# Patient Record
Sex: FEMALE | Race: WHITE | Employment: UNEMPLOYED | URBAN - METROPOLITAN AREA
[De-identification: names, ages, dates, MRNs, and addresses within clinical notes are randomized per-mention and may not be internally consistent; named-entity substitution may affect disease eponyms.]

---

## 2017-01-17 DIAGNOSIS — F43.10 PTSD (POST-TRAUMATIC STRESS DISORDER): ICD-10-CM

## 2017-01-17 NOTE — TELEPHONE ENCOUNTER
Last appt was 8-9-16 with Dr. Emory Boykin  Next appt is None scheduled. Ms. Moore Fitting patient has seen you 2 times for acute visits.

## 2017-02-02 ENCOUNTER — HOSPITAL ENCOUNTER (OUTPATIENT)
Dept: GENERAL RADIOLOGY | Age: 57
Discharge: HOME | End: 2017-02-02
Payer: MEDICARE

## 2017-02-02 ENCOUNTER — OFFICE VISIT (OUTPATIENT)
Dept: CARDIOLOGY CLINIC | Age: 57
End: 2017-02-02

## 2017-02-02 ENCOUNTER — HOSPITAL ENCOUNTER (OUTPATIENT)
Dept: LAB | Age: 57
Discharge: HOME | End: 2017-02-02
Payer: MEDICARE

## 2017-02-02 VITALS
HEIGHT: 72 IN | OXYGEN SATURATION: 97 % | BODY MASS INDEX: 39.68 KG/M2 | WEIGHT: 293 LBS | SYSTOLIC BLOOD PRESSURE: 120 MMHG | HEART RATE: 77 BPM | DIASTOLIC BLOOD PRESSURE: 60 MMHG

## 2017-02-02 DIAGNOSIS — E07.9 THYROID DISEASE: ICD-10-CM

## 2017-02-02 DIAGNOSIS — R05.9 COUGH: ICD-10-CM

## 2017-02-02 DIAGNOSIS — I71.21 ASCENDING AORTIC ANEURYSM: ICD-10-CM

## 2017-02-02 DIAGNOSIS — R01.1 HEART MURMUR: ICD-10-CM

## 2017-02-02 DIAGNOSIS — R00.0 TACHYCARDIA: ICD-10-CM

## 2017-02-02 DIAGNOSIS — E66.3 OVERWEIGHT: ICD-10-CM

## 2017-02-02 DIAGNOSIS — Z01.810 PREOP CARDIOVASCULAR EXAM: Primary | ICD-10-CM

## 2017-02-02 LAB
ANION GAP BLD CALC-SCNC: 10 MMOL/L (ref 3–18)
APTT PPP: 29.5 SEC (ref 23–36.4)
BUN SERPL-MCNC: 16 MG/DL (ref 7–18)
BUN/CREAT SERPL: 17 (ref 12–20)
CALCIUM SERPL-MCNC: 8.7 MG/DL (ref 8.5–10.1)
CHLORIDE SERPL-SCNC: 99 MMOL/L (ref 100–108)
CO2 SERPL-SCNC: 29 MMOL/L (ref 21–32)
CREAT SERPL-MCNC: 0.93 MG/DL (ref 0.6–1.3)
GLUCOSE SERPL-MCNC: 99 MG/DL (ref 74–99)
POTASSIUM SERPL-SCNC: 4.4 MMOL/L (ref 3.5–5.5)
SODIUM SERPL-SCNC: 138 MMOL/L (ref 136–145)

## 2017-02-02 PROCEDURE — 85730 THROMBOPLASTIN TIME PARTIAL: CPT | Performed by: OTOLARYNGOLOGY

## 2017-02-02 PROCEDURE — 80048 BASIC METABOLIC PNL TOTAL CA: CPT | Performed by: OTOLARYNGOLOGY

## 2017-02-02 PROCEDURE — 71020 XR CHEST PA LAT: CPT

## 2017-02-02 PROCEDURE — 36415 COLL VENOUS BLD VENIPUNCTURE: CPT | Performed by: OTOLARYNGOLOGY

## 2017-02-02 RX ORDER — LORAZEPAM 1 MG/1
1 TABLET ORAL
Qty: 30 TAB | Refills: 0 | OUTPATIENT
Start: 2017-02-02
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
## 2017-02-02 NOTE — PROGRESS NOTES
1. Have you been to the ER, urgent care clinic since your last visit? Hospitalized since your last visit? No     2. Have you seen or consulted any other health care providers outside of the [address] [place] [address] since your last visit? Include any pap smears or colon screening.   No

## 2017-02-02 NOTE — LETTER
2/2/2017 8:54 AM 
 
Ms. Ariel Layton [address] [facility] [address] formerly [facility] [phone] Adi Meza was seen in our office on 02/02/2017 for cardiac evaluation. From a cardiac standpoint she is intermediate risk for surgery with Dr. Davian Mccormack. Please feel free to contact our office if you have any questions regarding this patient. Sincerely, Tyra Fay MD

## 2017-02-10 ENCOUNTER — APPOINTMENT (OUTPATIENT)
Dept: GENERAL RADIOLOGY | Age: 57
End: 2017-02-10
Attending: EMERGENCY MEDICINE
Payer: MEDICARE

## 2017-02-10 ENCOUNTER — HOSPITAL ENCOUNTER (EMERGENCY)
Age: 57
Discharge: HOME | End: 2017-02-11
Attending: EMERGENCY MEDICINE
Payer: MEDICARE

## 2017-02-10 VITALS
DIASTOLIC BLOOD PRESSURE: 49 MMHG | OXYGEN SATURATION: 96 % | HEART RATE: 80 BPM | RESPIRATION RATE: 22 BRPM | HEIGHT: 72 IN | SYSTOLIC BLOOD PRESSURE: 140 MMHG | TEMPERATURE: 98.8 F | BODY MASS INDEX: 39.68 KG/M2 | WEIGHT: 293 LBS

## 2017-02-10 DIAGNOSIS — S60.012A CONTUSION OF LEFT THUMB WITHOUT DAMAGE TO NAIL, INITIAL ENCOUNTER: Primary | ICD-10-CM

## 2017-02-10 PROCEDURE — 99283 EMERGENCY DEPT VISIT LOW MDM: CPT

## 2017-02-10 PROCEDURE — 73140 X-RAY EXAM OF FINGER(S): CPT

## 2017-02-10 PROCEDURE — 77030008323 HC SPLNT FNGR GTR DJOR -A

## 2017-02-11 RX ORDER — HYDROCODONE BITARTRATE AND ACETAMINOPHEN 5; 325 MG/1; MG/1
TABLET ORAL
Qty: 12 TAB | Refills: 0 | Status: SHIPPED | OUTPATIENT
Start: 2017-02-11 | End: 2017-02-24

## 2017-02-11 NOTE — ED TRIAGE NOTES
Pt presents to ER c/o left thumb injury ~ 1530 today. Pt states she jammed her thumb and nail pulled back.

## 2017-02-11 NOTE — ED NOTES
Patient c/o left thumb pain after \"her finger nail bent back\". Patient understands plan of care. Will continue to monitor.
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
## 2017-02-13 ENCOUNTER — PATIENT OUTREACH (OUTPATIENT)
Dept: FAMILY MEDICINE CLINIC | Age: 57
End: 2017-02-13

## 2017-02-13 RX ORDER — HYDROCODONE BITARTRATE AND ACETAMINOPHEN 7.5; 325 MG/1; MG/1
1 TABLET ORAL
COMMUNITY
End: 2017-03-19

## 2017-02-13 NOTE — PROGRESS NOTES
Transitional Care: ED Visit        Emergency Department for  [facility] from 2/10/17, for  Thumb Pain. HPI: Reference by Karolee Cranker DO 2/10/17 2352:  Nell Rebollar is a 55 y.o. female with a history of PCOS, folliculitis, hypoglycemia, aortic aneurysm, anemia, HTN, hypercholesterolemia, and breast cancer who presents to the ED c/o left thumb pain, after jamming it into a wall at approximately 3:30 PM today. She states that her nail was removed, but she can still bend her thumb. She denies smoking. There are no other concerns at this time. Called patient on 2/13/2017. Verifying with 2 identifiers. Was at the movies when she hit her hand on the wall, bent her nail, swollen and tender today but improved from the ED. Wearing her splint. Answered her questions about ED note, declined VAL FU at this time since she is improving. Medication reconciliation: New medication(s): Norco    Plan: She will call and schedule her VAL appointment if her symptoms do not improve.

## 2017-02-23 ENCOUNTER — OFFICE VISIT (OUTPATIENT)
Dept: FAMILY MEDICINE CLINIC | Age: 57
End: 2017-02-23

## 2017-02-23 VITALS
OXYGEN SATURATION: 97 % | DIASTOLIC BLOOD PRESSURE: 91 MMHG | TEMPERATURE: 97.8 F | RESPIRATION RATE: 18 BRPM | HEART RATE: 77 BPM | SYSTOLIC BLOOD PRESSURE: 134 MMHG | HEIGHT: 72 IN

## 2017-02-23 DIAGNOSIS — F32.9 REACTIVE DEPRESSION: ICD-10-CM

## 2017-02-23 DIAGNOSIS — F43.10 PTSD (POST-TRAUMATIC STRESS DISORDER): ICD-10-CM

## 2017-02-23 DIAGNOSIS — J40 BRONCHITIS: Primary | ICD-10-CM

## 2017-02-23 DIAGNOSIS — F33.1 MODERATE EPISODE OF RECURRENT MAJOR DEPRESSIVE DISORDER (HCC): ICD-10-CM

## 2017-02-23 DIAGNOSIS — F51.01 PRIMARY INSOMNIA: ICD-10-CM

## 2017-02-23 RX ORDER — LORAZEPAM 1 MG/1
1 TABLET ORAL
Qty: 30 TAB | Refills: 0 | Status: SHIPPED | OUTPATIENT
Start: 2017-02-23

## 2017-02-23 RX ORDER — AZITHROMYCIN 250 MG/1
TABLET, FILM COATED ORAL
Qty: 6 TAB | Refills: 0 | Status: SHIPPED | OUTPATIENT
Start: 2017-02-23 | End: 2017-02-28

## 2017-02-23 RX ORDER — IBUPROFEN 800 MG/1
800 TABLET ORAL
Qty: 30 TAB | Refills: 0 | Status: SHIPPED | OUTPATIENT
Start: 2017-02-23 | End: 2017-03-19

## 2017-02-23 RX ORDER — TRAZODONE HYDROCHLORIDE 150 MG/1
150 TABLET ORAL 3 TIMES DAILY
Qty: 90 TAB | Refills: 0 | Status: SHIPPED | OUTPATIENT
Start: 2017-02-23 | End: 2017-11-22 | Stop reason: SDUPTHER

## 2017-02-23 RX ORDER — CODEINE PHOSPHATE AND GUAIFENESIN 10; 100 MG/5ML; MG/5ML
10 SOLUTION ORAL
Qty: 120 ML | Refills: 0 | Status: SHIPPED | OUTPATIENT
Start: 2017-02-23 | End: 2017-08-30 | Stop reason: ALTCHOICE

## 2017-02-23 NOTE — PATIENT INSTRUCTIONS
Bronchitis: Care Instructions  Your Care Instructions    Bronchitis is inflammation of the bronchial tubes, which carry air to the lungs. The tubes swell and produce mucus, or phlegm. The mucus and inflamed bronchial tubes make you cough. You may have trouble breathing. Most cases of bronchitis are caused by viruses like those that cause colds. Antibiotics usually do not help and they may be harmful. Bronchitis usually develops rapidly and lasts about 2 to 3 weeks in otherwise healthy people. Follow-up care is a key part of your treatment and safety. Be sure to make and go to all appointments, and call your doctor if you are having problems. It's also a good idea to know your test results and keep a list of the medicines you take. How can you care for yourself at home? · Take all medicines exactly as prescribed. Call your doctor if you think you are having a problem with your medicine. · Get some extra rest.  · Take an over-the-counter pain medicine, such as acetaminophen (Tylenol), ibuprofen (Advil, Motrin), or naproxen (Aleve) to reduce fever and relieve body aches. Read and follow all instructions on the label. · Do not take two or more pain medicines at the same time unless the doctor told you to. Many pain medicines have acetaminophen, which is Tylenol. Too much acetaminophen (Tylenol) can be harmful. · Take an over-the-counter cough medicine that contains dextromethorphan to help quiet a dry, hacking cough so that you can sleep. Avoid cough medicines that have more than one active ingredient. Read and follow all instructions on the label. · Breathe moist air from a humidifier, hot shower, or sink filled with hot water. The heat and moisture will thin mucus so you can cough it out. · Do not smoke. Smoking can make bronchitis worse. If you need help quitting, talk to your doctor about stop-smoking programs and medicines. These can increase your chances of quitting for good.   When should you call for help? Call 911 anytime you think you may need emergency care. For example, call if:  · You have severe trouble breathing. Call your doctor now or seek immediate medical care if:  · You have new or worse trouble breathing. · You cough up dark brown or bloody mucus (sputum). · You have a new or higher fever. · You have a new rash. Watch closely for changes in your health, and be sure to contact your doctor if:  · You cough more deeply or more often, especially if you notice more mucus or a change in the color of your mucus. · You are not getting better as expected. Where can you learn more? Go to http://flora-sarkis.info/. Enter H333 in the search box to learn more about \"Bronchitis: Care Instructions. \"  Current as of: May 23, 2016  Content Version: 11.1  © 0162-3644 [employer]. Care instructions adapted under license by [employer] (which disclaims liability or warranty for this information). If you have questions about a medical condition or this instruction, always ask your healthcare professional. [place] [address] any warranty or liability for your use of this information. Insomnia: Care Instructions  Your Care Instructions  Insomnia is the inability to sleep well. It is a common problem for most people at some time. Insomnia may make it hard for you to get to sleep, stay asleep, or sleep as long as you need to. This can make you tired and grouchy during the day. It can also make you forgetful, less effective at work, and unhappy. Insomnia can be caused by conditions such as depression or anxiety. Pain can also affect your ability to sleep. When these problems are solved, the insomnia usually clears up. But sometimes bad sleep habits can cause insomnia. If insomnia is affecting your work or your enjoyment of life, you can take steps to improve your sleep. Follow-up care is a key part of your treatment and safety.  Be sure to make and go to all appointments, and call your doctor if you are having problems. It's also a good idea to know your test results and keep a list of the medicines you take. How can you care for yourself at home? What to avoid  · Do not have drinks with caffeine, such as coffee or black tea, for 8 hours before bed. · Do not smoke or use other types of tobacco near bedtime. Nicotine is a stimulant and can keep you awake. · Avoid drinking alcohol late in the evening, because it can cause you to wake in the middle of the night. · Do not eat a big meal close to bedtime. If you are hungry, eat a light snack. · Do not drink a lot of water close to bedtime, because the need to urinate may wake you up during the night. · Do not read or watch TV in bed. Use the bed only for sleeping and sexual activity. What to try  · Go to bed at the same time every night, and wake up at the same time every morning. Do not take naps during the day. · Keep your bedroom quiet, dark, and cool. · Sleep on a comfortable pillow and mattress. · If watching the clock makes you anxious, turn it facing away from you so you cannot see the time. · If you worry when you lie down, start a worry book. Well before bedtime, write down your worries, and then set the book and your concerns aside. · Try meditation or other relaxation techniques before you go to bed. · If you cannot fall asleep, get up and go to another room until you feel sleepy. Do something relaxing. Repeat your bedtime routine before you go to bed again. · Make your house quiet and calm about an hour before bedtime. Turn down the lights, turn off the TV, log off the computer, and turn down the volume on music. This can help you relax after a busy day. When should you call for help? Watch closely for changes in your health, and be sure to contact your doctor if:  · Your efforts to improve your sleep do not work. · Your insomnia gets worse.   · You have been feeling down, depressed, or hopeless or have lost interest in things that you usually enjoy. Where can you learn more? Go to http://flora-sarkis.info/. Enter P513 in the search box to learn more about \"Insomnia: Care Instructions. \"  Current as of: July 26, 2016  Content Version: 11.1  © 2377-4142 [employer]. Care instructions adapted under license by [employer] (which disclaims liability or warranty for this information). If you have questions about a medical condition or this instruction, always ask your healthcare professional. [address] any warranty or liability for your use of this information.
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
## 2017-02-23 NOTE — PROGRESS NOTES
1. Have you been to the ER, urgent care clinic since your last visit? Hospitalized since your last visit? Yes went to [facility] on 2/10/2017 for left thumb trauma. 2. Have you seen or consulted any other health care providers outside of the [facility] since your last visit? Include any pap smears or colon screening. No.    Patient presents with cough, nasal congestion, headache, wheezing.

## 2017-02-24 ENCOUNTER — TELEPHONE (OUTPATIENT)
Dept: PRIMARY CARE CLINIC | Age: 57
End: 2017-02-24

## 2017-02-24 ENCOUNTER — TELEPHONE (OUTPATIENT)
Dept: FAMILY MEDICINE CLINIC | Age: 57
End: 2017-02-24

## 2017-02-24 RX ORDER — ALBUTEROL SULFATE 90 UG/1
2 AEROSOL, METERED RESPIRATORY (INHALATION)
Qty: 1 INHALER | Refills: 0 | Status: SHIPPED | OUTPATIENT
Start: 2017-02-24 | End: 2017-08-30 | Stop reason: ALTCHOICE

## 2017-02-24 NOTE — TELEPHONE ENCOUNTER
Pt. Wants to know about the inhaler you were suppose to send to [facility] with her other medications. All was there except inhaler. Please call .

## 2017-03-03 ENCOUNTER — HOSPITAL ENCOUNTER (OUTPATIENT)
Dept: PREADMISSION TESTING | Age: 57
Discharge: HOME | End: 2017-03-03
Payer: MEDICARE

## 2017-03-03 ENCOUNTER — ANESTHESIA EVENT (OUTPATIENT)
Dept: SURGERY | Age: 57
End: 2017-03-03
Payer: MEDICARE

## 2017-03-03 LAB
HCT VFR BLD AUTO: 42.4 % (ref 35–45)
HGB BLD-MCNC: 13.8 G/DL (ref 12–16)

## 2017-03-03 PROCEDURE — 85018 HEMOGLOBIN: CPT | Performed by: OTOLARYNGOLOGY

## 2017-03-03 PROCEDURE — 36415 COLL VENOUS BLD VENIPUNCTURE: CPT | Performed by: OTOLARYNGOLOGY

## 2017-03-03 NOTE — PROGRESS NOTES
Patient seen today for airway check. AWC 2 -  right thyroid mass with slight deviation of the trachea. Awake fiberoptic discussed with the patient.

## 2017-03-17 ENCOUNTER — ANESTHESIA (OUTPATIENT)
Dept: SURGERY | Age: 57
End: 2017-03-17
Payer: MEDICARE

## 2017-03-17 ENCOUNTER — HOSPITAL ENCOUNTER (OUTPATIENT)
Age: 57
Setting detail: OBSERVATION
Discharge: HOME | End: 2017-03-19
Attending: OTOLARYNGOLOGY | Admitting: OTOLARYNGOLOGY
Payer: MEDICARE

## 2017-03-17 LAB
CALCIUM SERPL-MCNC: 8.2 MG/DL (ref 8.5–10.1)
GLUCOSE BLD STRIP.AUTO-MCNC: 103 MG/DL (ref 70–110)
GLUCOSE BLD STRIP.AUTO-MCNC: 104 MG/DL (ref 70–110)
HCG SERPL QL: NEGATIVE

## 2017-03-17 PROCEDURE — 74011250636 HC RX REV CODE- 250/636: Performed by: ANESTHESIOLOGY

## 2017-03-17 PROCEDURE — 82310 ASSAY OF CALCIUM: CPT | Performed by: OTOLARYNGOLOGY

## 2017-03-17 PROCEDURE — 77030002986 HC SUT PROL J&J -A: Performed by: OTOLARYNGOLOGY

## 2017-03-17 PROCEDURE — 36415 COLL VENOUS BLD VENIPUNCTURE: CPT | Performed by: OTOLARYNGOLOGY

## 2017-03-17 PROCEDURE — 84703 CHORIONIC GONADOTROPIN ASSAY: CPT | Performed by: OTOLARYNGOLOGY

## 2017-03-17 PROCEDURE — 76060000038 HC ANESTHESIA 3.5 TO 4 HR: Performed by: OTOLARYNGOLOGY

## 2017-03-17 PROCEDURE — 77030008683 HC TU ET CUF COVD -A: Performed by: ANESTHESIOLOGY

## 2017-03-17 PROCEDURE — 74011250636 HC RX REV CODE- 250/636

## 2017-03-17 PROCEDURE — 77030008477 HC STYL SATN SLP COVD -A: Performed by: ANESTHESIOLOGY

## 2017-03-17 PROCEDURE — 74011250637 HC RX REV CODE- 250/637: Performed by: OTOLARYNGOLOGY

## 2017-03-17 PROCEDURE — 77030002916 HC SUT ETHLN J&J -A: Performed by: OTOLARYNGOLOGY

## 2017-03-17 PROCEDURE — 77030010512 HC APPL CLP LIG J&J -C: Performed by: OTOLARYNGOLOGY

## 2017-03-17 PROCEDURE — 74011250636 HC RX REV CODE- 250/636: Performed by: OTOLARYNGOLOGY

## 2017-03-17 PROCEDURE — 74011000250 HC RX REV CODE- 250: Performed by: ANESTHESIOLOGY

## 2017-03-17 PROCEDURE — 88307 TISSUE EXAM BY PATHOLOGIST: CPT | Performed by: OTOLARYNGOLOGY

## 2017-03-17 PROCEDURE — 76210000000 HC OR PH I REC 2 TO 2.5 HR: Performed by: OTOLARYNGOLOGY

## 2017-03-17 PROCEDURE — 77030032490 HC SLV COMPR SCD KNE COVD -B: Performed by: OTOLARYNGOLOGY

## 2017-03-17 PROCEDURE — 77030020782 HC GWN BAIR PAWS FLX 3M -B: Performed by: OTOLARYNGOLOGY

## 2017-03-17 PROCEDURE — 74011258636 HC RX REV CODE- 258/636: Performed by: OTOLARYNGOLOGY

## 2017-03-17 PROCEDURE — 77030011640 HC PAD GRND REM COVD -A: Performed by: OTOLARYNGOLOGY

## 2017-03-17 PROCEDURE — 76010000134 HC OR TIME 3.5 TO 4 HR: Performed by: OTOLARYNGOLOGY

## 2017-03-17 PROCEDURE — 77010033678 HC OXYGEN DAILY

## 2017-03-17 PROCEDURE — 77030002996 HC SUT SLK J&J -A: Performed by: OTOLARYNGOLOGY

## 2017-03-17 PROCEDURE — 77030018836 HC SOL IRR NACL ICUM -A: Performed by: OTOLARYNGOLOGY

## 2017-03-17 PROCEDURE — 77030034115 HC SHR ENDO COAG HARM FOCS J&J -F: Performed by: OTOLARYNGOLOGY

## 2017-03-17 PROCEDURE — 77030031139 HC SUT VCRL2 J&J -A: Performed by: OTOLARYNGOLOGY

## 2017-03-17 PROCEDURE — 77030012407 HC DRN WND BARD -B: Performed by: OTOLARYNGOLOGY

## 2017-03-17 PROCEDURE — 77030008462 HC STPLR SKN PROX J&J -A: Performed by: OTOLARYNGOLOGY

## 2017-03-17 PROCEDURE — 77030006643: Performed by: ANESTHESIOLOGY

## 2017-03-17 PROCEDURE — 74011000250 HC RX REV CODE- 250

## 2017-03-17 PROCEDURE — 77030011267 HC ELECTRD BLD COVD -A: Performed by: OTOLARYNGOLOGY

## 2017-03-17 PROCEDURE — 82962 GLUCOSE BLOOD TEST: CPT

## 2017-03-17 PROCEDURE — 77030013567 HC DRN WND RESERV BARD -A: Performed by: OTOLARYNGOLOGY

## 2017-03-17 PROCEDURE — 99218 HC RM OBSERVATION: CPT

## 2017-03-17 RX ORDER — ONDANSETRON 2 MG/ML
INJECTION INTRAMUSCULAR; INTRAVENOUS AS NEEDED
Status: DISCONTINUED | OUTPATIENT
Start: 2017-03-17 | End: 2017-03-17 | Stop reason: HOSPADM

## 2017-03-17 RX ORDER — DEXAMETHASONE SODIUM PHOSPHATE 4 MG/ML
INJECTION, SOLUTION INTRA-ARTICULAR; INTRALESIONAL; INTRAMUSCULAR; INTRAVENOUS; SOFT TISSUE AS NEEDED
Status: DISCONTINUED | OUTPATIENT
Start: 2017-03-17 | End: 2017-03-17 | Stop reason: HOSPADM

## 2017-03-17 RX ORDER — ALBUTEROL SULFATE 90 UG/1
2 AEROSOL, METERED RESPIRATORY (INHALATION)
Status: DISCONTINUED | OUTPATIENT
Start: 2017-03-17 | End: 2017-03-19 | Stop reason: HOSPADM

## 2017-03-17 RX ORDER — INSULIN LISPRO 100 [IU]/ML
INJECTION, SOLUTION INTRAVENOUS; SUBCUTANEOUS ONCE
Status: DISCONTINUED | OUTPATIENT
Start: 2017-03-17 | End: 2017-03-17 | Stop reason: HOSPADM

## 2017-03-17 RX ORDER — DEXTROSE 50 % IN WATER (D50W) INTRAVENOUS SYRINGE
25-50 AS NEEDED
Status: DISCONTINUED | OUTPATIENT
Start: 2017-03-17 | End: 2017-03-17 | Stop reason: HOSPADM

## 2017-03-17 RX ORDER — EPHEDRINE SULFATE/0.9% NACL/PF 25 MG/5 ML
SYRINGE (ML) INTRAVENOUS AS NEEDED
Status: DISCONTINUED | OUTPATIENT
Start: 2017-03-17 | End: 2017-03-17 | Stop reason: HOSPADM

## 2017-03-17 RX ORDER — MIDAZOLAM HYDROCHLORIDE 1 MG/ML
INJECTION, SOLUTION INTRAMUSCULAR; INTRAVENOUS AS NEEDED
Status: DISCONTINUED | OUTPATIENT
Start: 2017-03-17 | End: 2017-03-17 | Stop reason: HOSPADM

## 2017-03-17 RX ORDER — METOCLOPRAMIDE HYDROCHLORIDE 5 MG/ML
INJECTION INTRAMUSCULAR; INTRAVENOUS AS NEEDED
Status: DISCONTINUED | OUTPATIENT
Start: 2017-03-17 | End: 2017-03-17 | Stop reason: HOSPADM

## 2017-03-17 RX ORDER — LIDOCAINE HYDROCHLORIDE 20 MG/ML
INJECTION, SOLUTION EPIDURAL; INFILTRATION; INTRACAUDAL; PERINEURAL AS NEEDED
Status: DISCONTINUED | OUTPATIENT
Start: 2017-03-17 | End: 2017-03-17 | Stop reason: HOSPADM

## 2017-03-17 RX ORDER — HYDROCODONE BITARTRATE AND ACETAMINOPHEN 7.5; 325 MG/1; MG/1
1 TABLET ORAL
Status: DISCONTINUED | OUTPATIENT
Start: 2017-03-17 | End: 2017-03-19 | Stop reason: HOSPADM

## 2017-03-17 RX ORDER — SODIUM CHLORIDE 0.9 % (FLUSH) 0.9 %
5-10 SYRINGE (ML) INJECTION AS NEEDED
Status: DISCONTINUED | OUTPATIENT
Start: 2017-03-17 | End: 2017-03-17 | Stop reason: HOSPADM

## 2017-03-17 RX ORDER — ROCURONIUM BROMIDE 10 MG/ML
INJECTION, SOLUTION INTRAVENOUS AS NEEDED
Status: DISCONTINUED | OUTPATIENT
Start: 2017-03-17 | End: 2017-03-17 | Stop reason: HOSPADM

## 2017-03-17 RX ORDER — MAGNESIUM SULFATE 100 %
4 CRYSTALS MISCELLANEOUS AS NEEDED
Status: DISCONTINUED | OUTPATIENT
Start: 2017-03-17 | End: 2017-03-17 | Stop reason: HOSPADM

## 2017-03-17 RX ORDER — HYDROMORPHONE HYDROCHLORIDE 2 MG/ML
0.5 INJECTION, SOLUTION INTRAMUSCULAR; INTRAVENOUS; SUBCUTANEOUS
Status: COMPLETED | OUTPATIENT
Start: 2017-03-17 | End: 2017-03-17

## 2017-03-17 RX ORDER — SODIUM CHLORIDE 0.9 % (FLUSH) 0.9 %
5-10 SYRINGE (ML) INJECTION AS NEEDED
Status: DISCONTINUED | OUTPATIENT
Start: 2017-03-17 | End: 2017-03-19

## 2017-03-17 RX ORDER — LIDOCAINE HYDROCHLORIDE 40 MG/ML
SOLUTION TOPICAL AS NEEDED
Status: DISCONTINUED | OUTPATIENT
Start: 2017-03-17 | End: 2017-03-17

## 2017-03-17 RX ORDER — DIPHENHYDRAMINE HYDROCHLORIDE 50 MG/ML
12.5 INJECTION, SOLUTION INTRAMUSCULAR; INTRAVENOUS
Status: DISCONTINUED | OUTPATIENT
Start: 2017-03-17 | End: 2017-03-19 | Stop reason: HOSPADM

## 2017-03-17 RX ORDER — GLYCOPYRROLATE 0.2 MG/ML
INJECTION INTRAMUSCULAR; INTRAVENOUS AS NEEDED
Status: DISCONTINUED | OUTPATIENT
Start: 2017-03-17 | End: 2017-03-17 | Stop reason: HOSPADM

## 2017-03-17 RX ORDER — ACETAMINOPHEN 325 MG/1
650 TABLET ORAL
Status: DISCONTINUED | OUTPATIENT
Start: 2017-03-17 | End: 2017-03-19 | Stop reason: HOSPADM

## 2017-03-17 RX ORDER — SODIUM CHLORIDE 0.9 % (FLUSH) 0.9 %
5-10 SYRINGE (ML) INJECTION EVERY 8 HOURS
Status: DISCONTINUED | OUTPATIENT
Start: 2017-03-17 | End: 2017-03-19

## 2017-03-17 RX ORDER — SODIUM CHLORIDE, SODIUM LACTATE, POTASSIUM CHLORIDE, CALCIUM CHLORIDE 600; 310; 30; 20 MG/100ML; MG/100ML; MG/100ML; MG/100ML
125 INJECTION, SOLUTION INTRAVENOUS CONTINUOUS
Status: DISCONTINUED | OUTPATIENT
Start: 2017-03-17 | End: 2017-03-17 | Stop reason: CLARIF

## 2017-03-17 RX ORDER — KETAMINE HYDROCHLORIDE 10 MG/ML
INJECTION, SOLUTION INTRAMUSCULAR; INTRAVENOUS AS NEEDED
Status: DISCONTINUED | OUTPATIENT
Start: 2017-03-17 | End: 2017-03-17 | Stop reason: HOSPADM

## 2017-03-17 RX ORDER — ONDANSETRON 2 MG/ML
4 INJECTION INTRAMUSCULAR; INTRAVENOUS
Status: DISCONTINUED | OUTPATIENT
Start: 2017-03-17 | End: 2017-03-19

## 2017-03-17 RX ORDER — DEXTROSE, SODIUM CHLORIDE, SODIUM LACTATE, POTASSIUM CHLORIDE, AND CALCIUM CHLORIDE 5; .6; .31; .03; .02 G/100ML; G/100ML; G/100ML; G/100ML; G/100ML
125 INJECTION, SOLUTION INTRAVENOUS CONTINUOUS
Status: DISCONTINUED | OUTPATIENT
Start: 2017-03-17 | End: 2017-03-19

## 2017-03-17 RX ORDER — FENTANYL CITRATE 50 UG/ML
50 INJECTION, SOLUTION INTRAMUSCULAR; INTRAVENOUS
Status: DISCONTINUED | OUTPATIENT
Start: 2017-03-17 | End: 2017-03-17 | Stop reason: HOSPADM

## 2017-03-17 RX ORDER — HYDROMORPHONE HYDROCHLORIDE 1 MG/ML
1 INJECTION, SOLUTION INTRAMUSCULAR; INTRAVENOUS; SUBCUTANEOUS
Status: DISCONTINUED | OUTPATIENT
Start: 2017-03-17 | End: 2017-03-19

## 2017-03-17 RX ORDER — NALOXONE HYDROCHLORIDE 0.4 MG/ML
0.4 INJECTION, SOLUTION INTRAMUSCULAR; INTRAVENOUS; SUBCUTANEOUS AS NEEDED
Status: DISCONTINUED | OUTPATIENT
Start: 2017-03-17 | End: 2017-03-19

## 2017-03-17 RX ORDER — MORPHINE SULFATE 10 MG/ML
INJECTION, SOLUTION INTRAMUSCULAR; INTRAVENOUS AS NEEDED
Status: DISCONTINUED | OUTPATIENT
Start: 2017-03-17 | End: 2017-03-17 | Stop reason: HOSPADM

## 2017-03-17 RX ORDER — SODIUM CHLORIDE, SODIUM LACTATE, POTASSIUM CHLORIDE, CALCIUM CHLORIDE 600; 310; 30; 20 MG/100ML; MG/100ML; MG/100ML; MG/100ML
125 INJECTION, SOLUTION INTRAVENOUS CONTINUOUS
Status: DISCONTINUED | OUTPATIENT
Start: 2017-03-17 | End: 2017-03-17 | Stop reason: HOSPADM

## 2017-03-17 RX ORDER — LORAZEPAM 1 MG/1
1 TABLET ORAL
Status: DISCONTINUED | OUTPATIENT
Start: 2017-03-17 | End: 2017-03-19 | Stop reason: HOSPADM

## 2017-03-17 RX ORDER — PROPOFOL 10 MG/ML
INJECTION, EMULSION INTRAVENOUS AS NEEDED
Status: DISCONTINUED | OUTPATIENT
Start: 2017-03-17 | End: 2017-03-17 | Stop reason: HOSPADM

## 2017-03-17 RX ADMIN — LIDOCAINE HYDROCHLORIDE 5 ML: 40 SOLUTION TOPICAL at 11:45

## 2017-03-17 RX ADMIN — KETAMINE HYDROCHLORIDE 20 MG: 10 INJECTION, SOLUTION INTRAMUSCULAR; INTRAVENOUS at 12:02

## 2017-03-17 RX ADMIN — DEXAMETHASONE SODIUM PHOSPHATE 4 MG: 4 INJECTION, SOLUTION INTRA-ARTICULAR; INTRALESIONAL; INTRAMUSCULAR; INTRAVENOUS; SOFT TISSUE at 15:21

## 2017-03-17 RX ADMIN — MORPHINE SULFATE 1 MG: 10 INJECTION, SOLUTION INTRAMUSCULAR; INTRAVENOUS at 15:13

## 2017-03-17 RX ADMIN — MIDAZOLAM HYDROCHLORIDE 1 MG: 1 INJECTION, SOLUTION INTRAMUSCULAR; INTRAVENOUS at 11:57

## 2017-03-17 RX ADMIN — PROPOFOL 200 MG: 10 INJECTION, EMULSION INTRAVENOUS at 12:05

## 2017-03-17 RX ADMIN — DIPHENHYDRAMINE HYDROCHLORIDE 12.5 MG: 50 INJECTION, SOLUTION INTRAMUSCULAR; INTRAVENOUS at 17:05

## 2017-03-17 RX ADMIN — MORPHINE SULFATE 1 MG: 10 INJECTION, SOLUTION INTRAMUSCULAR; INTRAVENOUS at 13:26

## 2017-03-17 RX ADMIN — MORPHINE SULFATE 5 MG: 10 INJECTION, SOLUTION INTRAMUSCULAR; INTRAVENOUS at 11:54

## 2017-03-17 RX ADMIN — SODIUM CHLORIDE, SODIUM LACTATE, POTASSIUM CHLORIDE, AND CALCIUM CHLORIDE 125 ML/HR: 600; 310; 30; 20 INJECTION, SOLUTION INTRAVENOUS at 11:00

## 2017-03-17 RX ADMIN — METOCLOPRAMIDE HYDROCHLORIDE 10 MG: 5 INJECTION INTRAMUSCULAR; INTRAVENOUS at 11:54

## 2017-03-17 RX ADMIN — HYDROMORPHONE HYDROCHLORIDE 1 MG: 1 INJECTION, SOLUTION INTRAMUSCULAR; INTRAVENOUS; SUBCUTANEOUS at 21:29

## 2017-03-17 RX ADMIN — SODIUM CHLORIDE, SODIUM LACTATE, POTASSIUM CHLORIDE, CALCIUM CHLORIDE, AND DEXTROSE MONOHYDRATE 125 ML/HR: 600; 310; 30; 20; 5 INJECTION, SOLUTION INTRAVENOUS at 21:28

## 2017-03-17 RX ADMIN — SODIUM CHLORIDE, SODIUM LACTATE, POTASSIUM CHLORIDE, AND CALCIUM CHLORIDE: 600; 310; 30; 20 INJECTION, SOLUTION INTRAVENOUS at 14:26

## 2017-03-17 RX ADMIN — MORPHINE SULFATE 3 MG: 10 INJECTION, SOLUTION INTRAMUSCULAR; INTRAVENOUS at 12:55

## 2017-03-17 RX ADMIN — MIDAZOLAM HYDROCHLORIDE 2 MG: 1 INJECTION, SOLUTION INTRAMUSCULAR; INTRAVENOUS at 11:54

## 2017-03-17 RX ADMIN — HYDROMORPHONE HYDROCHLORIDE 0.5 MG: 2 INJECTION INTRAMUSCULAR; INTRAVENOUS; SUBCUTANEOUS at 16:37

## 2017-03-17 RX ADMIN — ONDANSETRON 4 MG: 2 INJECTION INTRAMUSCULAR; INTRAVENOUS at 11:54

## 2017-03-17 RX ADMIN — KETAMINE HYDROCHLORIDE 30 MG: 10 INJECTION, SOLUTION INTRAMUSCULAR; INTRAVENOUS at 11:59

## 2017-03-17 RX ADMIN — GLYCOPYRROLATE 0.2 MG: 0.2 INJECTION INTRAMUSCULAR; INTRAVENOUS at 11:45

## 2017-03-17 RX ADMIN — ONDANSETRON 4 MG: 2 INJECTION INTRAMUSCULAR; INTRAVENOUS at 15:21

## 2017-03-17 RX ADMIN — Medication 10 MG: at 12:27

## 2017-03-17 RX ADMIN — Medication 10 MG: at 12:20

## 2017-03-17 RX ADMIN — FENTANYL CITRATE 50 MCG: 50 INJECTION, SOLUTION INTRAMUSCULAR; INTRAVENOUS at 16:58

## 2017-03-17 RX ADMIN — FENTANYL CITRATE 50 MCG: 50 INJECTION, SOLUTION INTRAMUSCULAR; INTRAVENOUS at 15:56

## 2017-03-17 RX ADMIN — FENTANYL CITRATE 50 MCG: 50 INJECTION, SOLUTION INTRAMUSCULAR; INTRAVENOUS at 17:06

## 2017-03-17 RX ADMIN — FENTANYL CITRATE 50 MCG: 50 INJECTION, SOLUTION INTRAMUSCULAR; INTRAVENOUS at 16:06

## 2017-03-17 RX ADMIN — HYDROMORPHONE HYDROCHLORIDE 0.5 MG: 2 INJECTION INTRAMUSCULAR; INTRAVENOUS; SUBCUTANEOUS at 16:24

## 2017-03-17 RX ADMIN — ACETAMINOPHEN 650 MG: 325 TABLET ORAL at 21:34

## 2017-03-17 RX ADMIN — MIDAZOLAM HYDROCHLORIDE 2 MG: 1 INJECTION, SOLUTION INTRAMUSCULAR; INTRAVENOUS at 11:59

## 2017-03-17 RX ADMIN — ROCURONIUM BROMIDE 50 MG: 10 INJECTION, SOLUTION INTRAVENOUS at 12:06

## 2017-03-17 RX ADMIN — HYDROMORPHONE HYDROCHLORIDE 0.5 MG: 2 INJECTION INTRAMUSCULAR; INTRAVENOUS; SUBCUTANEOUS at 16:30

## 2017-03-17 RX ADMIN — LIDOCAINE HYDROCHLORIDE 60 MG: 20 INJECTION, SOLUTION EPIDURAL; INFILTRATION; INTRACAUDAL; PERINEURAL at 12:05

## 2017-03-17 RX ADMIN — SODIUM CHLORIDE, SODIUM LACTATE, POTASSIUM CHLORIDE, AND CALCIUM CHLORIDE: 600; 310; 30; 20 INJECTION, SOLUTION INTRAVENOUS at 12:25

## 2017-03-17 RX ADMIN — HYDROMORPHONE HYDROCHLORIDE 0.5 MG: 2 INJECTION INTRAMUSCULAR; INTRAVENOUS; SUBCUTANEOUS at 16:18

## 2017-03-17 NOTE — PERIOP NOTES
TRANSFER - OUT REPORT:    Verbal report given to ALEJANDRA Gardner RN(name) on [employer]  being transferred to medical room 322(unit) for routine progression of care       Report consisted of patients Situation, Background, Assessment and   Recommendations(SBAR). Information from the following report(s) SBAR, OR Summary, Procedure Summary, Intake/Output and MAR was reviewed with the receiving nurse. Lines:   Peripheral IV 03/17/17 Right; Inner Forearm (Active)   Site Assessment Clean, dry, & intact 3/17/2017  5:00 PM   Phlebitis Assessment 0 3/17/2017  5:00 PM   Infiltration Assessment 0 3/17/2017  5:00 PM   Dressing Status Clean, dry, & intact 3/17/2017  5:00 PM   Dressing Type Transparent;Tape 3/17/2017  5:00 PM   Hub Color/Line Status Green; Infusing;Patent 3/17/2017  5:00 PM        Opportunity for questions and clarification was provided.       Patient transported with:   O2 @ 2 liters  Registered Nurse  [facility]

## 2017-03-17 NOTE — PERIOP NOTES
TRANSFER - IN REPORT:    Verbal report received from CRNA & ORN on [facility]  being received from OR (unit) for routine post - op      Report consisted of patients Situation, Background, Assessment and   Recommendations(SBAR). Information from the following report(s) SBAR, Intake/Output and MAR was reviewed with the receiving nurse. Opportunity for questions and clarification was provided. Assessment completed upon patients arrival to unit and care assumed.    FEDERICO drain connected to low wall suction per MD request.

## 2017-03-17 NOTE — ANESTHESIA POSTPROCEDURE EVALUATION
Post-Anesthesia Evaluation and Assessment    Cardiovascular Function/Vital Signs  Visit Vitals    /68    Pulse 75    Temp 36.4 °C (97.6 °F)    Resp 14    Ht 6' 1\" (1.854 m)    Wt (!) 193.2 kg (426 lb)    SpO2 96%    BMI 56.2 kg/m2       Patient is status post Procedure(s):  RIGHT THYROID LOBECTOMY. Nausea/Vomiting: Controlled. Postoperative hydration reviewed and adequate. Pain:  Pain Scale 1: FLACC (03/17/17 1730)  Pain Intensity 1: 0 (03/17/17 1730)   Managed. Neurological Status:   Neuro (WDL): Within Defined Limits (03/17/17 1055)   At baseline. Mental Status and Level of Consciousness: Baseline and stable. Pulmonary Status:   O2 Device: Nasal cannula (03/17/17 1640)   Adequate oxygenation and airway patent. Complications related to anesthesia: None    Post-anesthesia assessment completed. No concerns. Patient has met all discharge requirements.     Signed By: Lisa Hussein MD

## 2017-03-17 NOTE — ANESTHESIA PREPROCEDURE EVALUATION
Anesthetic History   No history of anesthetic complications            Review of Systems / Medical History  Patient summary reviewed, nursing notes reviewed and pertinent labs reviewed    Pulmonary  Within defined limits                 Neuro/Psych   Within defined limits           Cardiovascular    Hypertension        Dysrhythmias            GI/Hepatic/Renal           Liver disease     Endo/Other      Hypothyroidism  Morbid obesity and arthritis     Other Findings            Physical Exam    Airway  Mallampati: III  TM Distance: 4 - 6 cm  Neck ROM: normal range of motion, short neck   Mouth opening: Normal     Cardiovascular  Regular rate and rhythm,  S1 and S2 normal,  no murmur, click, rub, or gallop             Dental  No notable dental hx       Pulmonary  Breath sounds clear to auscultation               Abdominal  GI exam deferred       Other Findings            Anesthetic Plan    ASA: 3  Anesthesia type: general          Induction: Intravenous  Anesthetic plan and risks discussed with: Patient

## 2017-03-17 NOTE — PERIOP NOTES
Family has been updated and given inpatient room #322. Receiving unit notified that SBAR is available for review.

## 2017-03-17 NOTE — PERIOP NOTES
Patient transferred to room 322,medical floor via bed. NC at 2LPM. PIV with IVF infusing per orders. Blood pressure 138/57, pulse 78, temperature 97.8 °F (36.6 °C), resp. rate 16, height 6' 1\" (1.854 m), weight (!) 193.2 kg (426 lb), last menstrual period 03/15/2017, SpO2 98 %. Patient awake, drifts off to sleep. Isabella farooq at bedside. Blaise RN at bedside to accept patient.

## 2017-03-17 NOTE — IP AVS SNAPSHOT
Christophe Clonts 
 
 
 [address] [facility] [address] 
[phone] Patient: Alexa Gilmore MRN: [id number] :1960 You are allergic to the following Allergen Reactions Oxycodone Anaphylaxis Hives Adhesive Tape-Silicones Rash Paper tape is okay to use. Celebrex (Celecoxib) Hives Contrast Dye (Iodine) Nausea Only Abdominal pain, dizziness Cortisone Nausea and Vomiting Flexeril (Cyclobenzaprine) Nausea and Vomiting Pt states also causes confusion Keflex (Cephalexin) Diarrhea Nausea Only Joint pain Recent Documentation Height Weight BMI OB Status Smoking Status 1.854 m (!) 193.2 kg 56.2 kg/m2 Having regular periods Never Smoker Emergency Contacts Name Discharge Info Relation Home Work Mobile [address] CAREGIVER [3] Friend [5] [phone] [facility] HOSPITAL DISCHARGE CAREGIVER [3] Friend [5] [phone] [address] CAREGIVER [3] Brother [24] [phone] About your hospitalization You were admitted on:  2017 You last received care in the:  [address] [place] [address] You were discharged on:  2017 Unit phone number:  [phone] Why you were hospitalized Your primary diagnosis was:  Not on File Providers Seen During Your Hospitalizations Provider Role Specialty Primary office phone Herson Pedroza MD Attending Provider Otolaryngology [phone] Your Primary Care Physician (PCP) Primary Care Physician Office Phone Office Fax Richy Chambers [phone] Follow-up Information Follow up With Details Comments Contact Info Jerone Homans, NP   [address] [place] [address] 
[phone]  Herson Pedroza MD  Patient to make appointment Monday for follow up on Friday [address] [facility] 230 [address] [facility] 
[phone] Your Appointments Tuesday March 28, 2017  1:00 PM EDT Any with Lalita Ge MD  
CARDIOVASCULAR AND THORACIC SPEC (ALBA SCHEDULING) [facility] [place] [address] [facility] [address]  
[phone] Current Discharge Medication List  
  
START taking these medications Dose & Instructions Dispensing Information Comments Morning Noon Evening Bedtime  
 ciprofloxacin HCl 500 mg tablet Commonly known as:  CIPRO Your last dose was: Your next dose is:    
   
   
 Dose:  500 mg Take 1 Tab by mouth every twelve (12) hours. Quantity:  14 Tab Refills:  0 CONTINUE these medications which have CHANGED Dose & Instructions Dispensing Information Comments Morning Noon Evening Bedtime  
 clotrimazole-betamethasone topical cream  
Commonly known as:  Letyldpaoc Dudley What changed:   
- when to take this 
- reasons to take this Your last dose was: Your next dose is:    
   
   
 Apply  to affected area two (2) times a day. Quantity:  45 g Refills:  2 Please dispense 2 tubes at a time with 2 refills HYDROcodone-acetaminophen 7.5-325 mg per tablet Commonly known as:  Rolinda Doles What changed:  reasons to take this Your last dose was: Your next dose is:    
   
   
 Dose:  1 Tab Take 1 Tab by mouth every six (6) hours as needed. Max Daily Amount: 4 Tabs. Quantity:  30 Tab Refills:  0 LORazepam 1 mg tablet Commonly known as:  ATIVAN What changed:  when to take this Your last dose was: Your next dose is:    
   
   
 Dose:  1 mg Take 1 Tab by mouth daily as needed for Anxiety (Severe). Quantity:  30 Tab Refills:  0  
     
   
   
   
  
 tretinoin 0.05 % topical cream  
Commonly known as:  RETIN-A What changed: - when to take this 
- additional instructions Your last dose was: Your next dose is:    
   
   
 Apply to affected area at night time. Quantity:  45 g Refills:  0 CONTINUE these medications which have NOT CHANGED Dose & Instructions Dispensing Information Comments Morning Noon Evening Bedtime  
 albuterol 90 mcg/actuation inhaler Commonly known as:  VENTOLIN HFA Your last dose was: Your next dose is:    
   
   
 Dose:  2 Puff Take 2 Puffs by inhalation every four (4) hours as needed for Wheezing. Quantity:  1 Inhaler Refills:  0 Cholecalciferol (Vitamin D3) 50,000 unit Cap Your last dose was: Your next dose is: Take  by mouth Every Mon, Wed & Sun.  
 Refills:  0  
     
   
   
   
  
 cinnamon bark (bulk) Powd Your last dose was: Your next dose is:    
   
   
 Dose:  40 mg Take 40 mg by mouth daily. Refills:  0  
     
   
   
   
  
 COQ10  PO Your last dose was: Your next dose is:    
   
   
 Dose:  1 Tab Take 1 Tab by mouth daily. Refills:  0 EPINEPHrine 0.3 mg/0.3 mL injection Commonly known as:  Armani Katherine Your last dose was: Your next dose is:    
   
   
 Dose:  0.3 mg  
0.3 mL by IntraMUSCular route once as needed for Anaphylaxis for up to 1 dose. Ok to dispense #2 if box contains 2 pens. Quantity:  1 mL Refills:  0  
     
   
   
   
  
 FISH OIL 1,000 mg Cap Generic drug:  omega-3 fatty acids-vitamin e Your last dose was: Your next dose is:    
   
   
 Dose:  1 Cap Take 1 Cap by mouth daily. Refills:  0  
     
   
   
   
  
 flaxseed oil 1,000 mg Cap Your last dose was: Your next dose is:    
   
   
 Dose:  1000 mg Take 1,000 mg by mouth daily. Refills:  0  
     
   
   
   
  
 guaiFENesin-codeine 100-10 mg/5 mL solution Commonly known as:  ROBITUSSIN AC Your last dose was: Your next dose is:    
   
   
 Dose:  10 mL Take 10 mL by mouth four (4) times daily as needed for Cough. Max Daily Amount: 40 mL. Quantity:  120 mL Refills:  0 MAGNESIUM PO Your last dose was: Your next dose is:    
   
   
 Dose:  400 mg Take 400 mg by mouth daily. Refills:  0  
     
   
   
   
  
 OTHER Your last dose was: Your next dose is:    
   
   
 Ground seaweed - 1 tsp. Daily. Refills:  0  
     
   
   
   
  
 traZODone 150 mg tablet Commonly known as:  Jaz Neri Your last dose was: Your next dose is:    
   
   
 Dose:  150 mg Take 1 Tab by mouth three (3) times daily. Quantity:  90 Tab Refills:  0  
     
   
   
   
  
 TURMERIC (BULK) Your last dose was: Your next dose is:    
   
   
 Dose:  40 mg Take 40 mg by mouth daily. Refills:  0  
     
   
   
   
  
 VITAMIN B COMPLEX PO Your last dose was: Your next dose is:    
   
   
 Dose:  1 Cap Take 1 Cap by mouth daily. Refills:  0  
     
   
   
   
  
 VITAMIN C 250 mg tablet Generic drug:  ascorbic acid (vitamin C) Your last dose was: Your next dose is:    
   
   
 Dose:  250 mg Take 250 mg by mouth daily. Refills:  0  
     
   
   
   
  
 vitamin e 200 unit capsule Commonly known as:  [address] Your last dose was: Your next dose is:    
   
   
 Dose:  200 Units Take 200 Units by mouth daily. Refills:  0 STOP taking these medications   
 ibuprofen 800 mg tablet Commonly known as:  MOTRIN Where to Get Your Medications Information on where to get these meds will be given to you by the nurse or doctor. ! Ask your nurse or doctor about these medications  
  ciprofloxacin HCl 500 mg tablet HYDROcodone-acetaminophen 7.5-325 mg per tablet Discharge Instructions Thyroid Surgery: What to Expect at [facility] Your Recovery Your doctor made a cut (incision) in your neck and removed part of your thyroid gland to find what is causing a lump or to remove a growth in the gland. The piece removed may have been large or small. Your doctor may have removed all of your thyroid if there was cancer or another problem. He or she did a test on a small sample of the tissue from your thyroid and closed the incision in your neck with stitches. Keep the incision covered with the bandage and dry for 48 hours. A small amount of bleeding can be expected. Ask your doctor how much drainage to expect. You may go home on the same day or stay one or more nights in the hospital after surgery. You may be able to return to work or your normal routine in 1 to 2 weeks. This depends on whether you need more treatment, how you feel, and the kind of work you do. Your doctor will check your incision about a week after surgery. You may need to take thyroid medicine. If you have thyroid cancer, you may need to have radioactive iodine therapy. Your doctor will talk to you about what happens next. You will feel some pain for several days. You may have some nausea and general muscle aches and may feel tired for 1 to 2 days. You also may have a sore throat and sound hoarse. This care sheet gives you a general idea about how long it will take for you to recover. But each person recovers at a different pace. Follow the steps below to feel better as quickly as possible. How can you care for yourself at home? Activity · Rest when you feel tired. Getting enough sleep will help you recover. · Most people are able to return to work a few days after surgery, but this can depend on what type of work you do. Diet · You can eat your normal diet. If your stomach is upset, try bland, low-fat foods like plain rice, broiled chicken, toast, and yogurt. Medicines · Your doctor will tell you if and when you can restart your medicines. He or she will also give you instructions about taking any new medicines. · If you take blood thinners, such as warfarin (Coumadin), clopidogrel (Plavix), or aspirin, be sure to talk to your doctor. He or she will tell you if and when to start taking those medicines again. Make sure that you understand exactly what your doctor wants you to do. · Suck on throat lozenges or gargle with warm salt water to help your sore throat. · Be safe with medicines. Take pain medicines exactly as directed. ¨ If the doctor gave you a prescription medicine for pain, take it as prescribed. ¨ If you are not taking a prescription pain medicine, ask your doctor if you can take an over-the-counter medicine. · If you think your pain medicine is making you sick to your stomach: 
¨ Take your medicine after meals (unless your doctor has told you not to). ¨ Ask your doctor for a different pain medicine. Incision care · If you have strips of tape on the cut (incision) the doctor made, leave the tape on for a week or until it falls off. Or follow your doctor's instructions for removing the tape. · Keep the area clean and dry. · You will have a dressing over the cut (incision). A dressing helps the incision heal and protects it. Your doctor will tell you how to take care of this. Exercise · Avoid strenuous activities, such as bicycle riding, jogging, weight lifting, or aerobic exercise, until your doctor says it is okay. Elevation · You may be more comfortable if you keep your head up on a pillow when you lie down. Support the back of your head and neck with both hands when you sit up to prevent discomfort. Follow-up care is a key part of your treatment and safety. Be sure to make and go to all appointments, and call your doctor if you are having problems. It's also a good idea to know your test results and keep a list of the medicines you take. When should you call for help? Call 911 anytime you think you may need emergency care. For example, call if: 
· You have severe trouble breathing. Call your doctor now or seek immediate medical care if: 
· You have a lot of bleeding through the bandage. · You have a hard time swallowing. · You have trouble breathing. · You have new or worsening pain. · You have symptoms of infection, such as: 
¨ Increased pain, swelling, warmth, or redness. ¨ Red streaks leading from the incision. ¨ Pus draining from the incision. ¨ A fever. Watch closely for any changes in your health, and be sure to contact your doctor if: 
· You're not getting better as expected. · You notice a change in your voice. Where can you learn more? Go to http://flora-sarkis.info/. Enter V203 in the search box to learn more about \"Thyroid Surgery: What to Expect at Home. \" Current as of: July 28, 2016 Content Version: 11.1 © 8658-6999 Startupeando. Care instructions adapted under license by [employer] (which disclaims liability or warranty for this information). If you have questions about a medical condition or this instruction, always ask your healthcare professional. [place] [address] any warranty or liability for your use of this information. Patient armband removed and shredded DISCHARGE SUMMARY from Nurse The following personal items are in your possession at time of discharge: 
 
Dental Appliances: None Visual Aid: Glasses Home Medications: Sent home (Given to BODØ ) Jewelry: None Clothing: Pants, Footwear, Shirt, Sent home, Socks (Given to BODØ ) Other Valuables: Cell Phone, Ana Laura Comfort, Walker (given to BODØ ) PATIENT INSTRUCTIONS: 
 
 
F-face looks uneven A-arms unable to move or move unevenly S-speech slurred or non-existent T-time-call 911 as soon as signs and symptoms begin-DO NOT go Back to bed or wait to see if you get better-TIME IS BRAIN. Warning Signs of HEART ATTACK Call 911 if you have these symptoms: 
? Chest discomfort. Most heart attacks involve discomfort in the center of the chest that lasts more than a few minutes, or that goes away and comes back. It can feel like uncomfortable pressure, squeezing, fullness, or pain. ? Discomfort in other areas of the upper body. Symptoms can include pain or discomfort in one or both arms, the back, neck, jaw, or stomach. ? Shortness of breath with or without chest discomfort. ? Other signs may include breaking out in a cold sweat, nausea, or lightheadedness. Don't wait more than five minutes to call [address]! Fast action can save your life. Calling 911 is almost always the fastest way to get lifesaving treatment. Emergency Medical Services staff can begin treatment when they arrive  up to an hour sooner than if someone gets to the hospital by car. The discharge information has been reviewed with the patient. The patient verbalized understanding. Discharge medications reviewed with the patient and appropriate educational materials and side effects teaching were provided. [employer] Activation Thank you for requesting access to [employer]. Please follow the instructions below to securely access and download your online medical record. [employer] allows you to send messages to your doctor, view your test results, renew your prescriptions, schedule appointments, and more. How Do I Sign Up? 1. In your internet browser, go to [email or web address] 
2. Click on the First Time User? Click Here link in the Sign In box. You will be redirect to the New Member Sign Up page. 3. Enter your Spring[employer] Access Code exactly as it appears below. You will not need to use this code after youve completed the sign-up process. If you do not sign up before the expiration date, you must request a new code. Amel[employer]t Access Code: Activation code not generated Current [employer] Status: Active (This is the date your [employer] access code will ) 4. Enter the last four digits of your Social Security Number (xxxx) and Date of Birth (mm/dd/yyyy) as indicated and click Submit. You will be taken to the next sign-up page. 5. Create a [employer]t ID. This will be your [employer] login ID and cannot be changed, so think of one that is secure and easy to remember. 6. Create a [employer] password. You can change your password at any time. 7. Enter your Password Reset Question and Answer. This can be used at a later time if you forget your password. 8. Enter your e-mail address. You will receive e-mail notification when new information is available in [address] 9. Click Sign Up. You can now view and download portions of your medical record. 10. Click the Download Summary menu link to download a portable copy of your medical information. Additional Information If you have questions, please visit the Frequently Asked Questions section of the [employer] website at [email or web address] Remember, [employer] is NOT to be used for urgent needs. For medical emergencies, dial 911. Discharge Orders None ACO Transitions of Care Introducing [employer] [address] offers a voluntary care coordination program to provide high quality service and care to [facility] fee-for-service beneficiaries. Carlos Aj was designed to help you enhance your health and well-being through the following services: ? Transitions of Care  support for individuals who are transitioning from one care setting to another (example: Hospital to home). ? Chronic and Complex Care Coordination  support for individuals and caregivers of those with serious or chronic illnesses or with more than one chronic (ongoing) condition and those who take a number of different medications. If you meet specific medical criteria, a [address] [place] [address] Rd may call you directly to coordinate your care with your primary care physician and your other care providers. For questions about the [facility] programs, please, contact your physicians office. For general questions or additional information about Accountable Care Organizations: 
Please visit www.medicare.gov/acos. html or call 1-800-MEDICARE (5-313.479.7816) TTY users should call 1-320.785.3526. [employer] Announcement We are excited to announce that we are making your provider's discharge notes available to you in [employer]. You will see these notes when they are completed and signed by the physician that discharged you from your recent hospital stay. If you have any questions or concerns about any information you see in [employer], please call the Health Information Department where you were seen or reach out to your Primary Care Provider for more information about your plan of care. Introducing [facility]! Dear Angelita Scott: Thank you for requesting a [employer] account. Our records indicate that you already have an active [employer] account. You can access your account anytime at [email or web address] Did you know that you can access your hospital and ER discharge instructions at any time in [employer]? You can also review all of your test results from your hospital stay or ER visit. Additional Information If you have questions, please visit the Frequently Asked Questions section of the [employer] website at [email or web address] Remember, MyChart is NOT to be used for urgent needs. For medical emergencies, dial 911. Now available from your iPhone and Android! General Information Please provide this summary of care documentation to your next provider. Patient Signature:  ____________________________________________________________ Date:  ____________________________________________________________  
  
Gomez Mais Provider Signature:  ____________________________________________________________ Date:  ____________________________________________________________

## 2017-03-17 NOTE — PERIOP NOTES
Notified Sharonda Sin BP's have been closely correlating to preop BP of 135/74, instead of induction BP of 104/65. Patient is stable otherwise. He was ok with that.

## 2017-03-17 NOTE — H&P
[address] [place] [address]  PRE-OP HISTORY AND PHYSICAL    Name:  Wesley Batista  MR#:  [id number]  :  1960  Account #:  [de-identified]  Date of Adm:  2017      PREOPERATIVE DIAGNOSIS: Thyromegaly. HISTORY OF PRESENT ILLNESS: The patient is a 71-year-old  female who has had significant thyromegaly. The patient stated it has  been ongoing for a lengthy period of time. She has been followed by  Dr. Sissy Zaragoza in [place]. A fine-needle aspiration was performed on  several occasions which revealed no evidence of any obvious  carcinoma. The patient does have some significant compressive type  symptomatology. She is having intermittent choking as well as some  significant pressure, especially noted when lying down. The patient has  had serial ultrasounds by Dr. Sissy Zaragoza for this. There has been some  enlargement. She denies any significant weight loss, mild dysphagia is  seen. The patient had been seen by Dr. Judson Valentine in the past, who  recommended a thyroidectomy. The patient, however, elected to hold  off with this. The patient is noted to have some significant thyromegaly. The right  lobe measures 6.5 cm in greatest size, the left measuring 5.7 on  ultrasound. CT scan, however, revealed the right lobe to be 8.4 cm in  size, extending to the sternal notch without any substernal extension. Trachea was deviated to the left side. No obvious abnormality was  seen. The patient has also had a history of aortic aneurysm in the ascending  aorta. She does have a past medical history significant for morbid  obesity. The patient is disabled secondary to PTSD. The patient now to  undergo right-sided thyroid lobectomy with frozen section, possible  total thyroidectomy if carcinoma is seen. We had discussed the  situation with the patient regarding a total thyroidectomy. She would  like to hold off with this unless carcinoma noted. ALLERGIES  1. OXYCODONE. 2. TAPE. MEDICATIONS  1. Ergocalciferol. 2. Ibuprofen. 3. Lorazepam.  4. Trazodone. SOCIAL HISTORY: The patient admits to 1 glass of wine per month. She denies any tobacco or alcohol use. PAST MEDICAL HISTORY: Significant for morbid obesity with a  disability secondary to PTSD. The patient has also had osteoarthritis,  thoracic aneurysm. PHYSICAL EXAMINATION  GENERAL: Reveals a well-developed, well-nourished female with a  height of 6 feet 1 inch, weight of 420, BMI of 55. HEENT: Ear exam reveals normal-appearing tympanic membranes. The oral cavity and oropharynx are within normal limits. The intranasal  exam reveals no evidence of any mucopurulent discharge. NECK: Reveals some significant thyromegaly seen. The patient's  trachea is deviated to the left side. Flexible fiberoptic nasal  laryngoscopy was performed. The patient has no evidence of any  abnormality. There is some deviation of the larynx to the left side  without any other significant findings. CHEST: Bilaterally clear. HEART: S1, S2. No murmur audible. EXTREMITIES: Within normal limits. No other significant findings are  seen. IMPRESSION: Significant thyromegaly. PLAN: The patient with a significant left-sided thyroid gland to be over  8 cm in size. The patient with some compressive symptomatology. The  patient to undergo right thyroid lobectomy. Consideration was given for  a total thyroidectomy; however, the patient adamantly refuses unless a  carcinoma is noted. The patient wishing to avoid the use of thyroid  hormone in the future. Risks, benefits, complications were discussed with the patient. We will  be proceeding with surgery on 03/17/2017. The patient will contact me  if any difficulties arise.         Liv Mccloud MD    PM / [facility]  D:  03/16/2017   19:09  T:  03/16/2017   19:39  Job #:  [id number]

## 2017-03-17 NOTE — PERIOP NOTES
Patient still having pain 8/10 fentanyl not helping spoke to Dr. Keshav Escobar who is the covering anesthesiologist and he said we can use dilaudid the patient said she had it before and it helped her.

## 2017-03-17 NOTE — BRIEF OP NOTE
BRIEF OPERATIVE NOTE    Date of Procedure: 3/17/2017   Preoperative Diagnosis: THYROID NODULE  Postoperative Diagnosis: THYROID NODULE    Procedure(s):  RIGHT THYROID LOBECTOMY  Surgeon(s) and Role:     * González Babb MD - Primary            Surgical Staff:  Circ-1: Eli James RN  Circ-Relief: Jonatan Piper RN  Scrub Tech-1: Landon Grimm  Scrub Tech-2: Paulina Zimmerman  Scrub Tech-Relief: Kiran Valderramaub RN-1: Oswaldo Gaffney RN  Surg Asst-1: Eber Randle  Event Time In   Incision Start 1233   Incision Close 1522     Anesthesia: General   Estimated Blood Loss: 50ml  Specimens:   ID Type Source Tests Collected by Time Destination   1 : RIGHT THYROID LOBE Preservative Thyroid  González Babb MD 3/17/2017 1449 Pathology      Findings: massive rt thyroid lobe   Complications: none  Implants: * No implants in log *

## 2017-03-18 PROCEDURE — 74011250637 HC RX REV CODE- 250/637: Performed by: OTOLARYNGOLOGY

## 2017-03-18 PROCEDURE — 74011258636 HC RX REV CODE- 258/636: Performed by: OTOLARYNGOLOGY

## 2017-03-18 PROCEDURE — 74011250636 HC RX REV CODE- 250/636: Performed by: OTOLARYNGOLOGY

## 2017-03-18 PROCEDURE — 77010033678 HC OXYGEN DAILY

## 2017-03-18 PROCEDURE — 99218 HC RM OBSERVATION: CPT

## 2017-03-18 RX ORDER — BACITRACIN 500 [USP'U]/G
500 OINTMENT TOPICAL 2 TIMES DAILY
Status: DISCONTINUED | OUTPATIENT
Start: 2017-03-19 | End: 2017-03-19 | Stop reason: HOSPADM

## 2017-03-18 RX ADMIN — HYDROMORPHONE HYDROCHLORIDE 1 MG: 1 INJECTION, SOLUTION INTRAMUSCULAR; INTRAVENOUS; SUBCUTANEOUS at 08:33

## 2017-03-18 RX ADMIN — ACETAMINOPHEN 650 MG: 325 TABLET ORAL at 15:16

## 2017-03-18 RX ADMIN — TRAZODONE HYDROCHLORIDE 150 MG: 100 TABLET ORAL at 08:33

## 2017-03-18 RX ADMIN — HYDROMORPHONE HYDROCHLORIDE 1 MG: 1 INJECTION, SOLUTION INTRAMUSCULAR; INTRAVENOUS; SUBCUTANEOUS at 04:03

## 2017-03-18 RX ADMIN — HYDROMORPHONE HYDROCHLORIDE 1 MG: 1 INJECTION, SOLUTION INTRAMUSCULAR; INTRAVENOUS; SUBCUTANEOUS at 15:16

## 2017-03-18 RX ADMIN — TRAZODONE HYDROCHLORIDE 150 MG: 100 TABLET ORAL at 18:50

## 2017-03-18 RX ADMIN — ACETAMINOPHEN 650 MG: 325 TABLET ORAL at 11:20

## 2017-03-18 RX ADMIN — SODIUM CHLORIDE, SODIUM LACTATE, POTASSIUM CHLORIDE, CALCIUM CHLORIDE, AND DEXTROSE MONOHYDRATE 125 ML/HR: 600; 310; 30; 20; 5 INJECTION, SOLUTION INTRAVENOUS at 04:03

## 2017-03-18 RX ADMIN — SODIUM CHLORIDE, SODIUM LACTATE, POTASSIUM CHLORIDE, CALCIUM CHLORIDE, AND DEXTROSE MONOHYDRATE 125 ML/HR: 600; 310; 30; 20; 5 INJECTION, SOLUTION INTRAVENOUS at 15:04

## 2017-03-18 RX ADMIN — HYDROMORPHONE HYDROCHLORIDE 1 MG: 1 INJECTION, SOLUTION INTRAMUSCULAR; INTRAVENOUS; SUBCUTANEOUS at 19:00

## 2017-03-18 RX ADMIN — TRAZODONE HYDROCHLORIDE 150 MG: 100 TABLET ORAL at 00:44

## 2017-03-18 RX ADMIN — ACETAMINOPHEN 650 MG: 325 TABLET ORAL at 19:00

## 2017-03-18 RX ADMIN — DIPHENHYDRAMINE HYDROCHLORIDE 12.5 MG: 50 INJECTION, SOLUTION INTRAMUSCULAR; INTRAVENOUS at 11:20

## 2017-03-18 RX ADMIN — Medication 10 ML: at 19:00

## 2017-03-18 RX ADMIN — ACETAMINOPHEN 650 MG: 325 TABLET ORAL at 01:43

## 2017-03-18 RX ADMIN — LORAZEPAM 1 MG: 1 TABLET ORAL at 01:37

## 2017-03-18 NOTE — PROGRESS NOTES
met with patient, completed the initial Spiritual Assessment of the patient, and offered Pastoral Care, see flow sheets for interventions. Patient said she had surgery and is doing all right. She is a member of the [employer]. She asked for prayer. Pastoral support provided. Patient does not have any Anglican/cultural needs that will affect patients preferences in health care. Chart reviewed. Chaplains will continue to follow and will provide pastoral care on an as needed/as requested basis. William Saunders MDiv.   Board Certified [employer] [phone]

## 2017-03-18 NOTE — PROGRESS NOTES
2110 - Physician in to round on patient. FEDERICO suction to wall reviewed by physician. Shift summary: Shift uneventful, patient did not rest much due to throat discomfort. FEDERICO output around 50 ml for shift. Dilaudid 1 mg given per patient request for pain.

## 2017-03-18 NOTE — ROUTINE PROCESS
Bedside and Verbal shift change report given to NIMA Handy RN (oncoming nurse) by ALEJANDRA Mckeon RN (offgoing nurse). Report included the following information SBAR, Kardex, OR Summary, Procedure Summary, Intake/Output, MAR, Recent Results and Med Rec Status.

## 2017-03-18 NOTE — PROGRESS NOTES
POD#1  vss afeb   c/o nausea--not able to yoselin po   having significant headaches   FEDERICO inadvertently broken at bulb site--replaced without difficultly  Pt stable but will hold discharge til am to allow for increased po and nausea to resolve   pt agreeable  Wilberto Santiago

## 2017-03-18 NOTE — ROUTINE PROCESS
Bedside and Verbal shift change report given to Jessica Bergeron RN (oncoming nurse) by Donovan Justice RN (offgoing nurse). Report included the following information SBAR, Kardex, ED Summary, Procedure Summary, Intake/Output, MAR, Recent Results and Med Rec Status.

## 2017-03-18 NOTE — PROGRESS NOTES
Chart Reviewed. Noted patient admitted to the hospital for further medical management. Care Management to follow up with patient and/or family for transition of care needs prn.  notified regarding observation.

## 2017-03-18 NOTE — PROGRESS NOTES
Oral and Written notification given to patient and/or caregiver informing them that they are currently an Outpatient receiving care in our facility. Outpatient services include Observation Services under [place] and [place] requirements.  notified

## 2017-03-18 NOTE — PROGRESS NOTES
TRANSFER - IN REPORT:    Verbal report received from Jenny Rivera RN(name) on [facility]  being received from PACU(unit) for routine post - op      Report consisted of patients Situation, Background, Assessment and   Recommendations(SBAR). Information from the following report(s) SBAR, Kardex, OR Summary, Procedure Summary, Intake/Output, MAR, Accordion, Recent Results and Med Rec Status was reviewed with the receiving nurse. Opportunity for questions and clarification was provided. Assessment completed upon patients arrival to unit and care assumed.

## 2017-03-18 NOTE — PROGRESS NOTES
Pt Federico Woody, 56 yo female. POD 1   Pt in bed during bedside report. Pt reported pain over night, controlled with medication. Pt ambulated to bathroom with 1 person assist using walker. Pt FEDERICO drain connected to walk suction. While attempting to disconnect pt from wall suction the FEDERICO drain broke on pt connection side. Dr. Rachel Reyes notified, he stated he will be in to assess pt within an hour. Dr. Rachel Reyes replaced FEDERICO drain bulb. Orders to maintain wall suction until 1900 today, then maintain bulb suction. 1630 Pt ambulate to bathroom with assistance and use of walker. Pt ambulate in [place] with assistance and walker. Pt returned to room. PT offered SPA bath. Pt states she would like a SPA bath. Clarissa Edward CNA notified, she will give pt a SPA bath today. Pt removed from wall suction, maintaining bulb suction. PT in bed during bedside report.

## 2017-03-19 VITALS
BODY MASS INDEX: 39.68 KG/M2 | WEIGHT: 293 LBS | DIASTOLIC BLOOD PRESSURE: 69 MMHG | HEART RATE: 64 BPM | SYSTOLIC BLOOD PRESSURE: 126 MMHG | RESPIRATION RATE: 16 BRPM | HEIGHT: 72 IN | OXYGEN SATURATION: 97 % | TEMPERATURE: 97.6 F

## 2017-03-19 PROCEDURE — 74011258636 HC RX REV CODE- 258/636: Performed by: OTOLARYNGOLOGY

## 2017-03-19 PROCEDURE — 74011250637 HC RX REV CODE- 250/637: Performed by: OTOLARYNGOLOGY

## 2017-03-19 PROCEDURE — 74011250636 HC RX REV CODE- 250/636: Performed by: OTOLARYNGOLOGY

## 2017-03-19 PROCEDURE — 99218 HC RM OBSERVATION: CPT

## 2017-03-19 RX ORDER — CIPROFLOXACIN 500 MG/1
500 TABLET ORAL EVERY 12 HOURS
Qty: 14 TAB | Refills: 0 | Status: SHIPPED | OUTPATIENT
Start: 2017-03-19 | End: 2017-08-30 | Stop reason: ALTCHOICE

## 2017-03-19 RX ORDER — FACIAL-BODY WIPES
10 EACH TOPICAL DAILY PRN
Status: DISCONTINUED | OUTPATIENT
Start: 2017-03-19 | End: 2017-03-19 | Stop reason: HOSPADM

## 2017-03-19 RX ORDER — HYDROCODONE BITARTRATE AND ACETAMINOPHEN 7.5; 325 MG/1; MG/1
1 TABLET ORAL
Qty: 30 TAB | Refills: 0 | Status: SHIPPED | OUTPATIENT
Start: 2017-03-19 | End: 2018-08-29

## 2017-03-19 RX ADMIN — SODIUM CHLORIDE, SODIUM LACTATE, POTASSIUM CHLORIDE, CALCIUM CHLORIDE, AND DEXTROSE MONOHYDRATE 125 ML/HR: 600; 310; 30; 20; 5 INJECTION, SOLUTION INTRAVENOUS at 00:24

## 2017-03-19 RX ADMIN — Medication 10 ML: at 00:25

## 2017-03-19 RX ADMIN — HYDROCODONE BITARTRATE AND ACETAMINOPHEN 1 TABLET: 7.5; 325 TABLET ORAL at 11:22

## 2017-03-19 RX ADMIN — TRAZODONE HYDROCHLORIDE 150 MG: 100 TABLET ORAL at 09:58

## 2017-03-19 RX ADMIN — BISACODYL 10 MG: 10 SUPPOSITORY RECTAL at 09:58

## 2017-03-19 RX ADMIN — BACITRACIN 500 G: 500 OINTMENT TOPICAL at 09:58

## 2017-03-19 RX ADMIN — HYDROCODONE BITARTRATE AND ACETAMINOPHEN 1 TABLET: 7.5; 325 TABLET ORAL at 05:38

## 2017-03-19 RX ADMIN — HYDROMORPHONE HYDROCHLORIDE 1 MG: 1 INJECTION, SOLUTION INTRAMUSCULAR; INTRAVENOUS; SUBCUTANEOUS at 00:12

## 2017-03-19 RX ADMIN — ACETAMINOPHEN 650 MG: 325 TABLET ORAL at 00:10

## 2017-03-19 RX ADMIN — TRAZODONE HYDROCHLORIDE 150 MG: 100 TABLET ORAL at 01:47

## 2017-03-19 NOTE — ROUTINE PROCESS
Bedside and Verbal shift change report given to NIMA Lawton RN (oncoming nurse) by Lara Jean Baptiste RN (offgoing nurse). Report included the following information SBAR, Kardex, Procedure Summary, Intake/Output, MAR, Recent Results and Med Rec Status.

## 2017-03-19 NOTE — ROUTINE PROCESS
Dual AVS reviewed with Jose Dumont RN. All medications reviewed individually with patient. Opportunities for questions and concerns provided. Patient discharged via (mode of transport ie. Car, ambulance or air transport) wheelchair to front entrance with RN. Patient's arm band appropriately discarded.         Pt had uneventful shift and tolerated medications well  Patient Vitals for the past 8 hrs:   Temp Pulse Resp BP SpO2   03/19/17 1120 97.6 °F (36.4 °C) 64 16 126/69 97 %

## 2017-03-19 NOTE — DISCHARGE INSTRUCTIONS
Thyroid Surgery: What to Expect at Home  Your Recovery    Your doctor made a cut (incision) in your neck and removed part of your thyroid gland to find what is causing a lump or to remove a growth in the gland. The piece removed may have been large or small. Your doctor may have removed all of your thyroid if there was cancer or another problem. He or she did a test on a small sample of the tissue from your thyroid and closed the incision in your neck with stitches. Keep the incision covered with the bandage and dry for 48 hours. A small amount of bleeding can be expected. Ask your doctor how much drainage to expect. You may go home on the same day or stay one or more nights in the hospital after surgery. You may be able to return to work or your normal routine in 1 to 2 weeks. This depends on whether you need more treatment, how you feel, and the kind of work you do. Your doctor will check your incision about a week after surgery. You may need to take thyroid medicine. If you have thyroid cancer, you may need to have radioactive iodine therapy. Your doctor will talk to you about what happens next. You will feel some pain for several days. You may have some nausea and general muscle aches and may feel tired for 1 to 2 days. You also may have a sore throat and sound hoarse. This care sheet gives you a general idea about how long it will take for you to recover. But each person recovers at a different pace. Follow the steps below to feel better as quickly as possible. How can you care for yourself at home? Activity  · Rest when you feel tired. Getting enough sleep will help you recover. · Most people are able to return to work a few days after surgery, but this can depend on what type of work you do. Diet  · You can eat your normal diet. If your stomach is upset, try bland, low-fat foods like plain rice, broiled chicken, toast, and yogurt.   Medicines  · Your doctor will tell you if and when you can restart your medicines. He or she will also give you instructions about taking any new medicines. · If you take blood thinners, such as warfarin (Coumadin), clopidogrel (Plavix), or aspirin, be sure to talk to your doctor. He or she will tell you if and when to start taking those medicines again. Make sure that you understand exactly what your doctor wants you to do. · Suck on throat lozenges or gargle with warm salt water to help your sore throat. · Be safe with medicines. Take pain medicines exactly as directed. ¨ If the doctor gave you a prescription medicine for pain, take it as prescribed. ¨ If you are not taking a prescription pain medicine, ask your doctor if you can take an over-the-counter medicine. · If you think your pain medicine is making you sick to your stomach:  ¨ Take your medicine after meals (unless your doctor has told you not to). ¨ Ask your doctor for a different pain medicine. Incision care  · If you have strips of tape on the cut (incision) the doctor made, leave the tape on for a week or until it falls off. Or follow your doctor's instructions for removing the tape. · Keep the area clean and dry. · You will have a dressing over the cut (incision). A dressing helps the incision heal and protects it. Your doctor will tell you how to take care of this. Exercise  · Avoid strenuous activities, such as bicycle riding, jogging, weight lifting, or aerobic exercise, until your doctor says it is okay. Elevation  · You may be more comfortable if you keep your head up on a pillow when you lie down. Support the back of your head and neck with both hands when you sit up to prevent discomfort. Follow-up care is a key part of your treatment and safety. Be sure to make and go to all appointments, and call your doctor if you are having problems. It's also a good idea to know your test results and keep a list of the medicines you take. When should you call for help?   Call 911 anytime you think you may need emergency care. For example, call if:  · You have severe trouble breathing. Call your doctor now or seek immediate medical care if:  · You have a lot of bleeding through the bandage. · You have a hard time swallowing. · You have trouble breathing. · You have new or worsening pain. · You have symptoms of infection, such as:  ¨ Increased pain, swelling, warmth, or redness. ¨ Red streaks leading from the incision. ¨ Pus draining from the incision. ¨ A fever. Watch closely for any changes in your health, and be sure to contact your doctor if:  · You're not getting better as expected. · You notice a change in your voice. Where can you learn more? Go to http://flora-sarkis.info/. Enter V203 in the search box to learn more about \"Thyroid Surgery: What to Expect at Home. \"  Current as of: July 28, 2016  Content Version: 11.1  © 2971-2838 [employer]. Care instructions adapted under license by [employer] (which disclaims liability or warranty for this information). If you have questions about a medical condition or this instruction, always ask your healthcare professional. [place] [address] any warranty or liability for your use of this information.   Patient armband removed and shredded    DISCHARGE SUMMARY from Nurse    The following personal items are in your possession at time of discharge:    Dental Appliances: None  Visual Aid: Glasses     Home Medications: Sent home (Given to Jan Joaquin )  Jewelry: None  Clothing: Pants, Footwear, [address], Sent home, Socks (Given to Jan Joaquin )  Other Valuables: [place], Rissa Rasher, Walker (given to Jan Joaquin )             PATIENT INSTRUCTIONS:    After general anesthesia or intravenous sedation, for 24 hours or while taking prescription Narcotics:  · Limit your activities  · Do not drive and operate hazardous machinery  · Do not make important personal or business decisions  · Do  not drink alcoholic beverages  · If you have not urinated within 8 hours after discharge, please contact your surgeon on call. Report the following to your surgeon:  · Excessive pain, swelling, redness or odor of or around the surgical area  · Temperature over 100.5  · Nausea and vomiting lasting longer than 4 hours or if unable to take medications  · Any signs of decreased circulation or nerve impairment to extremity: change in color, persistent  numbness, tingling, coldness or increase pain  · Any questions        What to do at Home:  Recommended activity: Activity as tolerated, No heavy lifting, pushing, pulling and No driving while on analgesics    If you experience any of the following symptoms fever greater than 100.4, increased redness and swelling with fowl/discolored drainage, separation of incision, please follow up with ED or doctor. *  Please give a list of your current medications to your Primary Care Provider. *  Please update this list whenever your medications are discontinued, doses are      changed, or new medications (including over-the-counter products) are added. *  Please carry medication information at all times in case of emergency situations. These are general instructions for a healthy lifestyle:    No smoking/ No tobacco products/ Avoid exposure to second hand smoke    Surgeon General's Warning:  Quitting smoking now greatly reduces serious risk to your health. Obesity, smoking, and sedentary lifestyle greatly increases your risk for illness    A healthy diet, regular physical exercise & weight monitoring are important for maintaining a healthy lifestyle    You may be retaining fluid if you have a history of heart failure or if you experience any of the following symptoms:  Weight gain of 3 pounds or more overnight or 5 pounds in a week, increased swelling in our hands or feet or shortness of breath while lying flat in bed.   Please call your doctor as soon as you notice any of these symptoms; do not wait until your next office visit. Recognize signs and symptoms of STROKE:    F-face looks uneven    A-arms unable to move or move unevenly    S-speech slurred or non-existent    T-time-call 911 as soon as signs and symptoms begin-DO NOT go       Back to bed or wait to see if you get better-TIME IS BRAIN. Warning Signs of HEART ATTACK     Call 911 if you have these symptoms:   Chest discomfort. Most heart attacks involve discomfort in the center of the chest that lasts more than a few minutes, or that goes away and comes back. It can feel like uncomfortable pressure, squeezing, fullness, or pain.  Discomfort in other areas of the upper body. Symptoms can include pain or discomfort in one or both arms, the back, neck, jaw, or stomach.  Shortness of breath with or without chest discomfort.  Other signs may include breaking out in a cold sweat, nausea, or lightheadedness. Don't wait more than five minutes to call 911 - MINUTES MATTER! Fast action can save your life. Calling 911 is almost always the fastest way to get lifesaving treatment. Emergency Medical Services staff can begin treatment when they arrive -- up to an hour sooner than if someone gets to the hospital by car. The discharge information has been reviewed with the patient. The patient verbalized understanding. Discharge medications reviewed with the patient and appropriate educational materials and side effects teaching were provided. [employer] Activation    Thank you for requesting access to [employer]. Please follow the instructions below to securely access and download your online medical record. [employer] allows you to send messages to your doctor, view your test results, renew your prescriptions, schedule appointments, and more. How Do I Sign Up? 1. In your internet browser, go to [email or web address]  2. Click on the First Time User? Click Here link in the Sign In box.  You will be redirect to the New Member Sign Up page. 3. Enter your [employer] Access Code exactly as it appears below. You will not need to use this code after youve completed the sign-up process. If you do not sign up before the expiration date, you must request a new code. MyChart Access Code: Activation code not generated  Current [employer] Status: Active (This is the date your [employer] access code will )    4. Enter the last four digits of your Social Security Number (xxxx) and Date of Birth (mm/dd/yyyy) as indicated and click Submit. You will be taken to the next sign-up page. 5. Create a [employer] ID. This will be your [employer] login ID and cannot be changed, so think of one that is secure and easy to remember. 6. Create a [employer] password. You can change your password at any time. 7. Enter your Password Reset Question and Answer. This can be used at a later time if you forget your password. 8. Enter your e-mail address. You will receive e-mail notification when new information is available in [address]. 9. Click Sign Up. You can now view and download portions of your medical record. 10. Click the Download Summary menu link to download a portable copy of your medical information. Additional Information    If you have questions, please visit the Frequently Asked Questions section of the [employer] website at [email or web address] Remember, [employer] is NOT to be used for urgent needs. For medical emergencies, dial 911.

## 2017-03-19 NOTE — ROUTINE PROCESS
Bedside and Verbal shift change report given to [facility], RN (oncoming nurse) by Ember Washington RN (offgoing nurse). Report included the following information SBAR, Kardex, OR Summary, Procedure Summary, Intake/Output, MAR, Recent Results and Med Rec Status.

## 2017-03-19 NOTE — DISCHARGE SUMMARY
Dion Randall 58 SUMMARY    Name:  Arya Jo  MR#:  [id number]  :  1960  Account #:  [de-identified]  Date of Adm:  2017  Date of Discharge:      ADMISSION DIAGNOSIS: Thyromegaly. DISCHARGE DIAGNOSIS: Thyromegaly. DISCHARGE DIET: Regular diet. DISCHARGE ACTIVITY: Light activity. DISCHARGE MEDICATIONS: The patient will resume all preoperative  medications. The patient also given a prescription for Cipro 500 p.o.  b.i.d. x7 days, as well as Norco 7.5/325 one tab every 6 hours p.r.n. for  pain. SPECIAL INSTRUCTIONS: The patient to follow up with me in 1  week's time for suture removal.    HOSPITAL COURSE: The patient was admitted, underwent a right  thyroid lobectomy. The patient was noted to have a massive  thyromegaly, which could be somewhat difficult to remove due to the  patient's morbid obesity, as well as the partial substernal nature of the  thyroid gland itself. The patient was noted to have the inferior most  portion of the thyroid gland into the substernal area retroclavicular. This, however, was removed via the cervical approach without having  to split the chest.    Postoperatively, the patient did well. She was placed on the floor after  successful intubation was noted intraoperatively. The patient did have  some headaches, as well as some nausea on postoperative day 1. Hence, the patient was not discharged on postoperative day 1 as  planned. The patient was subsequently discharged on postoperative  day 2, she did have some constipation; however, was treated with  Dulcolax prior to her discharge. The patient will followup with me in 1  week's time for suture removal.    Please note a frozen section was not obtained due to the patient's  adamant instructions to me preoperatively that she only wanted a right  thyroid lobectomy irregardless whether a cancer was found or not.     I advised her that this is not the traditional nor the accepted standard of  practice; however, the patient was insistent that she only requested a  right thyroid lobectomy irregardless of any frozen section  determination. A frozen section was not obtained therefore, because of  this reasoning. We will review the patient's final pathology once she has followup in  office. We will send a note to Dr. Tony Trejo and Dr. Vimal Acevedo, Endocrinology.         Michelle Pat MD    PM / PAUL  D:  03/19/2017   09:12  T:  03/19/2017   10:40  Job #:  [id number]

## 2017-03-19 NOTE — PROGRESS NOTES
Shift summary: Patient rested during shift, significantly more than previous night. No complaints of nausea entire shift. FEDERICO drain output at 17 ml for shift. Patient remains with complaints of pain, patients IV site compromised. Patient not wishing to obtain new IV access at this time, patient alternatively took Norco 7.5-325mg for pain management.

## 2017-03-20 ENCOUNTER — TELEPHONE (OUTPATIENT)
Dept: CARDIOTHORACIC SURGERY | Age: 57
End: 2017-03-20

## 2017-03-20 DIAGNOSIS — I71.21 ASCENDING AORTIC ANEURYSM: Primary | ICD-10-CM

## 2017-03-20 NOTE — PROGRESS NOTES
Speech Therapy Screening:  Services are indicated and therapy order is requested. An InBasket screening referral was triggered for speec therapy based on results obtained during the nursing admission assessment. The patients chart was reviewed and the patient is appropriate for a skilled therapy evaluation. Please order a consult for speech therapy if you are in agreement and would like an evaluation to be completed. Thank you.     Leigh Lugo, SLP

## 2017-03-20 NOTE — PROGRESS NOTES
PT    Physical Therapy Screening:  Services are not indicated at this time. An InBasket screening referral was triggered for physical therapy based on results obtained during the nursing admission assessment. The patients chart was reviewed and the patient is not appropriate for a skilled therapy evaluation at this time. Please consult physical therapy if any therapy needs arise. Thank you.     Troy Schmitt PT, DPT

## 2017-03-20 NOTE — TELEPHONE ENCOUNTER
Patient called to see Dr. Saranya Fair. She just had surgery for her thyroid. I advised that I would review with  to see if she even needed to see us. She already saw Dr. Chidi Mercer. Thought she needed to see Dr. Saranya Fair too after her surgery. I will find out from Dr. Saranya Fair and give her a call back tomorrow. She said that would be fine.

## 2017-03-21 ENCOUNTER — PATIENT OUTREACH (OUTPATIENT)
Dept: FAMILY MEDICINE CLINIC | Age: 57
End: 2017-03-21

## 2017-03-21 ENCOUNTER — TELEPHONE (OUTPATIENT)
Dept: CARDIOTHORACIC SURGERY | Age: 57
End: 2017-03-21

## 2017-03-21 NOTE — TELEPHONE ENCOUNTER
I  left a VM to give patient an update on  Her visit with Dr. Lamin Brito coming up. Per dr. Lamin Brito she is to continue seeing Dr. Yara Alston for any Cardiac issue and we weill see her in Aug for her ascending aortic aneurysm surveillance. \"She is to follow up with Dr Yara Alston for cardiac issues, and will have an MRA in August of this year that I will follow-up on. \" -MD MIGNON,KEMAR

## 2017-03-21 NOTE — PROGRESS NOTES
Transition of [facility] Follow Up for [facility] Admission from 3/17/17 - 3/19/17 for plan surgery: Right Thyroidectomy. Called patient 3/21/2017. Verified two identifyers. She is sore at surgery site, weak, taking short walks. Staying with a friend until tomorrow. Constipation, taking Colace per Surgeon's recommendation, had small BP yesterday, drinking water, ate 1/2 bananas and no vegetables since discharge. Encouraged her to increase fiber in her diet by eaitng fruit and vegetables. Coughing clear phlegm since am.Offered sooner PCP appointment, patient declines, plans to discuss cough with Surgeon this Friday. Encouraged her to call if she changes her mind concerning sooner PCP appointment. Medication Reconciliation completed: yes New medications at discharge include Norco and Cipro. Medications discontinued include Motrin, many of her vitamins and supplement are on hold until seen by her Surgeon this Friday with plans to resume them after this appointment. .    Plan: Patient will keep her [facility] appointment scheduled with her Surgeon for 3/24/17, PCP 3/29/17 and Endocrinologist first              week of April. Encouraged patient to contact me if she has questions or concerns. Will plan to call her Monday.

## 2017-03-27 ENCOUNTER — PATIENT OUTREACH (OUTPATIENT)
Dept: FAMILY MEDICINE CLINIC | Age: 57
End: 2017-03-27

## 2017-03-27 NOTE — PROGRESS NOTES
Transitional Care Follow up         Called patient on 3/27/2017, left voice mail for patient to return my call.

## 2017-03-29 ENCOUNTER — OFFICE VISIT (OUTPATIENT)
Dept: FAMILY MEDICINE CLINIC | Age: 57
End: 2017-03-29

## 2017-03-29 VITALS
SYSTOLIC BLOOD PRESSURE: 146 MMHG | HEART RATE: 70 BPM | HEIGHT: 72 IN | BODY MASS INDEX: 39.68 KG/M2 | TEMPERATURE: 97.9 F | WEIGHT: 293 LBS | DIASTOLIC BLOOD PRESSURE: 88 MMHG | RESPIRATION RATE: 17 BRPM

## 2017-03-29 DIAGNOSIS — E66.01 MORBID OBESITY DUE TO EXCESS CALORIES (HCC): ICD-10-CM

## 2017-03-29 DIAGNOSIS — M54.2 NECK PAIN: Primary | ICD-10-CM

## 2017-03-29 DIAGNOSIS — E89.0 S/P PARTIAL THYROIDECTOMY: ICD-10-CM

## 2017-03-29 NOTE — MR AVS SNAPSHOT
Visit Information Date & Time Provider Department Dept. Phone Encounter #  
 3/29/2017  1:15 PM Panda Moreno [id number] Follow-up Instructions Return if symptoms worsen or fail to improve. Your Appointments 8/17/2017  2:00 PM  
Any with Florence Matos MD  
CARDIOVASCULAR AND THORACIC SPEC (ALBA SCHEDULING) Appt Note: followup from a MRI; dr. Raj Boothe is referring her to Dr. Lezama Daily; 1 year f/U ascending aortic aneurysm surveillance [facility] [address] [address]  
[phone] [facility] [address] [address] Upcoming Health Maintenance Date Due COLONOSCOPY 1/1/2015 MEDICARE YEARLY EXAM 8/10/2017 BREAST CANCER SCRN MAMMOGRAM 4/15/2018 PAP AKA CERVICAL CYTOLOGY 8/9/2019 DTaP/Tdap/Td series (2 - Td) 10/29/2020 Allergies as of 3/29/2017  Review Complete On: 3/29/2017 By: Nohemy Aranda LPN Severity Noted Reaction Type Reactions Oxycodone High 10/09/2013    Anaphylaxis, Hives Adhesive Tape-silicones  43/32/7926    Rash Paper tape is okay to use. Celebrex [Celecoxib]  10/09/2013    Hives Contrast Dye [Iodine]  03/21/2016    Nausea Only Abdominal pain, dizziness Cortisone  08/15/2013    Nausea and Vomiting Flexeril [Cyclobenzaprine]  08/04/2013    Nausea and Vomiting Pt states also causes confusion Keflex [Cephalexin]  11/30/2016    Diarrhea, Nausea Only Joint pain Current Immunizations  Reviewed on 3/29/2017 Name Date Influenza Vaccine Split 10/24/2012, 10/17/2011, 10/29/2010 TDAP Vaccine 10/29/2010 Reviewed by Nohemy Aranda LPN on 9/47/1529 at  1:29 PM  
You Were Diagnosed With   
  
 Codes Comments S/P partial thyroidectomy    -  Primary ICD-10-CM: E89.0 ICD-9-CM: V45.89 Vitals BP Pulse Temp Resp Height(growth percentile) Weight(growth percentile) 146/88 70 97.9 °F (36.6 °C) (Oral) 17 6' 1\" (1.854 m) (!) 410 lb (186 kg) LMP BMI OB Status Smoking Status 03/15/2017 54.09 kg/m2 Having regular periods Never Smoker Vitals History BMI and BSA Data Body Mass Index Body Surface Area 54.09 kg/m 2 3.1 m 2 Preferred Pharmacy Pharmacy Name Phone [facility], [address] [place] [address] [phone] Your Updated Medication List  
  
   
This list is accurate as of: 3/29/17  2:00 PM.  Always use your most recent med list.  
  
  
  
  
 albuterol 90 mcg/actuation inhaler Commonly known as:  VENTOLIN HFA Take 2 Puffs by inhalation every four (4) hours as needed for Wheezing. Cholecalciferol (Vitamin D3) 50,000 unit Cap Take  by mouth Every Mon, Wed & Sun.  
  
 cinnamon bark (bulk) Powd Take 40 mg by mouth daily. ciprofloxacin HCl 500 mg tablet Commonly known as:  CIPRO Take 1 Tab by mouth every twelve (12) hours. clotrimazole-betamethasone topical cream  
Commonly known as:  Molina Michael Apply  to affected area two (2) times a day. COQ10  PO Take 1 Tab by mouth daily. EPINEPHrine 0.3 mg/0.3 mL injection Commonly known as:  EPIPEN  
0.3 mL by IntraMUSCular route once as needed for Anaphylaxis for up to 1 dose. Ok to dispense #2 if box contains 2 pens. FISH OIL 1,000 mg Cap Generic drug:  omega-3 fatty acids-vitamin e Take 1 Cap by mouth daily. flaxseed oil 1,000 mg Cap Take 1,000 mg by mouth daily. guaiFENesin-codeine 100-10 mg/5 mL solution Commonly known as:  ROBITUSSIN AC Take 10 mL by mouth four (4) times daily as needed for Cough. Max Daily Amount: 40 mL. HYDROcodone-acetaminophen 7.5-325 mg per tablet Commonly known as:  Soledad President Take 1 Tab by mouth every six (6) hours as needed. Max Daily Amount: 4 Tabs. LORazepam 1 mg tablet Commonly known as:  ATIVAN Take 1 Tab by mouth daily as needed for Anxiety (Severe). MAGNESIUM PO Take 400 mg by mouth daily. OTHER Ground seaweed - 1 tsp. Daily. traZODone 150 mg tablet Commonly known as:  Freitas Roni Take 1 Tab by mouth three (3) times daily. tretinoin 0.05 % topical cream  
Commonly known as:  RETIN-A Apply to affected area at night time. TURMERIC (BULK) Take 40 mg by mouth daily. VITAMIN B COMPLEX PO Take 1 Cap by mouth daily. VITAMIN C 250 mg tablet Generic drug:  ascorbic acid (vitamin C) Take 250 mg by mouth daily. vitamin e 200 unit capsule Commonly known as:  [address] Take 200 Units by mouth daily. Follow-up Instructions Return if symptoms worsen or fail to improve. To-Do List   
 08/17/2017 10:00 AM  
(Arrive by 9:30 AM) Appointment with [facility] MRI RM 1 at [facility]  [place] ([phone]) GENERAL INSTRUCTIONS  Bring information (ID card) if you have any medically implanted devices. You will be required to lie still while the procedure is being performed. Remove any jewelry (including body piercing, hairpins) prior to MRI. If you have had a creatinine level drawn within the past 30 days, please bring most recent results to your appt. Bring any films, CD's, and reports related to your study with you on the day of your exam.  This only includes studies done outside of [address] [place] [address], \[facility]\"", [place], and [place]. Bring a complete list of all medications you are currently taking to include prescriptions, over-the-counter meds, herbals, vitamins & any dietary supplements. If you were given medications for claustrophobia or anxiety, please arrange to have someone drive you to your appointment. QUESTIONS  Notify the MRI Department if you have any questions concerning your study. Krist[place] - [phone] [address] [phone] Patient Instructions Thyroidectomy: What to Expect at [facility] Your Recovery Thyroidectomy is the removal of the thyroid gland, which is shaped like a butterfly and lies across the windpipe (trachea). The gland makes hormones that control how your body makes and uses energy (metabolism). A doctor removes the gland when a tumor is present. The doctor may also remove the gland if you have an enlarged thyroid that is causing symptoms that bother you. Most tumors that grow in the thyroid gland are benign, meaning they are not cancer. You may leave the hospital with stitches in the cut (incision) the doctor made. Your doctor will tell you if you need to come back to have these removed. You may still have a tube called a drain in your neck. Your doctor will take this out a few days after your surgery. You may have some trouble chewing and swallowing after you go home. Your voice probably will be hoarse, and you may have trouble talking. For most people, these problems get better within 3 to 4 months, but it can take as long as a year. In some cases, this surgery causes permanent problems with chewing, speaking, or swallowing. This care sheet gives you a general idea about how long it will take for you to recover. But each person recovers at a different pace. Follow the steps below to get better as quickly as possible. How can you care for yourself at home? Activity · Rest when you feel tired. Getting enough sleep will help you recover. When you lie down, put two or three pillows under your head to keep it raised. · Try to walk each day. Start by walking a little more than you did the day before. Bit by bit, increase the amount you walk. Walking boosts blood flow and helps prevent pneumonia and constipation. · Avoid strenuous physical activity and lifting heavy objects for 3 weeks after surgery or until your doctor says it is okay. · Do not over-extend your neck backwards for 2 weeks after surgery. · Ask your doctor when you can drive again. · You may take a shower, unless you still have a drain near your incision. Pat the incision dry. If you have a drain, follow your doctor's instructions to care for it. Diet · If it is painful to swallow, start out with cold drinks, flavored ice pops, and ice cream. Next, try soft foods like pudding, yogurt, canned or cooked fruit, scrambled eggs, and mashed potatoes. Avoid eating hard or scratchy foods like chips or raw vegetables. Avoid orange or tomato juice and other acidic foods that can sting the throat. · If you cough right after drinking, try drinking thicker liquids, such as a smoothie. · You may notice that your bowel movements are not regular right after your surgery. This is common. Try to avoid constipation and straining with bowel movements. You may want to take a fiber supplement every day. If you have not had a bowel movement after a couple of days, ask your doctor about taking a mild laxative. Medicines · Your doctor will tell you if and when you can restart your medicines. He or she will also give you instructions about taking any new medicines. · If you take blood thinners, such as warfarin (Coumadin), clopidogrel (Plavix), or aspirin, be sure to talk to your doctor. He or she will tell you if and when to start taking those medicines again. Make sure that you understand exactly what your doctor wants you to do. · Be safe with medicines. Take pain medicines exactly as directed. ¨ If the doctor gave you a prescription medicine for pain, take it as prescribed. ¨ If you are not taking a prescription pain medicine, ask your doctor if you can take an over-the-counter medicine. · If you think your pain medicine is making you sick to your stomach: 
¨ Take your medicine after meals (unless your doctor has told you not to). ¨ Ask your doctor for a different pain medicine.  
· Your doctor may prescribe calcium to prevent problems after surgery from low calcium. Not having enough calcium can cause symptoms such as tingling around your mouth or in your hands and feet. · Your doctor may have prescribed antibiotics. Take them as directed. Do not stop taking them just because you feel better. You need to take the full course of antibiotics. Incision care · If your doctor told you how to care for your incision, follow your doctor's instructions. If you did not get instructions, follow this general advice: ¨ After the first 24 to 48 hours, wash around the wound with clean water 2 times a day. Don't use hydrogen peroxide or alcohol, which can slow healing. · You may have a drain near your incision. Your doctor will tell you how to take care of it. Follow-up care is a key part of your treatment and safety. Be sure to make and go to all appointments, and call your doctor if you are having problems. It's also a good idea to know your test results and keep a list of the medicines you take. When should you call for help? Call 911 anytime you think you may need emergency care. For example, call if: 
· You passed out (lost consciousness). · You have sudden chest pain and shortness of breath, or you cough up blood. · You have severe trouble breathing. Call your doctor now or seek immediate medical care if: 
· You have loose stitches, or your incision comes open. · Bleeding from your incision soaks through your bandages. · You have signs of infection, such as: 
¨ Increased pain, swelling, warmth, or redness. ¨ Red streaks leading from the incision. ¨ Pus draining from the incision. ¨ A fever. · You have a tingling feeling around your mouth. · You have cramping or tingling in your hands and feet. Watch closely for changes in your health, and be sure to contact your doctor if: 
· You have trouble talking. · You are sick to your stomach or cannot keep fluids down. · You do not have a bowel movement after taking a laxative. Where can you learn more? Go to http://flora-sarkis.info/. Enter [id number] in the search box to learn more about \"Thyroidectomy: What to Expect at Home. \" Current as of: July 28, 2016 Content Version: 11.2 © 1475-1187 [employer]. Care instructions adapted under license by [employer] (which disclaims liability or warranty for this information). If you have questions about a medical condition or this instruction, always ask your healthcare professional. [address] any warranty or liability for your use of this information. Introducing [facility]! Dear Stella Garcia: Thank you for requesting a [employer] account. Our records indicate that you already have an active [employer] account. You can access your account anytime at [email or web address] Did you know that you can access your hospital and ER discharge instructions at any time in [employer]? You can also review all of your test results from your hospital stay or ER visit. Additional Information If you have questions, please visit the Frequently Asked Questions section of the [employer] website at [email or web address] Remember, [employer] is NOT to be used for urgent needs. For medical emergencies, dial 911. Now available from your iPhone and Android! Please provide this summary of care documentation to your next provider. Your primary care clinician is listed as Moo Renteria. If you have any questions after today's visit, please call [phone].

## 2017-03-29 NOTE — PROGRESS NOTES
1. Have you been to the ER, urgent care clinic since your last visit? Hospitalized since your last visit? Yes When: Friday March 17, 2017 Where: [facility] Reason for visit: Thyroid tumor removal    2. Have you seen or consulted any other health care providers outside of the [address] [place] [address] since your last visit? Include any pap smears or colon screening.  No

## 2017-03-29 NOTE — OP NOTES
[address] [place] [address]  OPERATIVE REPORT    Name:  Mitra Joyce  MR#:  [id number]  :  1960  Account #:  [de-identified]  Date of Adm:  2017  Date of Surgery:  2017      PREOPERATIVE DIAGNOSIS: Massive thyromegaly. POSTOPERATIVE DIAGNOSIS: Massive thyromegaly. PROCEDURES PERFORMED: Removal of massive goiter with  substernal extension, right side. SURGEON: Dixie Faria MD    ASSISTANT: None. SPECIMENS REMOVED: to path    ANESTHESIA: General endotracheal anesthesia. OPERATIVE INDICATIONS: The patient is a morbidly obese 48-year-  old female who has had a marked difficulty with thyromegaly, right  side. The patient is noted to have some dysphagia as well as a  pressure sensation in throat. The patient to undergo procedure. Please note, the patient is morbidly obese at approximately 450  pounds. The patient also noted to have a massive thyroid goiter with  some substernal extension. This was noted to be below the level of the  clavicles and into the chest area. Positioning of the patient very difficult  as well as the massive thyroid tissue and the morbid obesity, all make  this for a very technically difficult surgery. ESTIMATED BLOOD LOSS: 75 mL. COMPLICATIONS: None. DESCRIPTION OF FINDINGS  1. Markedly enlarged thyroid goiter, right side. 2. Substernal and subclavicular extension of the thyroid goiter noted. 3. Recurrent laryngeal nerve found in the usual position. 4. Parathyroid glands identified and kept intact. 5. Frozen section not performed. The patient preoperatively had  adamantly inform me that she did not want a total thyroidectomy  regardless of the findings of frozen section, hence frozen section not  performed at this setting. PROCEDURE: The patient was brought to the operating room, placed  supine on the operating room table. General endotracheal anesthesia  was given per Anesthesiology department.  Once the patient was  sufficiently anesthetized, the patient's neck was prepped and draped. It  was very difficult to extend the patient's head due to the morbid  obesity, taping of the patient's chest was performed to allow for easier  access to the neck; however, this still proved to be fairly difficult at this  point. Once the patient was prepped and draped, a skin incision was made  approximately 2.5 fingerbreadths above the sternal notch from the  anterior border of the sternocleidomastoid on the right to the left side. Once this was created, the incision was carried down through the skin,  subcutaneous tissue and platysma muscle. Once the platysmal muscle  incised, the skin muscle flaps were then created in the subplatysmal  plane superiorly to the thyroid cartilage, inferior to the sternal notch. The skin muscle flaps were sutured to the drapes. The strap muscles  were opened in the midline. It was very difficult to find the midline due  to significant deviation of the patient's trachea and strap musculature  itself. Once the midline was found to the left of midline, this was  opened up down to the level of the trachea and thyroid isthmus. Again,  was significant deviation of the trachea to the left side, some significant  thyroid tissue was noted pushing the isthmus over as well. The right lobe of the thyroid gland was then attempted to be mobilized. The strap muscles were elevated off the right thyroid lobe itself in the  plane between the thyroid gland and the strap muscles. This was  opened as wide as possible; however, the lateral extent of the right  lobe of the thyroid gland could not be identified. At this point, it was  decided that the only mechanisms to safely remove the thyroid gland  would be to incise the strap muscles. The strap muscles were then  incised with the use of Harmonic scalpel.  This was done medial to  lateral to allow for easier access of the lateral aspect of the thyroid  gland in the superior pole. Once the strap muscles were isolated and  ligated, bleeding was controlled. This gained easier access to the  lateral edge of the thyroid gland. The lateral edge of the thyroid gland  was isolated and the middle thyroid vein was visualized. This was  completely freed up. There were a number of branches of the middle  thyroid vein, which were each individually ligated. It should be noted  that at this point, hemostasis was accomplished with use of small  Ligaclips. During the course of the procedure, hemostasis was  accomplished with the use of small and medium Ligaclips, Harmonic  scalpel, bipolar cautery, 2-0 and 3-0 silk sutures. At this point, the lateral edge of the thyroid gland was  from  the carotid sheath from a inferior to superior direction. Once this was  accomplished, the cricothyroid muscle was attempted to be visualized. This was completely occluded with significant thyromegaly. The thyroid  gland was displaced inferiorly. The cricothyroid muscle was identified  and the cricothyroid  from the superior pole of the thyroid  gland. At this point, the superior pole of the thyroid gland was  visualized and the vascular pedicle was identified. The entire  procedure proved to be very difficult due to the massive thyromegaly  noted, as well as the difficulty with retraction. The superior pedicle was  triply ligated with the use of 2-0 silk suture and Ligaclips. Once the  vascular pedicle was controlled, the superior pole was attempted to be  mobilized. This proved as will to be fairly difficult; however, with  retraction and dissection, the superior pole was then delivered from the  depth of the wound itself. Attention now directed inferiorly. The patient's inferior pole was not visualized. The inferior pole was  noted to be substernum and subclavicular in nature.  Due to the  difficulty with extension as well as the massive amount of thyroid tissue  and the obesity, this proved to be very difficult. With blunt and sharp  dissection, the inferior pole was delivered from the infraclavicular area. Great care was maintained to ensure that no vessels were injured  during the course of the procedure. After this was complete, the inferior  pole and pedicle were ligated again with triple ligation. This dissection  was carried forth to the thyroid isthmus. The thyroid isthmus was noted  to be markedly enlarged and generous in size. After the superior pole was freed up from the infraclavicular and  substernal area, this was then attempted to be mobilized. This was  noted to be markedly difficult. There were still significant attachments  noted in the midportion of the thyroid goiter. With both blunt and sharp  dissection, the tissue was dissected away from the thyroid gland with  retraction of the thyroid gland medially. The thyroid gland was finally  able to be rotated out of the wound itself. This made dissection much  easier at this point. Search was made for identification of the recurrent  laryngeal nerve. The recurrent laryngeal nerve was found deep to the  thyroid gland. The superior parathyroid gland was identified close to  the insertion into the cricothyroid membrane. This was dissected free  laterally. The inferior parathyroid gland was also identified during the  course of this dissection. This was kept intact as well. There was a significant amount of vasculature noted during the course  of this dissection, which was handled with the above aforementioned  procedures. After this was accomplished, the fascial attachments between the  trachea and the thyroid gland were lysed. Berry ligament was incised  as well with great care to maintain the integrity of the recurrent  laryngeal nerve. Once this was complete, the thyroid isthmus was  ligated with the use of Harmonic scalpel, a 3-0 silk suture was then  used to ligate the thyroid isthmus.  No difficulties were encountered. The entire specimen was sent to Pathology. Specimen was fairly  massive in size, probably 5-6 times the size of a normal thyroid gland,  perhaps even larger. No difficulties were encountered at this point. The  size of the thyroid gland measured almost 12 cm in greatest size. The  weight was at least 150 grams for the lobe alone. A normal thyroid lobe  would weigh approximately 10 grams; hence, this was almost 15 times  greater than what was to be expected. Once the specimen was sent to Pathology, Valsalva maneuver was  performed. No bleeding was seen. A 10-Japanese fluted drain was then  placed via separate stab wound into the thyroid bed. The strap  muscles were then closed. The cut edges of the strap musculature  were first closed with 3-0 Vicryl, from thence the midline strap muscles  were then closed as well with a running 3-0 suture. The platysma was  closed with 3-0 suture as well and the skin closed with 5-0 nylon in a  running fashion. The drain was sutured in place. The patient tolerated  this well, was subsequently taken from the operating room to the  recovery room in stable condition after correct needle and sponge  count were obtained.         Michelle Pat MD    PM / RDYAZAN  D:  03/29/2017   06:33  T:  03/29/2017   09:53  Job #:  [id number]
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
## 2017-03-29 NOTE — PATIENT INSTRUCTIONS
Thyroidectomy: What to Expect at Home  Your Recovery    Thyroidectomy is the removal of the thyroid gland, which is shaped like a butterfly and lies across the windpipe (trachea). The gland makes hormones that control how your body makes and uses energy (metabolism). A doctor removes the gland when a tumor is present. The doctor may also remove the gland if you have an enlarged thyroid that is causing symptoms that bother you. Most tumors that grow in the thyroid gland are benign, meaning they are not cancer. You may leave the hospital with stitches in the cut (incision) the doctor made. Your doctor will tell you if you need to come back to have these removed. You may still have a tube called a drain in your neck. Your doctor will take this out a few days after your surgery. You may have some trouble chewing and swallowing after you go home. Your voice probably will be hoarse, and you may have trouble talking. For most people, these problems get better within 3 to 4 months, but it can take as long as a year. In some cases, this surgery causes permanent problems with chewing, speaking, or swallowing. This care sheet gives you a general idea about how long it will take for you to recover. But each person recovers at a different pace. Follow the steps below to get better as quickly as possible. How can you care for yourself at home? Activity  · Rest when you feel tired. Getting enough sleep will help you recover. When you lie down, put two or three pillows under your head to keep it raised. · Try to walk each day. Start by walking a little more than you did the day before. Bit by bit, increase the amount you walk. Walking boosts blood flow and helps prevent pneumonia and constipation. · Avoid strenuous physical activity and lifting heavy objects for 3 weeks after surgery or until your doctor says it is okay. · Do not over-extend your neck backwards for 2 weeks after surgery.   · Ask your doctor when you can drive again. · You may take a shower, unless you still have a drain near your incision. Pat the incision dry. If you have a drain, follow your doctor's instructions to care for it. Diet  · If it is painful to swallow, start out with cold drinks, flavored ice pops, and ice cream. Next, try soft foods like pudding, yogurt, canned or cooked fruit, scrambled eggs, and mashed potatoes. Avoid eating hard or scratchy foods like chips or raw vegetables. Avoid orange or tomato juice and other acidic foods that can sting the throat. · If you cough right after drinking, try drinking thicker liquids, such as a smoothie. · You may notice that your bowel movements are not regular right after your surgery. This is common. Try to avoid constipation and straining with bowel movements. You may want to take a fiber supplement every day. If you have not had a bowel movement after a couple of days, ask your doctor about taking a mild laxative. Medicines  · Your doctor will tell you if and when you can restart your medicines. He or she will also give you instructions about taking any new medicines. · If you take blood thinners, such as warfarin (Coumadin), clopidogrel (Plavix), or aspirin, be sure to talk to your doctor. He or she will tell you if and when to start taking those medicines again. Make sure that you understand exactly what your doctor wants you to do. · Be safe with medicines. Take pain medicines exactly as directed. ¨ If the doctor gave you a prescription medicine for pain, take it as prescribed. ¨ If you are not taking a prescription pain medicine, ask your doctor if you can take an over-the-counter medicine. · If you think your pain medicine is making you sick to your stomach:  ¨ Take your medicine after meals (unless your doctor has told you not to). ¨ Ask your doctor for a different pain medicine. · Your doctor may prescribe calcium to prevent problems after surgery from low calcium.  Not having enough calcium can cause symptoms such as tingling around your mouth or in your hands and feet. · Your doctor may have prescribed antibiotics. Take them as directed. Do not stop taking them just because you feel better. You need to take the full course of antibiotics. Incision care  · If your doctor told you how to care for your incision, follow your doctor's instructions. If you did not get instructions, follow this general advice:  ¨ After the first 24 to 48 hours, wash around the wound with clean water 2 times a day. Don't use hydrogen peroxide or alcohol, which can slow healing. · You may have a drain near your incision. Your doctor will tell you how to take care of it. Follow-up care is a key part of your treatment and safety. Be sure to make and go to all appointments, and call your doctor if you are having problems. It's also a good idea to know your test results and keep a list of the medicines you take. When should you call for help? Call 911 anytime you think you may need emergency care. For example, call if:  · You passed out (lost consciousness). · You have sudden chest pain and shortness of breath, or you cough up blood. · You have severe trouble breathing. Call your doctor now or seek immediate medical care if:  · You have loose stitches, or your incision comes open. · Bleeding from your incision soaks through your bandages. · You have signs of infection, such as:  ¨ Increased pain, swelling, warmth, or redness. ¨ Red streaks leading from the incision. ¨ Pus draining from the incision. ¨ A fever. · You have a tingling feeling around your mouth. · You have cramping or tingling in your hands and feet. Watch closely for changes in your health, and be sure to contact your doctor if:  · You have trouble talking. · You are sick to your stomach or cannot keep fluids down. · You do not have a bowel movement after taking a laxative. Where can you learn more?   Go to http://audra.info/. Enter [id number] in the search box to learn more about \"Thyroidectomy: What to Expect at Home. \"  Current as of: July 28, 2016  Content Version: 11.2  © 6010-8833 [employer]. Care instructions adapted under license by [employer] (which disclaims liability or warranty for this information). If you have questions about a medical condition or this instruction, always ask your healthcare professional. [place] [address] any warranty or liability for your use of this information.

## 2017-03-30 ENCOUNTER — TELEPHONE (OUTPATIENT)
Dept: FAMILY MEDICINE CLINIC | Age: 57
End: 2017-03-30

## 2017-03-30 NOTE — TELEPHONE ENCOUNTER
Calling to let you know that the surgeon gave her bacitracin, but just a small amount. Wanting to know if you could giver her a large tube.

## 2017-03-31 ENCOUNTER — TELEPHONE (OUTPATIENT)
Dept: FAMILY MEDICINE CLINIC | Age: 57
End: 2017-03-31

## 2017-03-31 NOTE — TELEPHONE ENCOUNTER
Pt. Waiting for phone call for bacitracin from the last phone call to have filled. Please check last phone call message from Kaushik Miller to Chad Krueger. Pt. Wants to know when it can be done. Please call.

## 2017-04-01 DIAGNOSIS — E89.0 S/P THYROIDECTOMY: Primary | ICD-10-CM

## 2017-04-01 RX ORDER — BACITRACIN ZINC 500 UNIT/G
OINTMENT (GRAM) TOPICAL 2 TIMES DAILY
Qty: 30 G | Refills: 0 | Status: SHIPPED | OUTPATIENT
Start: 2017-04-01 | End: 2017-08-30 | Stop reason: ALTCHOICE

## 2017-04-03 NOTE — TELEPHONE ENCOUNTER
Spoke to patient and let her know that provider sent in bacitracin to [address]. Patient was advised to call office with any problems. Patient verbalized understanding of instructions.

## 2017-04-19 NOTE — PROGRESS NOTES
4/19/2017     Patient did not return my last call. Seen by PCP 3/29/17. Transitional Care Episode resolved, met goals, episode closed.

## 2017-05-09 ENCOUNTER — HOSPITAL ENCOUNTER (OUTPATIENT)
Dept: LAB | Age: 57
Discharge: HOME | End: 2017-05-09
Payer: MEDICARE

## 2017-05-09 DIAGNOSIS — E04.2 NONTOXIC MULTINODULAR GOITER: ICD-10-CM

## 2017-05-09 LAB
T4 FREE SERPL-MCNC: 0.8 NG/DL (ref 0.7–1.5)
TSH SERPL DL<=0.05 MIU/L-ACNC: 3.57 UIU/ML (ref 0.36–3.74)

## 2017-05-09 PROCEDURE — 36415 COLL VENOUS BLD VENIPUNCTURE: CPT | Performed by: INTERNAL MEDICINE

## 2017-05-09 PROCEDURE — 84439 ASSAY OF FREE THYROXINE: CPT | Performed by: INTERNAL MEDICINE

## 2017-05-09 PROCEDURE — 84443 ASSAY THYROID STIM HORMONE: CPT | Performed by: INTERNAL MEDICINE

## 2017-05-24 ENCOUNTER — HOSPITAL ENCOUNTER (OUTPATIENT)
Dept: LAB | Age: 57
Discharge: HOME | End: 2017-05-24
Payer: MEDICARE

## 2017-05-24 DIAGNOSIS — E04.2 NONTOXIC MULTINODULAR GOITER: ICD-10-CM

## 2017-05-24 DIAGNOSIS — E55.9 UNSPECIFIED VITAMIN D DEFICIENCY: ICD-10-CM

## 2017-05-24 LAB
ALBUMIN SERPL BCP-MCNC: 3.3 G/DL (ref 3.4–5)
ALBUMIN/GLOB SERPL: 1.1 {RATIO} (ref 0.8–1.7)
ALP SERPL-CCNC: 69 U/L (ref 45–117)
ALT SERPL-CCNC: 20 U/L (ref 13–56)
ANION GAP BLD CALC-SCNC: 6 MMOL/L (ref 3–18)
AST SERPL W P-5'-P-CCNC: 11 U/L (ref 15–37)
BILIRUB SERPL-MCNC: 0.3 MG/DL (ref 0.2–1)
BUN SERPL-MCNC: 22 MG/DL (ref 7–18)
BUN/CREAT SERPL: 24 (ref 12–20)
CALCIUM SERPL-MCNC: 8.5 MG/DL (ref 8.5–10.1)
CALCIUM SERPL-MCNC: 8.9 MG/DL (ref 8.5–10.1)
CHLORIDE SERPL-SCNC: 101 MMOL/L (ref 100–108)
CO2 SERPL-SCNC: 29 MMOL/L (ref 21–32)
CREAT SERPL-MCNC: 0.9 MG/DL (ref 0.6–1.3)
GLOBULIN SER CALC-MCNC: 3.1 G/DL (ref 2–4)
GLUCOSE SERPL-MCNC: 114 MG/DL (ref 74–99)
MAGNESIUM SERPL-MCNC: 2.2 MG/DL (ref 1.6–2.6)
PHOSPHATE SERPL-MCNC: 3.8 MG/DL (ref 2.5–4.9)
POTASSIUM SERPL-SCNC: 4.6 MMOL/L (ref 3.5–5.5)
PROT SERPL-MCNC: 6.4 G/DL (ref 6.4–8.2)
PTH-INTACT SERPL-MCNC: 20.4 PG/ML (ref 14–72)
SODIUM SERPL-SCNC: 136 MMOL/L (ref 136–145)
T3FREE SERPL-MCNC: 2.2 PG/ML (ref 2.3–4.2)
T4 FREE SERPL-MCNC: 0.9 NG/DL (ref 0.7–1.5)
TSH SERPL DL<=0.05 MIU/L-ACNC: 3.55 UIU/ML (ref 0.36–3.74)

## 2017-05-24 PROCEDURE — 84100 ASSAY OF PHOSPHORUS: CPT | Performed by: INTERNAL MEDICINE

## 2017-05-24 PROCEDURE — 36415 COLL VENOUS BLD VENIPUNCTURE: CPT | Performed by: INTERNAL MEDICINE

## 2017-05-24 PROCEDURE — 84481 FREE ASSAY (FT-3): CPT | Performed by: INTERNAL MEDICINE

## 2017-05-24 PROCEDURE — 84443 ASSAY THYROID STIM HORMONE: CPT | Performed by: INTERNAL MEDICINE

## 2017-05-24 PROCEDURE — 83970 ASSAY OF PARATHORMONE: CPT | Performed by: INTERNAL MEDICINE

## 2017-05-24 PROCEDURE — 83735 ASSAY OF MAGNESIUM: CPT | Performed by: INTERNAL MEDICINE

## 2017-05-24 PROCEDURE — 80053 COMPREHEN METABOLIC PANEL: CPT | Performed by: INTERNAL MEDICINE

## 2017-05-24 PROCEDURE — 84439 ASSAY OF FREE THYROXINE: CPT | Performed by: INTERNAL MEDICINE

## 2017-07-20 ENCOUNTER — DOCUMENTATION ONLY (OUTPATIENT)
Dept: FAMILY MEDICINE CLINIC | Age: 57
End: 2017-07-20

## 2017-07-21 ENCOUNTER — TELEPHONE (OUTPATIENT)
Dept: CARDIOTHORACIC SURGERY | Age: 57
End: 2017-07-21

## 2017-07-26 ENCOUNTER — TELEPHONE (OUTPATIENT)
Dept: CARDIOTHORACIC SURGERY | Age: 57
End: 2017-07-26

## 2017-07-26 NOTE — TELEPHONE ENCOUNTER
Pt prev called our office asking if Dr. Angie Huizar would be able to prescribe two pain pills for her for her upcoming MRA scan. Pts states she would like to take on pill right before the scan and the other later that day. Pt states she experiences a lot of back and knee pain and it is uncomfortable for her to lay flat for that long. Relayed will ask  about her request and call her back. S/w Dr. Angie Huizar this yesterday re- above stated information. Dr. Angie Huizar informs he will not be able to prescribe pain medication for pt to use during or after ordered MRA scan. Called pt back to relay information stated above from Dr. Angie Huizar. LVM for pt to call our office.

## 2017-08-01 ENCOUNTER — HOSPITAL ENCOUNTER (OUTPATIENT)
Dept: MAMMOGRAPHY | Age: 57
Discharge: HOME | End: 2017-08-01
Attending: NURSE PRACTITIONER
Payer: MEDICARE

## 2017-08-01 ENCOUNTER — HOSPITAL ENCOUNTER (OUTPATIENT)
Dept: ULTRASOUND IMAGING | Age: 57
Discharge: HOME | End: 2017-08-01
Attending: NURSE PRACTITIONER
Payer: MEDICARE

## 2017-08-01 DIAGNOSIS — N63.0 BREAST LUMP OR MASS: ICD-10-CM

## 2017-08-01 DIAGNOSIS — Z09 FOLLOW-UP EXAM: ICD-10-CM

## 2017-08-01 PROCEDURE — 76642 ULTRASOUND BREAST LIMITED: CPT

## 2017-08-01 PROCEDURE — 77062 BREAST TOMOSYNTHESIS BI: CPT

## 2017-08-14 ENCOUNTER — TELEPHONE (OUTPATIENT)
Dept: CARDIOTHORACIC SURGERY | Age: 57
End: 2017-08-14

## 2017-08-17 ENCOUNTER — HOSPITAL ENCOUNTER (OUTPATIENT)
Dept: MRI IMAGING | Age: 57
Discharge: HOME | End: 2017-08-17
Attending: THORACIC SURGERY (CARDIOTHORACIC VASCULAR SURGERY)
Payer: MEDICARE

## 2017-08-17 DIAGNOSIS — I71.21 ASCENDING AORTIC ANEURYSM: ICD-10-CM

## 2017-08-17 PROCEDURE — 71555 MRI ANGIO CHEST W OR W/O DYE: CPT

## 2017-08-21 ENCOUNTER — TELEPHONE (OUTPATIENT)
Dept: CARDIOTHORACIC SURGERY | Age: 57
End: 2017-08-21

## 2017-08-21 NOTE — TELEPHONE ENCOUNTER
S/w regarding pt's scheduled appt for tomorrow: 08/21/17 @ 2:30. Pt is aware of date/time/location of appt.

## 2017-08-22 ENCOUNTER — OFFICE VISIT (OUTPATIENT)
Dept: CARDIOTHORACIC SURGERY | Age: 57
End: 2017-08-22

## 2017-08-22 VITALS
SYSTOLIC BLOOD PRESSURE: 133 MMHG | BODY MASS INDEX: 39.68 KG/M2 | HEIGHT: 72 IN | HEART RATE: 64 BPM | OXYGEN SATURATION: 96 % | WEIGHT: 293 LBS | DIASTOLIC BLOOD PRESSURE: 89 MMHG

## 2017-08-22 DIAGNOSIS — I71.21 ASCENDING AORTIC ANEURYSM: Primary | ICD-10-CM

## 2017-08-22 NOTE — PROGRESS NOTES
CARDIAC SURGERY FOLLOW-UP NOTE    8/22/2017  4:23 PM    Subjective:   Kerri Carranza returns for a follow-up visit  for ascending aortic aneurysm surveillance. She feels well. Chest pain is not present. She had a goiter removed several months ago and is recovering well from that though with some persistent swelling and hoarseness, both of which are mild. She is seeing her PCP and there are no significant changes in her medical history or health since our last visit other than as noted above. She has been keeping her blood pressure under good control. She has been compliant with taking her medications. Current medications include:  Current Outpatient Prescriptions   Medication Sig Dispense Refill    TURMERIC, BULK, Take 40 mg by mouth daily.  cinnamon bark, bulk, powd Take 40 mg by mouth daily.  traZODone (DESYREL) 150 mg tablet Take 1 Tab by mouth three (3) times daily. 90 Tab 0    LORazepam (ATIVAN) 1 mg tablet Take 1 Tab by mouth daily as needed for Anxiety (Severe). (Patient taking differently: Take 1 mg by mouth nightly.) 30 Tab 0    Cholecalciferol, Vitamin D3, 50,000 unit cap Take  by mouth Every Mon, Wed & Sun.      VITAMIN B COMPLEX PO Take 1 Cap by mouth daily.  OTHER Ground seaweed - 1 tsp. Daily.  MAGNESIUM PO Take 400 mg by mouth daily.  omega-3 fatty acids-vitamin e (FISH OIL) 1,000 mg Cap Take 1 Cap by mouth daily.  flaxseed oil 1,000 mg Cap Take 1,000 mg by mouth daily.  UBIDECARENONE/VITAMIN E MIXED (COQ10  PO) Take 1 Tab by mouth daily.  vitamin e (AQUA GEMS) 200 unit capsule Take 200 Units by mouth daily.  ascorbic acid (VITAMIN C) 250 mg tablet Take 250 mg by mouth daily.  bacitracin (BACITRACIN) ointment Apply  to affected area two (2) times a day.  (Patient taking differently: Apply  to affected area two (2) times daily as needed.) 30 g 0    ciprofloxacin HCl (CIPRO) 500 mg tablet Take 1 Tab by mouth every twelve (12) hours. (Patient taking differently: Take 500 mg by mouth every twelve (12) hours. Indications: takes if has bronchitis) 14 Tab 0    HYDROcodone-acetaminophen (NORCO) 7.5-325 mg per tablet Take 1 Tab by mouth every six (6) hours as needed. Max Daily Amount: 4 Tabs. 30 Tab 0    albuterol (VENTOLIN HFA) 90 mcg/actuation inhaler Take 2 Puffs by inhalation every four (4) hours as needed for Wheezing. 1 Inhaler 0    guaiFENesin-codeine (ROBITUSSIN AC) 100-10 mg/5 mL solution Take 10 mL by mouth four (4) times daily as needed for Cough. Max Daily Amount: 40 mL. 120 mL 0    EPINEPHrine (EPIPEN) 0.3 mg/0.3 mL (1:1,000) injection 0.3 mL by IntraMUSCular route once as needed for Anaphylaxis for up to 1 dose. Ok to dispense #2 if box contains 2 pens. 1 mL 0    tretinoin (RETIN-A) 0.05 % topical cream Apply to affected area at night time. (Patient taking differently: nightly as needed. Apply to affected area at night time.) 45 g 0    clotrimazole-betamethasone (LOTRISONE) topical cream Apply  to affected area two (2) times a day. (Patient taking differently: Apply  to affected area two (2) times daily as needed.) 45 g 2       Objective:   Vital signs:    BP Readings from Last 3 Encounters:   08/22/17 133/89   03/29/17 146/88   03/19/17 126/69     Wt Readings from Last 3 Encounters:   08/22/17 (!) 204.1 kg (450 lb)   03/29/17 (!) 186 kg (410 lb)   03/17/17 (!) 193.2 kg (426 lb)     @TMAX(24)@  Wt Readings from Last 3 Encounters:   08/22/17 (!) 204.1 kg (450 lb)   03/29/17 (!) 186 kg (410 lb)   03/17/17 (!) 193.2 kg (426 lb)     Ht Readings from Last 3 Encounters:   08/22/17 6' 1\" (1.854 m)   03/29/17 6' 1\" (1.854 m)   03/17/17 6' 1\" (1.854 m)     Respiratory exam: clear to auscultation bilaterally. Cardiac exam: regular rate and rhythm. SOft 2/6 systolic murmur over upper precordium. No carotid bruits. .    Chest MRI  measurements of the thoracic aorta done earlier this month demonstrate no appreciable change in size of the aneurysm. Assessment:  The size of her ascending aortic aneurysm does not mandate replacement at this time. The usual threshold for replacement is 5.5 cm, and indexed toher body surface area, there is an approximate annual risk of rupture of less than 4%. Her blood pressure is under good control, and we discussed the need to maintain it strictly. Recommendations:    1. Chest MRI in 24 months to check for growth. 2. Strict blood pressure control with goal of less than 130/80. 3. If experience severe sharp or tearing chest or back pain, be seen in an Emergency Room. 4. Do not push, lift or pull anything of extreme weight which causes bearing down or grunting. No power weight lifting. 5. Follow low salt, low fat diet. 6. Moderate physical activity is OK. 7. All first degree relatives should be screened with a chest CT scan.

## 2017-08-22 NOTE — PATIENT INSTRUCTIONS
Recommendations:    1. Chest MRI in 24 months to check for growth. If stable, yearly thereafter. 2. Strict blood pressure control with goal of less than 130/80. 3. If experience severe sharp or tearing chest or back pain, be seen in an Emergency Room. 4. Do not push, lift or pull anything of extreme weight which causes bearing down or grunting. No power weight lifting. 5. Follow low salt, low fat diet. 6. Moderate physical activity is OK. 7. All first degree relatives should be screened with a chest CT scan.

## 2017-08-22 NOTE — MR AVS SNAPSHOT
Visit Information Date & Time Provider Department Dept. Phone Encounter #  
 8/22/2017  2:30 PM Bethanie Szymanski MD [facility]ASC[facility]AC[facility] [place] [phone] [id number] Your Appointments 8/30/2017 11:00 AM  
Office Visit with FARIDA Mackey ([facility]) Appt Note: PAP SMEAR; mwv  
 [address]  
  
   
 [address]  
  
    
 9/27/2017 10:45 AM  
PHYSICAL with [facility] ([facility]) Appt Note: PAP AND PHYSICAL  
 [address]  
  
   
 [address] Upcoming Health Maintenance Date Due COLONOSCOPY 1/1/2015 MEDICARE YEARLY EXAM 8/10/2017 BREAST CANCER SCRN MAMMOGRAM 8/1/2019 PAP AKA CERVICAL CYTOLOGY 8/9/2019 DTaP/Tdap/Td series (2 - Td) 10/29/2020 Allergies as of 8/22/2017  Review Complete On: 8/22/2017 By: Bethanie Szymanski MD  
  
 Severity Noted Reaction Type Reactions Oxycodone High 10/09/2013    Anaphylaxis, Hives Adhesive Tape-silicones  64/45/3458    Rash Paper tape is okay to use. Celebrex [Celecoxib]  10/09/2013    Hives Contrast Dye [Iodine]  03/21/2016    Nausea Only Abdominal pain, dizziness Cortisone  08/15/2013    Nausea and Vomiting Flexeril [Cyclobenzaprine]  08/04/2013    Nausea and Vomiting Pt states also causes confusion Keflex [Cephalexin]  11/30/2016    Diarrhea, Nausea Only Joint pain Current Immunizations  Reviewed on 3/29/2017 Name Date Influenza Vaccine Split 10/24/2012, 10/17/2011, 10/29/2010 TDAP Vaccine 10/29/2010 Not reviewed this visit You Were Diagnosed With   
  
 Codes Comments Ascending aortic aneurysm (HCC)    -  Primary ICD-10-CM: I71.2 ICD-9-CM: 980. 2 Vitals BP Pulse Height(growth percentile) Weight(growth percentile) LMP SpO2  
 133/89 (BP 1 Location: Other(comment), BP Patient Position: Sitting) 64 6' 1\" (1.854 m) (!) 450 lb (204.1 kg) 07/16/2017 96% BMI OB Status Smoking Status 59.37 kg/m2 Having regular periods Never Smoker Vitals History BMI and BSA Data Body Mass Index Body Surface Area  
 59.37 kg/m 2 3.24 m 2 Preferred Pharmacy Pharmacy Name Phone [id number], [address] [place], [place] [phone] Your Updated Medication List  
  
   
This list is accurate as of: 8/22/17 11:59 PM.  Always use your most recent med list.  
  
  
  
  
 albuterol 90 mcg/actuation inhaler Commonly known as:  VENTOLIN HFA Take 2 Puffs by inhalation every four (4) hours as needed for Wheezing. bacitracin zinc ointment Commonly known as:  BACITRACIN Apply  to affected area two (2) times a day. Cholecalciferol (Vitamin D3) 50,000 unit Cap Take  by mouth Every Mon, Wed & Sun.  
  
 cinnamon bark (bulk) Powd Take 40 mg by mouth daily. ciprofloxacin HCl 500 mg tablet Commonly known as:  CIPRO Take 1 Tab by mouth every twelve (12) hours. clotrimazole-betamethasone topical cream  
Commonly known as:  Karel Hein Apply  to affected area two (2) times a day. COQ10  PO Take 1 Tab by mouth daily. EPINEPHrine 0.3 mg/0.3 mL injection Commonly known as:  EPIPEN  
0.3 mL by IntraMUSCular route once as needed for Anaphylaxis for up to 1 dose. Ok to dispense #2 if box contains 2 pens. FISH OIL 1,000 mg Cap Generic drug:  omega-3 fatty acids-vitamin e Take 1 Cap by mouth daily. flaxseed oil 1,000 mg Cap Take 1,000 mg by mouth daily. guaiFENesin-codeine 100-10 mg/5 mL solution Commonly known as:  ROBITUSSIN AC Take 10 mL by mouth four (4) times daily as needed for Cough. Max Daily Amount: 40 mL. HYDROcodone-acetaminophen 7.5-325 mg per tablet Commonly known as:  Marylu Plum Take 1 Tab by mouth every six (6) hours as needed. Max Daily Amount: 4 Tabs. LORazepam 1 mg tablet Commonly known as:  ATIVAN Take 1 Tab by mouth daily as needed for Anxiety (Severe). MAGNESIUM PO Take 400 mg by mouth daily. OTHER Ground seaweed - 1 tsp. Daily. traZODone 150 mg tablet Commonly known as:  Noreene Annabel Take 1 Tab by mouth three (3) times daily. tretinoin 0.05 % topical cream  
Commonly known as:  RETIN-A Apply to affected area at night time. TURMERIC (BULK) Take 40 mg by mouth daily. VITAMIN B COMPLEX PO Take 1 Cap by mouth daily. VITAMIN C 250 mg tablet Generic drug:  ascorbic acid (vitamin C) Take 250 mg by mouth daily. vitamin e 200 unit capsule Commonly known as:  [address] Take 200 Units by mouth daily. To-Do List   
 08/22/2019 Imaging:  MRI CHEST WO CONT Patient Instructions Recommendations: 1. Chest MRI in 24 months to check for growth. If stable, yearly thereafter. 2. Strict blood pressure control with goal of less than 130/80. 3. If experience severe sharp or tearing chest or back pain, be seen in an Emergency Room. 4. Do not push, lift or pull anything of extreme weight which causes bearing down or grunting. No power weight lifting. 5. Follow low salt, low fat diet. 6. Moderate physical activity is OK. 7. All first degree relatives should be screened with a chest CT scan. Introducing [facility] & HEALTH SERVICES! Dear Nova No: Thank you for requesting a Zift[employer] account. Our records indicate that you already have an active Ziftit account. You can access your account anytime at [email or web address] [facility]/MediSens Did you know that you can access your hospital and ER discharge instructions at any time in Ziftit?   You can also review all of your test results from your hospital stay or ER visit. Additional Information If you have questions, please visit the Frequently Asked Questions section of the [email or web address] website at [email or web address] Remember, [email or web address] is NOT to be used for urgent needs. For medical emergencies, dial 911. Now available from your iPhone and Android! Please provide this summary of care documentation to your next provider. Your primary care clinician is listed as Marleen Dubin. If you have any questions after today's visit, please call [phone].

## 2017-08-22 NOTE — LETTER
2017 Patient:  Adam Rodriguez MRN:     [id number] :     1960 Dear Dr. León Joseph: 
 
 I had the pleasure of seeing Adam Rodriguez in clinic today at [facility] in follow-up for her ascending aortic aneurysm. Her most recent MRI demonstrated no change in the size of his ascending aortic aneurysm. We will repeat the study in 2 years. She should continue to maintain strict blood pressure control. Thanks very much for involving me in his care, and if you have any questions about his case, please feel free to contact me. Best regards, Kvng Ying MD 
Medical Director, Cardiovascular and Thoracic Services [facility] [place] [facility]UN[facility]

## 2017-08-29 ENCOUNTER — TELEPHONE (OUTPATIENT)
Dept: CARDIOTHORACIC SURGERY | Age: 57
End: 2017-08-29

## 2017-08-29 NOTE — TELEPHONE ENCOUNTER
Patient actually called in on : Monday 08/28/17 @ 1:28 PM to request that we mail her out the AVS-visit summary from her last visit. She was frustrated that she could not find it on her MyChart. I also let her know at that time that per Dr. Angie Huizar stated that it was not necessary for the patient to come back in for her two year follow up from her MRA. He could call her. I let her know I had cancelled the future appointment that I had set up with her. She aware of this and that I  Would be contacting her in the future to help get her MRA without contrast set up.

## 2017-08-30 ENCOUNTER — OFFICE VISIT (OUTPATIENT)
Dept: FAMILY MEDICINE CLINIC | Age: 57
End: 2017-08-30

## 2017-08-30 VITALS
OXYGEN SATURATION: 93 % | WEIGHT: 293 LBS | SYSTOLIC BLOOD PRESSURE: 144 MMHG | HEART RATE: 66 BPM | RESPIRATION RATE: 16 BRPM | HEIGHT: 72 IN | DIASTOLIC BLOOD PRESSURE: 90 MMHG | BODY MASS INDEX: 39.68 KG/M2 | TEMPERATURE: 97.3 F

## 2017-08-30 DIAGNOSIS — Z78.0 POST-MENOPAUSAL: ICD-10-CM

## 2017-08-30 DIAGNOSIS — Z00.00 MEDICARE ANNUAL WELLNESS VISIT, SUBSEQUENT: Primary | ICD-10-CM

## 2017-08-30 NOTE — PATIENT INSTRUCTIONS
Medicare Wellness Visit, Female    The best way to live healthy is to have a healthy lifestyle by eating a well-balanced diet, exercising regularly, limiting alcohol and stopping smoking. Regular physical exams and screening tests are another way to keep healthy. Preventive exams provided by your health care provider can find health problems before they become diseases or illnesses. Preventive services including immunizations, screening tests, monitoring and exams can help you take care of your own health. All people over age 72 should have a pneumovax  and and a prevnar shot to prevent pneumonia. These are once in a lifetime unless you and your provider decide differently. All people over 65 should have a yearly flu shot and a tetanus vaccine every 10 years. A bone mass density to screen for osteoporosis or thinning of the bones should be done every 2 years after 65. Screening for diabetes mellitus with a blood sugar test should be done every year. Glaucoma is a disease of the eye due to increased ocular pressure that can lead to blindness and it should be done every year by an eye professional.    Cardiovascular screening tests that check for elevated lipids (fatty part of blood) which can lead to heart disease and strokes should be done every 5 years. Colorectal screening that evaluates for blood or polyps in your colon should be done yearly as a stool test or every five years as a flexible sigmoidoscope or every 10 years as a colonoscopy up to age 76. Breast cancer screening with a mammogram is recommended biennially  for women age 54-69. Screening for cervical cancer with a pap smear and pelvic exam is recommended for women after age 72 years every 2 years up to age 79 or when the provider and patient decide to stop. If there is a history of cervical abnormalities or other increased risk for cancer then the test is recommended yearly.     Hepatitis C screening is also recommended for anyone born between [birth year] through [address]. A shingles vaccine is also recommended once in a lifetime after age 61. Your Medicare Wellness Exam is recommended annually. Here is a list of your current Health Maintenance items with a due date:  Health Maintenance Due   Topic Date Due    Colonoscopy  01/01/2015    Annual Well Visit  08/10/2017            Osteoporosis: Care Instructions  Your Care Instructions    Osteoporosis causes bones to become thin and weak. It is much more common in women than in men. Osteoporosis may be very advanced before you know you have it. Sometimes the first sign is a broken bone in the hip, spine, or wrist or sudden pain in your middle or lower back. Follow-up care is a key part of your treatment and safety. Be sure to make and go to all appointments, and call your doctor if you are having problems. It's also a good idea to know your test results and keep a list of the medicines you take. How can you care for yourself at home? · Your doctor may prescribe a bisphosphonate, such as risedronate (Actonel) or alendronate (Fosamax), for osteoporosis. If you are taking one of these medicines by mouth:  ¨ Take your medicine with a full glass of water when you first get up in the morning. ¨ Do not lie down, eat, drink a beverage, or take any other medicine for at least 30 minutes after taking the drug. This helps prevent stomach problems. ¨ Do not take your medicine late in the day if you forgot to take it in the morning. Skip it, and take the usual dose the next morning. ¨ If you have side effects, tell your doctor. He or she may prescribe another medicine. · Get enough calcium and vitamin D. The [employer] [employer] recommends adults younger than age 46 need 1,000 mg of calcium and 600 IU of vitamin D each day. Women ages 46 to 79 need 1,200 mg of calcium and 600 IU of vitamin D each day. Men ages 46 to 79 need 1,000 mg of calcium and 600 IU of vitamin D each day.  Adults 71 and older need 1,200 mg of calcium and 800 IU of vitamin D each day. ¨ Eat foods rich in calcium, like yogurt, cheese, milk, and dark green vegetables. This is a good way to get the calcium you need. You can get vitamin D from eggs, fatty fish, cereal, and milk. ¨ Talk to your doctor about taking a calcium plus vitamin D supplement. Be careful, though. Adults ages 23 to 48 should not get more than 2,500 mg of calcium and 4,000 IU of vitamin D each day, whether it is from supplements and/or food. Adults ages 46 and older should not get more than 2,000 mg of calcium and 4,000 IU of vitamin D each day from supplements and/or food. · Limit alcohol to 2 drinks a day for men and 1 drink a day for women. Too much alcohol can cause health problems. · Do not smoke. Smoking puts you at a much higher risk for osteoporosis. If you need help quitting, talk to your doctor about stop-smoking programs and medicines. These can increase your chances of quitting for good. · Get regular bone-building exercise. Weight-bearing and resistance exercises keep bones healthy by working the muscles and bones against gravity. Start out at an exercise level that feels right for you. Add a little at a time until you can do the following:  ¨ Do 30 minutes of weight-bearing exercise on most days of the week. Walking, jogging, stair climbing, and dancing are good choices. ¨ Do resistance exercises with weights or elastic bands 2 to 3 days a week. · Reduce your risk of falls:  ¨ Wear supportive shoes with low heels and nonslip soles. ¨ Use a cane or walker, if you need it. Use shower chairs and bath benches. Put in handrails on stairways, around your shower or tub area, and near the toilet. ¨ Keep stairs, porches, and walkways well lit. Use night-lights. ¨ Remove throw rugs and other objects that are in the way. ¨ Avoid icy, wet, or slippery surfaces. ¨ Keep a cordless phone and a flashlight with new batteries by your bed.   When should you call for help? Watch closely for changes in your health, and be sure to contact your doctor if you have any problems. Where can you learn more? Go to http://flora-sarkis.info/. Enter K100 in the search box to learn more about \"Osteoporosis: Care Instructions. \"  Current as of: August 9, 2016  Content Version: 11.3  © 0379-9956 [employer]. Care instructions adapted under license by [employer] (which disclaims liability or warranty for this information). If you have questions about a medical condition or this instruction, always ask your healthcare professional. [place] [address] any warranty or liability for your use of this information. Dual-Energy X-Ray Absorptiometry (DXA) Test: About This Test  What is it? Dual-energy X-ray absorptiometry (DXA) is a test that uses two different X-ray beams to check bone thickness (density) in your spine and hip. This information is used to estimate the strength of your bones. Why is this test done? A DXA test is done to check for bone thinning and weakness, which can make it easier for you to break a bone. It is often done for:  · People who are at risk for osteoporosis, including:  ¨ Women who are age 72 and older. ¨ Older men. ¨ People who take some medications, such as corticosteroids. ¨ People who have certain medical conditions, such as hyperparathyroidism. · People who have osteoporosis, to see how well treatment is working. What happens before the test?  · Women who are pregnant should not have this test. Let your doctor know if you are or might be pregnant. · You may be able to leave your clothes on for the test, but you will remove any metal buttons or stanislav for the test.  What happens during the test?  · You will lie down on your back on a padded table. · You may need to lie with your legs straight or with your lower legs resting on a platform built into the table.   · The machine will scan your bones and measure the amount of radiation they absorb. During this test you are exposed to a very low dose of radiation. How long does the test take? · The test will take about 20 minutes. What happens after the test?  · You will probably be able to go home right away. · You can go back to your usual activities right away. Follow-up care is a key part of your treatment and safety. Be sure to make and go to all appointments, and call your doctor if you are having problems. It's also a good idea to keep a list of the medicines you take. Ask your doctor when you can expect to have your test results. Where can you learn more? Go to http://flora-sarkis.info/. Enter D607 in the search box to learn more about \"Dual-Energy X-Ray Absorptiometry (DXA) Test: About This Test.\"  Current as of: August 4, 2016  Content Version: 11.3  © 3054-7346 [employer]. Care instructions adapted under license by [employer] (which disclaims liability or warranty for this information). If you have questions about a medical condition or this instruction, always ask your healthcare professional. [place] [address] any warranty or liability for your use of this information. Dual-Energy X-Ray Absorptiometry (DXA) Test: About This Test  What is it? Dual-energy X-ray absorptiometry (DXA) is a test that uses two different X-ray beams to check bone thickness (density) in your spine and hip. This information is used to estimate the strength of your bones. Why is this test done? A DXA test is done to check for bone thinning and weakness, which can make it easier for you to break a bone. It is often done for:  · People who are at risk for osteoporosis, including:  ¨ Women who are age 72 and older. ¨ Older men. ¨ People who take some medications, such as corticosteroids. ¨ People who have certain medical conditions, such as hyperparathyroidism.   · People who have osteoporosis, to see how well treatment is working. What happens before the test?  · Women who are pregnant should not have this test. Let your doctor know if you are or might be pregnant. · You may be able to leave your clothes on for the test, but you will remove any metal buttons or stanislav for the test.  What happens during the test?  · You will lie down on your back on a padded table. · You may need to lie with your legs straight or with your lower legs resting on a platform built into the table. · The machine will scan your bones and measure the amount of radiation they absorb. During this test you are exposed to a very low dose of radiation. How long does the test take? · The test will take about 20 minutes. What happens after the test?  · You will probably be able to go home right away. · You can go back to your usual activities right away. Follow-up care is a key part of your treatment and safety. Be sure to make and go to all appointments, and call your doctor if you are having problems. It's also a good idea to keep a list of the medicines you take. Ask your doctor when you can expect to have your test results. Where can you learn more? Go to http://flora-sarkis.info/. Enter N372 in the search box to learn more about \"Dual-Energy X-Ray Absorptiometry (DXA) Test: About This Test.\"  Current as of: August 4, 2016  Content Version: 11.3  © 9846-7254 [employer]. Care instructions adapted under license by [employer] (which disclaims liability or warranty for this information). If you have questions about a medical condition or this instruction, always ask your healthcare professional. [address] any warranty or liability for your use of this information. Learning About Colonoscopy  What is a colonoscopy? A colonoscopy is a test (also called a procedure) that lets a doctor look inside your large intestine.  The doctor uses a thin, lighted tube called a colonoscope. The doctor uses it to look for small growths called polyps, colon or rectal cancer (colorectal cancer), or other problems like bleeding. During the procedure, the doctor can take samples of tissue. The samples can then be checked for cancer or other conditions. The doctor can also take out polyps. How is colonoscopy done? This procedure is done in a doctor's office or a clinic or hospital. You will get medicine to help you relax and not feel pain. Some people find that they do not remember having the test because of the medicine. The doctor gently moves the colonoscope, or scope, through the colon. The scope is also a small video camera. It lets the doctor see the colon and take pictures. A colonoscopy usually takes 30 to 45 minutes. It may take longer if the doctor has to remove polyps. How do you prepare for the procedure? You need to clean out your colon before the procedure so the doctor can see all of your colon. You may start the cleaning process a day or two before the test. This depends on which \"colon prep\" your doctor recommends. To clean your colon, you stop eating solid foods and drink only clear liquids. You can have water, tea, coffee, clear juices, clear broths, flavored ice pops, and gelatin (such as Jell-O). Do not drink anything red or purple, such as grape juice or fruit punch. And do not eat red or purple foods, such as grape ice pops or cherry gelatin. The day or night before the procedure, you drink a large amount of a special liquid. This causes loose, frequent stools. You will go to the bathroom a lot. It is very important to drink all of the colon prep liquid. If you have problems drinking the liquid, call your doctor. For many people, the prep is worse than the test. It may be uncomfortable, and you may feel hungry on the clear liquid diet. Some people do not go to work or do their usual activities on the day of the prep.   Arrange to have someone take you home after the test.  What can you expect after a colonoscopy? The nurses will watch you for 1 to 2 hours until the medicines wear off. Then you can go home. You will need a ride. Your doctor will tell you when you can eat and do your usual activities. Your doctor will talk to you about when you will need your next colonoscopy. The results of your test and your risk for colorectal cancer will help your doctor decide how often you need to be checked. Follow-up care is a key part of your treatment and safety. Be sure to make and go to all appointments, and call your doctor if you are having problems. It's also a good idea to know your test results and keep a list of the medicines you take. Where can you learn more? Go to http://flora-sarkis.info/. Enter L700 in the search box to learn more about \"Learning About Colonoscopy. \"  Current as of: July 26, 2016  Content Version: 11.3  © 9964-2589 [employer]. Care instructions adapted under license by [employer] (which disclaims liability or warranty for this information). If you have questions about a medical condition or this instruction, always ask your healthcare professional. [address] any warranty or liability for your use of this information. Learning About Colonoscopy  What is a colonoscopy? A colonoscopy is a test (also called a procedure) that lets a doctor look inside your large intestine. The doctor uses a thin, lighted tube called a colonoscope. The doctor uses it to look for small growths called polyps, colon or rectal cancer (colorectal cancer), or other problems like bleeding. During the procedure, the doctor can take samples of tissue. The samples can then be checked for cancer or other conditions. The doctor can also take out polyps. How is colonoscopy done?   This procedure is done in a doctor's office or a clinic or hospital. You will get medicine to help you relax and not feel pain. Some people find that they do not remember having the test because of the medicine. The doctor gently moves the colonoscope, or scope, through the colon. The scope is also a small video camera. It lets the doctor see the colon and take pictures. A colonoscopy usually takes 30 to 45 minutes. It may take longer if the doctor has to remove polyps. How do you prepare for the procedure? You need to clean out your colon before the procedure so the doctor can see all of your colon. You may start the cleaning process a day or two before the test. This depends on which \"colon prep\" your doctor recommends. To clean your colon, you stop eating solid foods and drink only clear liquids. You can have water, tea, coffee, clear juices, clear broths, flavored ice pops, and gelatin (such as Jell-O). Do not drink anything red or purple, such as grape juice or fruit punch. And do not eat red or purple foods, such as grape ice pops or cherry gelatin. The day or night before the procedure, you drink a large amount of a special liquid. This causes loose, frequent stools. You will go to the bathroom a lot. It is very important to drink all of the colon prep liquid. If you have problems drinking the liquid, call your doctor. For many people, the prep is worse than the test. It may be uncomfortable, and you may feel hungry on the clear liquid diet. Some people do not go to work or do their usual activities on the day of the prep. Arrange to have someone take you home after the test.  What can you expect after a colonoscopy? The nurses will watch you for 1 to 2 hours until the medicines wear off. Then you can go home. You will need a ride. Your doctor will tell you when you can eat and do your usual activities. Your doctor will talk to you about when you will need your next colonoscopy.  The results of your test and your risk for colorectal cancer will help your doctor decide how often you need to be checked. Follow-up care is a key part of your treatment and safety. Be sure to make and go to all appointments, and call your doctor if you are having problems. It's also a good idea to know your test results and keep a list of the medicines you take. Where can you learn more? Go to http://flora-sarkis.info/. Enter Z654 in the search box to learn more about \"Learning About Colonoscopy. \"  Current as of: July 26, 2016  Content Version: 11.3  © 5340-8437 [employer]. Care instructions adapted under license by [employer] (which disclaims liability or warranty for this information). If you have questions about a medical condition or this instruction, always ask your healthcare professional. [address] any warranty or liability for your use of this information.

## 2017-08-30 NOTE — MR AVS SNAPSHOT
Visit Information Date & Time Provider Department Dept. Phone Encounter #  
 8/30/2017 11:00 AM Galileo EliasPanda [id number] Follow-up Instructions Return in about 1 year (around 8/30/2018), or if symptoms worsen or fail to improve. Your Appointments 9/27/2017 10:45 AM  
PHYSICAL with Galileo Elias NP  
Sonia Domingo ([address]) Appt Note: PAP AND PHYSICAL  
 [address]  
  
   
 [address] Upcoming Health Maintenance Date Due COLONOSCOPY 1/1/2015 MEDICARE YEARLY EXAM 8/10/2017 BREAST CANCER SCRN MAMMOGRAM 8/1/2019 PAP AKA CERVICAL CYTOLOGY 8/9/2019 DTaP/Tdap/Td series (2 - Td) 10/29/2020 Allergies as of 8/30/2017  Review Complete On: 8/30/2017 By: Harish Garcia LPN Severity Noted Reaction Type Reactions Oxycodone High 10/09/2013    Anaphylaxis, Hives Adhesive Tape-silicones  13/95/7847    Rash Paper tape is okay to use. Celebrex [Celecoxib]  10/09/2013    Hives Contrast Dye [Iodine]  03/21/2016    Nausea Only Abdominal pain, dizziness Cortisone  08/15/2013    Nausea and Vomiting Flexeril [Cyclobenzaprine]  08/04/2013    Nausea and Vomiting Pt states also causes confusion Keflex [Cephalexin]  11/30/2016    Diarrhea, Nausea Only Joint pain Current Immunizations  Reviewed on 3/29/2017 Name Date Influenza Vaccine Split 10/24/2012, 10/17/2011, 10/29/2010 TDAP Vaccine 10/29/2010 Not reviewed this visit You Were Diagnosed With   
  
 Codes Comments Medicare annual wellness visit, subsequent    -  Primary ICD-10-CM: Z00.00 ICD-9-CM: V70.0 Post-menopausal     ICD-10-CM: Z78.0 ICD-9-CM: V49.81 Vitals BP Pulse Temp Resp Height(growth percentile) Weight(growth percentile) 144/90 66 97.3 °F (36.3 °C) (Oral) 16 6' 1\" (1.854 m) (!) 419 lb (190.1 kg) LMP SpO2 BMI OB Status Smoking Status 07/16/2017 93% 55.28 kg/m2 Having regular periods Never Smoker BMI and BSA Data Body Mass Index Body Surface Area  
 55.28 kg/m 2 3.13 m 2 Preferred Pharmacy Pharmacy Name Phone [facility], [address] [place] [address] [phone] Your Updated Medication List  
  
   
This list is accurate as of: 8/30/17 12:06 PM.  Always use your most recent med list.  
  
  
  
  
 Cholecalciferol (Vitamin D3) 50,000 unit Cap Take  by mouth Every Mon, Wed & Sun.  
  
 cinnamon bark (bulk) Powd Take 40 mg by mouth daily. clotrimazole-betamethasone topical cream  
Commonly known as:  Radha Juana Apply  to affected area two (2) times a day. COQ10  PO Take 1 Tab by mouth daily. EPINEPHrine 0.3 mg/0.3 mL injection Commonly known as:  EPIPEN  
0.3 mL by IntraMUSCular route once as needed for Anaphylaxis for up to 1 dose. Ok to dispense #2 if box contains 2 pens. FISH OIL 1,000 mg Cap Generic drug:  omega-3 fatty acids-vitamin e Take 1 Cap by mouth daily. flaxseed oil 1,000 mg Cap Take 1,000 mg by mouth daily. HYDROcodone-acetaminophen 7.5-325 mg per tablet Commonly known as:  Venecia Dimes Take 1 Tab by mouth every six (6) hours as needed. Max Daily Amount: 4 Tabs. LORazepam 1 mg tablet Commonly known as:  ATIVAN Take 1 Tab by mouth daily as needed for Anxiety (Severe). MAGNESIUM PO Take 400 mg by mouth daily. OTHER Ground seaweed - 1 tsp. Daily. traZODone 150 mg tablet Commonly known as:  Aliya Runner Take 1 Tab by mouth three (3) times daily. tretinoin 0.05 % topical cream  
Commonly known as:  RETIN-A Apply to affected area at night time. TURMERIC (BULK) Take 40 mg by mouth daily. VITAMIN B COMPLEX PO Take 1 Cap by mouth daily. VITAMIN C 250 mg tablet Generic drug:  ascorbic acid (vitamin C) Take 250 mg by mouth daily. vitamin e 200 unit capsule Commonly known as:  [address] Take 200 Units by mouth daily. We Performed the Following REFERRAL FOR COLONOSCOPY [JVR852 Custom] Follow-up Instructions Return in about 1 year (around 8/30/2018), or if symptoms worsen or fail to improve. To-Do List   
 09/02/2017 Imaging:  DEXA BONE DENSITY STUDY AXIAL Referral Information Referral ID Referred By Referred To  
  
 [id number] Maryam Calvin Not Available Visits Status Start Date End Date 1 New Request 8/30/17 8/30/18 If your referral has a status of pending review or denied, additional information will be sent to support the outcome of this decision. Patient Instructions Medicare Wellness Visit, Female The best way to live healthy is to have a healthy lifestyle by eating a well-balanced diet, exercising regularly, limiting alcohol and stopping smoking. Regular physical exams and screening tests are another way to keep healthy. Preventive exams provided by your health care provider can find health problems before they become diseases or illnesses. Preventive services including immunizations, screening tests, monitoring and exams can help you take care of your own health. All people over age 72 should have a pneumovax  and and a prevnar shot to prevent pneumonia. These are once in a lifetime unless you and your provider decide differently. All people over 65 should have a yearly flu shot and a tetanus vaccine every 10 years. A bone mass density to screen for osteoporosis or thinning of the bones should be done every 2 years after 65. Screening for diabetes mellitus with a blood sugar test should be done every year.  
 
Glaucoma is a disease of the eye due to increased ocular pressure that can lead to blindness and it should be done every year by an eye professional. 
 
 Cardiovascular screening tests that check for elevated lipids (fatty part of blood) which can lead to heart disease and strokes should be done every 5 years. Colorectal screening that evaluates for blood or polyps in your colon should be done yearly as a stool test or every five years as a flexible sigmoidoscope or every 10 years as a colonoscopy up to age 76. Breast cancer screening with a mammogram is recommended biennially  for women age 54-69. Screening for cervical cancer with a pap smear and pelvic exam is recommended for women after age 72 years every 2 years up to age 79 or when the provider and patient decide to stop. If there is a history of cervical abnormalities or other increased risk for cancer then the test is recommended yearly. Hepatitis C screening is also recommended for anyone born between [birth year] through [address]. A shingles vaccine is also recommended once in a lifetime after age 61. Your Medicare Wellness Exam is recommended annually. Here is a list of your current Health Maintenance items with a due date: 
Health Maintenance Due Topic Date Due  
 Colonoscopy  01/01/2015 [facility] Annual Well Visit  08/10/2017 Osteoporosis: Care Instructions Your Care Instructions Osteoporosis causes bones to become thin and weak. It is much more common in women than in men. Osteoporosis may be very advanced before you know you have it. Sometimes the first sign is a broken bone in the hip, spine, or wrist or sudden pain in your middle or lower back. Follow-up care is a key part of your treatment and safety. Be sure to make and go to all appointments, and call your doctor if you are having problems. It's also a good idea to know your test results and keep a list of the medicines you take. How can you care for yourself at home? · Your doctor may prescribe a bisphosphonate, such as risedronate (Actonel) or alendronate (Fosamax), for osteoporosis.  If you are taking one of these medicines by mouth: 
¨ Take your medicine with a full glass of water when you first get up in the morning. ¨ Do not lie down, eat, drink a beverage, or take any other medicine for at least 30 minutes after taking the drug. This helps prevent stomach problems. ¨ Do not take your medicine late in the day if you forgot to take it in the morning. Skip it, and take the usual dose the next morning. ¨ If you have side effects, tell your doctor. He or she may prescribe another medicine. · Get enough calcium and vitamin D. The [place] of Medicine recommends adults younger than age 46 need 1,000 mg of calcium and 600 IU of vitamin D each day. Women ages 46 to 79 need 1,200 mg of calcium and 600 IU of vitamin D each day. Men ages 46 to 79 need 1,000 mg of calcium and 600 IU of vitamin D each day. Adults 71 and older need 1,200 mg of calcium and 800 IU of vitamin D each day. ¨ Eat foods rich in calcium, like yogurt, cheese, milk, and dark green vegetables. This is a good way to get the calcium you need. You can get vitamin D from eggs, fatty fish, cereal, and milk. ¨ Talk to your doctor about taking a calcium plus vitamin D supplement. Be careful, though. Adults ages 23 to 48 should not get more than 2,500 mg of calcium and 4,000 IU of vitamin D each day, whether it is from supplements and/or food. Adults ages 46 and older should not get more than 2,000 mg of calcium and 4,000 IU of vitamin D each day from supplements and/or food. · Limit alcohol to 2 drinks a day for men and 1 drink a day for women. Too much alcohol can cause health problems. · Do not smoke. Smoking puts you at a much higher risk for osteoporosis. If you need help quitting, talk to your doctor about stop-smoking programs and medicines. These can increase your chances of quitting for good. · Get regular bone-building exercise.  Weight-bearing and resistance exercises keep bones healthy by working the muscles and bones against gravity. Start out at an exercise level that feels right for you. Add a little at a time until you can do the following: ¨ Do 30 minutes of weight-bearing exercise on most days of the week. Walking, jogging, stair climbing, and dancing are good choices. ¨ Do resistance exercises with weights or elastic bands 2 to 3 days a week. · Reduce your risk of falls: 
¨ Wear supportive shoes with low heels and nonslip soles. ¨ Use a cane or walker, if you need it. Use shower chairs and bath benches. Put in handrails on stairways, around your shower or tub area, and near the toilet. ¨ Keep stairs, porches, and walkways well lit. Use night-lights. ¨ Remove throw rugs and other objects that are in the way. ¨ Avoid icy, wet, or slippery surfaces. ¨ Keep a cordless phone and a flashlight with new batteries by your bed. When should you call for help? Watch closely for changes in your health, and be sure to contact your doctor if you have any problems. Where can you learn more? Go to http://floraMineloader Software Co. Ltdsarkis.info/. Enter K100 in the search box to learn more about \"Osteoporosis: Care Instructions. \" Current as of: August 9, 2016 Content Version: 11.3 © 0481-3286 Infectious. Care instructions adapted under license by [employer] (which disclaims liability or warranty for this information). If you have questions about a medical condition or this instruction, always ask your healthcare professional. [place] [address] any warranty or liability for your use of this information. Dual-Energy X-Ray Absorptiometry (DXA) Test: About This Test 
What is it? Dual-energy X-ray absorptiometry (DXA) is a test that uses two different X-ray beams to check bone thickness (density) in your spine and hip. This information is used to estimate the strength of your bones. Why is this test done?  
A DXA test is done to check for bone thinning and weakness, which can make it easier for you to break a bone. It is often done for: 
· People who are at risk for osteoporosis, including: ¨ Women who are age 72 and older. ¨ Older men. ¨ People who take some medications, such as corticosteroids. ¨ People who have certain medical conditions, such as hyperparathyroidism. · People who have osteoporosis, to see how well treatment is working. What happens before the test? 
· Women who are pregnant should not have this test. Let your doctor know if you are or might be pregnant. · You may be able to leave your clothes on for the test, but you will remove any metal buttons or stanislav for the test. 
What happens during the test? 
· You will lie down on your back on a padded table. · You may need to lie with your legs straight or with your lower legs resting on a platform built into the table. · The machine will scan your bones and measure the amount of radiation they absorb. During this test you are exposed to a very low dose of radiation. How long does the test take? · The test will take about 20 minutes. What happens after the test? 
· You will probably be able to go home right away. · You can go back to your usual activities right away. Follow-up care is a key part of your treatment and safety. Be sure to make and go to all appointments, and call your doctor if you are having problems. It's also a good idea to keep a list of the medicines you take. Ask your doctor when you can expect to have your test results. Where can you learn more? Go to http://flora-sarkis.info/. Enter N049 in the search box to learn more about \"Dual-Energy X-Ray Absorptiometry (DXA) Test: About This Test.\" Current as of: August 4, 2016 Content Version: 11.3 © 8083-1606 [employer]. Care instructions adapted under license by [employer] (which disclaims liability or warranty for this information).  If you have questions about a medical condition or this instruction, always ask your healthcare professional. [place] [address] any warranty or liability for your use of this information. Dual-Energy X-Ray Absorptiometry (DXA) Test: About This Test 
What is it? Dual-energy X-ray absorptiometry (DXA) is a test that uses two different X-ray beams to check bone thickness (density) in your spine and hip. This information is used to estimate the strength of your bones. Why is this test done? A DXA test is done to check for bone thinning and weakness, which can make it easier for you to break a bone. It is often done for: 
· People who are at risk for osteoporosis, including: ¨ Women who are age 72 and older. ¨ Older men. ¨ People who take some medications, such as corticosteroids. ¨ People who have certain medical conditions, such as hyperparathyroidism. · People who have osteoporosis, to see how well treatment is working. What happens before the test? 
· Women who are pregnant should not have this test. Let your doctor know if you are or might be pregnant. · You may be able to leave your clothes on for the test, but you will remove any metal buttons or stanislav for the test. 
What happens during the test? 
· You will lie down on your back on a padded table. · You may need to lie with your legs straight or with your lower legs resting on a platform built into the table. · The machine will scan your bones and measure the amount of radiation they absorb. During this test you are exposed to a very low dose of radiation. How long does the test take? · The test will take about 20 minutes. What happens after the test? 
· You will probably be able to go home right away. · You can go back to your usual activities right away. Follow-up care is a key part of your treatment and safety. Be sure to make and go to all appointments, and call your doctor if you are having problems. It's also a good idea to keep a list of the medicines you take. Ask your doctor when you can expect to have your test results. Where can you learn more? Go to http://flora-sarkis.info/. Enter S751 in the search box to learn more about \"Dual-Energy X-Ray Absorptiometry (DXA) Test: About This Test.\" Current as of: August 4, 2016 Content Version: 11.3 © 6170-4325 [employer]. Care instructions adapted under license by [employer] (which disclaims liability or warranty for this information). If you have questions about a medical condition or this instruction, always ask your healthcare professional. [place] [address] any warranty or liability for your use of this information. Learning About Colonoscopy What is a colonoscopy? A colonoscopy is a test (also called a procedure) that lets a doctor look inside your large intestine. The doctor uses a thin, lighted tube called a colonoscope. The doctor uses it to look for small growths called polyps, colon or rectal cancer (colorectal cancer), or other problems like bleeding. During the procedure, the doctor can take samples of tissue. The samples can then be checked for cancer or other conditions. The doctor can also take out polyps. How is colonoscopy done? This procedure is done in a doctor's office or a clinic or hospital. You will get medicine to help you relax and not feel pain. Some people find that they do not remember having the test because of the medicine. The doctor gently moves the colonoscope, or scope, through the colon. The scope is also a small video camera. It lets the doctor see the colon and take pictures. A colonoscopy usually takes 30 to 45 minutes. It may take longer if the doctor has to remove polyps. How do you prepare for the procedure? You need to clean out your colon before the procedure so the doctor can see all of your colon.  You may start the cleaning process a day or two before the test. This depends on which \"colon prep\" your doctor recommends. To clean your colon, you stop eating solid foods and drink only clear liquids. You can have water, tea, coffee, clear juices, clear broths, flavored ice pops, and gelatin (such as Jell-O). Do not drink anything red or purple, such as grape juice or fruit punch. And do not eat red or purple foods, such as grape ice pops or cherry gelatin. The day or night before the procedure, you drink a large amount of a special liquid. This causes loose, frequent stools. You will go to the bathroom a lot. It is very important to drink all of the colon prep liquid. If you have problems drinking the liquid, call your doctor. For many people, the prep is worse than the test. It may be uncomfortable, and you may feel hungry on the clear liquid diet. Some people do not go to work or do their usual activities on the day of the prep. Arrange to have someone take you home after the test. 
What can you expect after a colonoscopy? The nurses will watch you for 1 to 2 hours until the medicines wear off. Then you can go home. You will need a ride. Your doctor will tell you when you can eat and do your usual activities. Your doctor will talk to you about when you will need your next colonoscopy. The results of your test and your risk for colorectal cancer will help your doctor decide how often you need to be checked. Follow-up care is a key part of your treatment and safety. Be sure to make and go to all appointments, and call your doctor if you are having problems. It's also a good idea to know your test results and keep a list of the medicines you take. Where can you learn more? Go to http://flora-sarkis.info/. Enter G194 in the search box to learn more about \"Learning About Colonoscopy. \" Current as of: July 26, 2016 Content Version: 11.3 © 9224-9611 [employer].  Care instructions adapted under license by [facility] [address] (which disclaims liability or warranty for this information). If you have questions about a medical condition or this instruction, always ask your healthcare professional. [address] any warranty or liability for your use of this information. Learning About Colonoscopy What is a colonoscopy? A colonoscopy is a test (also called a procedure) that lets a doctor look inside your large intestine. The doctor uses a thin, lighted tube called a colonoscope. The doctor uses it to look for small growths called polyps, colon or rectal cancer (colorectal cancer), or other problems like bleeding. During the procedure, the doctor can take samples of tissue. The samples can then be checked for cancer or other conditions. The doctor can also take out polyps. How is colonoscopy done? This procedure is done in a doctor's office or a clinic or hospital. You will get medicine to help you relax and not feel pain. Some people find that they do not remember having the test because of the medicine. The doctor gently moves the colonoscope, or scope, through the colon. The scope is also a small video camera. It lets the doctor see the colon and take pictures. A colonoscopy usually takes 30 to 45 minutes. It may take longer if the doctor has to remove polyps. How do you prepare for the procedure? You need to clean out your colon before the procedure so the doctor can see all of your colon. You may start the cleaning process a day or two before the test. This depends on which \"colon prep\" your doctor recommends. To clean your colon, you stop eating solid foods and drink only clear liquids. You can have water, tea, coffee, clear juices, clear broths, flavored ice pops, and gelatin (such as Jell-O). Do not drink anything red or purple, such as grape juice or fruit punch. And do not eat red or purple foods, such as grape ice pops or cherry gelatin. The day or night before the procedure, you drink a large amount of a special liquid. This causes loose, frequent stools. You will go to the bathroom a lot. It is very important to drink all of the colon prep liquid. If you have problems drinking the liquid, call your doctor. For many people, the prep is worse than the test. It may be uncomfortable, and you may feel hungry on the clear liquid diet. Some people do not go to work or do their usual activities on the day of the prep. Arrange to have someone take you home after the test. 
What can you expect after a colonoscopy? The nurses will watch you for 1 to 2 hours until the medicines wear off. Then you can go home. You will need a ride. Your doctor will tell you when you can eat and do your usual activities. Your doctor will talk to you about when you will need your next colonoscopy. The results of your test and your risk for colorectal cancer will help your doctor decide how often you need to be checked. Follow-up care is a key part of your treatment and safety. Be sure to make and go to all appointments, and call your doctor if you are having problems. It's also a good idea to know your test results and keep a list of the medicines you take. Where can you learn more? Go to http://flora-sarkis.info/. Enter B988 in the search box to learn more about \"Learning About Colonoscopy. \" Current as of: July 26, 2016 Content Version: 11.3 © 4852-1499 [employer]. Care instructions adapted under license by [employer] (which disclaims liability or warranty for this information). If you have questions about a medical condition or this instruction, always ask your healthcare professional. [place] [address] any warranty or liability for your use of this information. Introducing [facility]! Deatodd Arias Kind: Thank you for requesting a [employer] account.   Our records indicate that you already have an active [employer] account. You can access your account anytime at [email or web address] Did you know that you can access your hospital and ER discharge instructions at any time in [employer]? You can also review all of your test results from your hospital stay or ER visit. Additional Information If you have questions, please visit the Frequently Asked Questions section of the [employer] website at [email or web address] Remember, [employer] is NOT to be used for urgent needs. For medical emergencies, dial 911. Now available from your iPhone and Android! Please provide this summary of care documentation to your next provider. Your primary care clinician is listed as Carolina Hinton. If you have any questions after today's visit, please call [phone].

## 2017-08-30 NOTE — PROGRESS NOTES
1. Have you been to the ER, urgent care clinic since your last visit? Hospitalized since your last visit? No    2. Have you seen or consulted any other health care providers outside of the [address] [place] [address] since your last visit? Include any pap smears or colon screening.  No

## 2017-08-30 NOTE — ACP (ADVANCE CARE PLANNING)
Advance Care Planning (ACP) Provider Conversation Snapshot    Date of ACP Conversation: 08/30/17  Persons included in Conversation:  patient  Length of ACP Conversation in minutes:  <16 minutes (Non-Billable)    Authorized Decision Maker (if patient is incapable of making informed decisions): This person is: Other Legally Authorized Decision Maker (e.g. Next of Kin)          For Patients with Decision Making Capacity: \"In these circumstances, what matters most to you? \"  Care focused more on comfort or quality of life.     Conversation Outcomes / Follow-Up Plan:   Recommended completion of Advance Directive form after review of ACP materials and conversation with prospective healthcare agent
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
## 2017-09-12 ENCOUNTER — HOSPITAL ENCOUNTER (OUTPATIENT)
Dept: BONE DENSITY | Age: 57
Discharge: HOME | End: 2017-09-12
Attending: NURSE PRACTITIONER
Payer: MEDICARE

## 2017-09-12 DIAGNOSIS — Z78.0 POST-MENOPAUSAL: ICD-10-CM

## 2017-09-12 PROCEDURE — 77081 DXA BONE DENSITY APPENDICULR: CPT

## 2017-09-18 ENCOUNTER — HOSPITAL ENCOUNTER (OUTPATIENT)
Dept: LAB | Age: 57
Discharge: HOME | End: 2017-09-18
Payer: MEDICARE

## 2017-09-18 LAB
EST. AVERAGE GLUCOSE BLD GHB EST-MCNC: 114 MG/DL
HBA1C MFR BLD: 5.6 % (ref 4.2–5.6)

## 2017-09-18 PROCEDURE — 83036 HEMOGLOBIN GLYCOSYLATED A1C: CPT | Performed by: INTERNAL MEDICINE

## 2017-09-18 PROCEDURE — 36415 COLL VENOUS BLD VENIPUNCTURE: CPT | Performed by: INTERNAL MEDICINE

## 2017-09-18 PROCEDURE — 83001 ASSAY OF GONADOTROPIN (FSH): CPT | Performed by: INTERNAL MEDICINE

## 2017-09-18 PROCEDURE — 83002 ASSAY OF GONADOTROPIN (LH): CPT | Performed by: INTERNAL MEDICINE

## 2017-09-18 PROCEDURE — 82670 ASSAY OF TOTAL ESTRADIOL: CPT | Performed by: INTERNAL MEDICINE

## 2017-09-19 LAB — ESTRADIOL SERPL-MCNC: 24.8 PG/ML

## 2017-09-20 LAB
FSH SERPL-ACNC: 25.3 MIU/ML
LH SERPL-ACNC: 14.5 MIU/ML

## 2017-09-26 ENCOUNTER — TELEPHONE (OUTPATIENT)
Dept: CARDIOTHORACIC SURGERY | Age: 57
End: 2017-09-26

## 2017-09-26 NOTE — TELEPHONE ENCOUNTER
Patient called about her bill. She stated that I stated\" I have her letter on file. \" I let her know that when I checked her in I stated\" I have your medicare card hereon file\" . I was not aware of an updated Coral Slimmer card on file with us. She will call billing and see if she can get a letter stating she is still covered.

## 2017-10-06 ENCOUNTER — OFFICE VISIT (OUTPATIENT)
Dept: SURGERY | Age: 57
End: 2017-10-06

## 2017-10-06 ENCOUNTER — HOSPITAL ENCOUNTER (OUTPATIENT)
Dept: LAB | Age: 57
Discharge: HOME | End: 2017-10-06
Payer: MEDICARE

## 2017-10-06 VITALS
TEMPERATURE: 96.1 F | DIASTOLIC BLOOD PRESSURE: 78 MMHG | SYSTOLIC BLOOD PRESSURE: 138 MMHG | HEIGHT: 72 IN | HEART RATE: 66 BPM | OXYGEN SATURATION: 95 % | RESPIRATION RATE: 20 BRPM

## 2017-10-06 DIAGNOSIS — E04.2 NONTOXIC MULTINODULAR GOITER: ICD-10-CM

## 2017-10-06 DIAGNOSIS — Z12.11 ENCOUNTER FOR SCREENING COLONOSCOPY: Primary | ICD-10-CM

## 2017-10-06 LAB
T4 FREE SERPL-MCNC: 1.1 NG/DL (ref 0.7–1.5)
TSH SERPL DL<=0.05 MIU/L-ACNC: 2.05 UIU/ML (ref 0.36–3.74)

## 2017-10-06 PROCEDURE — 84443 ASSAY THYROID STIM HORMONE: CPT | Performed by: INTERNAL MEDICINE

## 2017-10-06 PROCEDURE — 36415 COLL VENOUS BLD VENIPUNCTURE: CPT | Performed by: INTERNAL MEDICINE

## 2017-10-06 PROCEDURE — 84439 ASSAY OF FREE THYROXINE: CPT | Performed by: INTERNAL MEDICINE

## 2017-10-06 RX ORDER — IBUPROFEN 800 MG/1
TABLET ORAL
COMMUNITY
Start: 2017-08-23 | End: 2018-02-15 | Stop reason: SDUPTHER

## 2017-10-06 RX ORDER — POLYETHYLENE GLYCOL 3350, SODIUM SULFATE ANHYDROUS, SODIUM BICARBONATE, SODIUM CHLORIDE, POTASSIUM CHLORIDE 236; 22.74; 6.74; 5.86; 2.97 G/4L; G/4L; G/4L; G/4L; G/4L
POWDER, FOR SOLUTION ORAL
Qty: 1 BOTTLE | Refills: 0 | Status: SHIPPED | OUTPATIENT
Start: 2017-10-06 | End: 2017-11-09

## 2017-10-06 RX ORDER — LEVOTHYROXINE SODIUM 50 UG/1
50 TABLET ORAL
COMMUNITY
Start: 2017-08-01

## 2017-10-06 NOTE — PATIENT INSTRUCTIONS
If you have any questions or concerns about today's appointment, the verbal and/or written instructions you were given for follow up care, please call our office at [phone].     [facility] - [address]    [phone] office  [phone]
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
## 2017-10-11 ENCOUNTER — OFFICE VISIT (OUTPATIENT)
Dept: SURGERY | Age: 57
End: 2017-10-11

## 2017-10-11 VITALS
WEIGHT: 293 LBS | DIASTOLIC BLOOD PRESSURE: 85 MMHG | BODY MASS INDEX: 39.68 KG/M2 | TEMPERATURE: 95.6 F | RESPIRATION RATE: 20 BRPM | HEIGHT: 72 IN | SYSTOLIC BLOOD PRESSURE: 151 MMHG | HEART RATE: 61 BPM

## 2017-10-11 DIAGNOSIS — R14.0 BLOATING: ICD-10-CM

## 2017-10-11 DIAGNOSIS — K44.9 HIATAL HERNIA: Primary | ICD-10-CM

## 2017-10-11 RX ORDER — ESOMEPRAZOLE MAGNESIUM 40 MG/1
40 CAPSULE, DELAYED RELEASE ORAL DAILY
Qty: 30 CAP | Refills: 0 | Status: SHIPPED | OUTPATIENT
Start: 2017-10-11 | End: 2020-02-19

## 2017-10-11 NOTE — PROGRESS NOTES
Segun Carrasco is a 65 y.o. female who presents today with   Chief Complaint   Patient presents with    Hernia (Non Specific)     Pt presents today for evaluation of hiatal hernia. CTA chest 8/17/2017                1. Have you been to the ER, urgent care clinic since your last visit? Hospitalized since your last visit? No    2. Have you seen or consulted any other health care providers outside of the [address] [place] [address] since your last visit? Include any pap smears or colon screening.  No

## 2017-10-11 NOTE — MR AVS SNAPSHOT
Visit Information Date & Time Provider Department Dept. Phone Encounter #  
 10/11/2017 10:45 AM Roni Shelton MD [facility] [phone] [id number] Upcoming Health Maintenance Date Due COLONOSCOPY 1/1/2015 MEDICARE YEARLY EXAM 8/31/2018 BREAST CANCER SCRN MAMMOGRAM 8/1/2019 PAP AKA CERVICAL CYTOLOGY 8/9/2019 DTaP/Tdap/Td series (2 - Td) 10/29/2020 Allergies as of 10/11/2017  Review Complete On: 10/11/2017 By: Nhung Mo Severity Noted Reaction Type Reactions Oxycodone High 10/09/2013    Anaphylaxis, Hives Adhesive Tape-silicones  78/12/9467    Rash Paper tape is okay to use. Celebrex [Celecoxib]  10/09/2013    Hives Contrast Dye [Iodine]  03/21/2016    Nausea Only Abdominal pain, dizziness Cortisone  08/15/2013    Nausea and Vomiting Flexeril [Cyclobenzaprine]  08/04/2013    Nausea and Vomiting Pt states also causes confusion Keflex [Cephalexin]  11/30/2016    Diarrhea, Nausea Only Joint pain Current Immunizations  Reviewed on 3/29/2017 Name Date Influenza Vaccine Split 10/24/2012, 10/17/2011, 10/29/2010 TDAP Vaccine 10/29/2010 Not reviewed this visit You Were Diagnosed With   
  
 Codes Comments Hiatal hernia    -  Primary ICD-10-CM: K44.9 ICD-9-CM: 476. 3 Bloating     ICD-10-CM: R14.0 ICD-9-CM: 951. 3 Vitals BP Pulse Temp Resp Height(growth percentile) Weight(growth percentile) 151/85 (BP 1 Location: Left arm, BP Patient Position: At rest) 61 95.6 °F (35.3 °C) (Oral) 20 6' 1\" (1.854 m) (!) 420 lb (190.5 kg) LMP BMI OB Status Smoking Status 08/06/2017 (Approximate) 55.41 kg/m2 Having regular periods Never Smoker BMI and BSA Data Body Mass Index Body Surface Area 55.41 kg/m 2 3.13 m 2 Preferred Pharmacy Pharmacy Name Phone [facility] [address] [facility], [address] [place] [address] [phone] Your Updated Medication List  
  
   
This list is accurate as of: 10/11/17 11:46 AM.  Always use your most recent med list.  
  
  
  
  
 Cholecalciferol (Vitamin D3) 50,000 unit Cap Take  by mouth Every Mon, Wed & Sun.  
  
 cinnamon bark (bulk) Powd Take 40 mg by mouth daily. clotrimazole-betamethasone topical cream  
Commonly known as:  Beverli [place] Apply  to affected area two (2) times a day. COQ10  PO Take 1 Tab by mouth daily. EPINEPHrine 0.3 mg/0.3 mL injection Commonly known as:  EPIPEN  
0.3 mL by IntraMUSCular route once as needed for Anaphylaxis for up to 1 dose. Ok to dispense #2 if box contains 2 pens. esomeprazole 40 mg capsule Commonly known as:  NexIUM Take 1 Cap by mouth daily. Indications: Heartburn FISH OIL 1,000 mg Cap Generic drug:  omega-3 fatty acids-vitamin e Take 1 Cap by mouth daily. flaxseed oil 1,000 mg Cap Take 1,000 mg by mouth daily. HYDROcodone-acetaminophen 7.5-325 mg per tablet Commonly known as:  Nayely Fothergill Take 1 Tab by mouth every six (6) hours as needed. Max Daily Amount: 4 Tabs. ibuprofen 800 mg tablet Commonly known as:  MOTRIN  
  
 levothyroxine 50 mcg tablet Commonly known as:  SYNTHROID  
  
 LORazepam 1 mg tablet Commonly known as:  ATIVAN Take 1 Tab by mouth daily as needed for Anxiety (Severe). MAGNESIUM PO Take 400 mg by mouth daily. OTHER Ground seaweed - 1 tsp. Daily. PEG 3350-Electrolytes 236-22.74-6.74 -5.86 gram suspension Commonly known as:  GO-LYTELY Take as directed for bowel prep  Indications: BOWEL EVACUATION  
  
 psyllium packet Commonly known as:  METAMUCIL Take 1 Packet by mouth daily. traZODone 150 mg tablet Commonly known as:  Alan Lipps Take 1 Tab by mouth three (3) times daily. tretinoin 0.05 % topical cream  
Commonly known as:  RETIN-A Apply to affected area at night time. TURMERIC (BULK) Take 40 mg by mouth daily. VITAMIN B COMPLEX PO Take 1 Cap by mouth daily. VITAMIN C 250 mg tablet Generic drug:  ascorbic acid (vitamin C) Take 250 mg by mouth daily. vitamin e 200 unit capsule Commonly known as:  [address] Take 200 Units by mouth daily. Prescriptions Sent to Pharmacy Refills  
 esomeprazole (NEXIUM) 40 mg capsule 0 Sig: Take 1 Cap by mouth daily. Indications: Heartburn Class: Normal  
 [facility] [id number] [address] [facility], [address] [place] [address] #: [phone] Route: Oral  
  
Introducing [facility] SERVICES! Dear Any Davies: Thank you for requesting a [employer] account. Our records indicate that you already have an active [employer] account. You can access your account anytime at [email or web address] Did you know that you can access your hospital and ER discharge instructions at any time in [employer]? You can also review all of your test results from your hospital stay or ER visit. Additional Information If you have questions, please visit the Frequently Asked Questions section of the [employer] website at [email or web address] Remember, [employer] is NOT to be used for urgent needs. For medical emergencies, dial 911. Now available from your iPhone and Android! Please provide this summary of care documentation to your next provider. Your primary care clinician is listed as Leatha Magana. If you have any questions after today's visit, please call [phone].
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
## 2017-10-13 ENCOUNTER — TELEPHONE (OUTPATIENT)
Dept: SURGERY | Age: 57
End: 2017-10-13

## 2017-10-13 NOTE — TELEPHONE ENCOUNTER
Patient Gerard Dutton ([birth year]) called today stating that her ins will not cover the nexium prescribed by Dr. Segun Rankin. She is requesting a prescription change to Prilosec which would be covered. Her pharmacy is [facility] in [facility]. She is requesting a call back once prescription is changed. She is in the process of obtaining a GI doctor.       [phone]

## 2017-10-18 ENCOUNTER — HOSPITAL ENCOUNTER (OUTPATIENT)
Dept: LAB | Age: 57
Discharge: HOME | End: 2017-10-18
Payer: MEDICARE

## 2017-10-18 DIAGNOSIS — R10.9 ABDOMINAL PAIN: ICD-10-CM

## 2017-10-18 LAB
ALBUMIN SERPL-MCNC: 3.5 G/DL (ref 3.4–5)
ALBUMIN/GLOB SERPL: 1.1 {RATIO} (ref 0.8–1.7)
ALP SERPL-CCNC: 73 U/L (ref 45–117)
ALT SERPL-CCNC: 26 U/L (ref 13–56)
ANION GAP SERPL CALC-SCNC: 6 MMOL/L (ref 3–18)
AST SERPL-CCNC: 21 U/L (ref 15–37)
BASOPHILS # BLD: 0 K/UL (ref 0–0.06)
BASOPHILS NFR BLD: 0 % (ref 0–2)
BILIRUB SERPL-MCNC: 0.3 MG/DL (ref 0.2–1)
BUN SERPL-MCNC: 16 MG/DL (ref 7–18)
BUN/CREAT SERPL: 16 (ref 12–20)
CALCIUM SERPL-MCNC: 8.1 MG/DL (ref 8.5–10.1)
CHLORIDE SERPL-SCNC: 102 MMOL/L (ref 100–108)
CO2 SERPL-SCNC: 29 MMOL/L (ref 21–32)
CREAT SERPL-MCNC: 0.97 MG/DL (ref 0.6–1.3)
DIFFERENTIAL METHOD BLD: ABNORMAL
EOSINOPHIL # BLD: 0.2 K/UL (ref 0–0.4)
EOSINOPHIL NFR BLD: 4 % (ref 0–5)
ERYTHROCYTE [DISTWIDTH] IN BLOOD BY AUTOMATED COUNT: 14.6 % (ref 11.6–14.5)
GLOBULIN SER CALC-MCNC: 3.1 G/DL (ref 2–4)
GLUCOSE SERPL-MCNC: 127 MG/DL (ref 74–99)
HCT VFR BLD AUTO: 41.5 % (ref 35–45)
HGB BLD-MCNC: 13.7 G/DL (ref 12–16)
LIPASE SERPL-CCNC: 128 U/L (ref 73–393)
LYMPHOCYTES # BLD: 1.7 K/UL (ref 0.9–3.6)
LYMPHOCYTES NFR BLD: 27 % (ref 21–52)
MCH RBC QN AUTO: 29.3 PG (ref 24–34)
MCHC RBC AUTO-ENTMCNC: 33 G/DL (ref 31–37)
MCV RBC AUTO: 88.9 FL (ref 74–97)
MONOCYTES # BLD: 0.3 K/UL (ref 0.05–1.2)
MONOCYTES NFR BLD: 5 % (ref 3–10)
NEUTS SEG # BLD: 4 K/UL (ref 1.8–8)
NEUTS SEG NFR BLD: 64 % (ref 40–73)
PLATELET # BLD AUTO: 271 K/UL (ref 135–420)
PMV BLD AUTO: 9.8 FL (ref 9.2–11.8)
POTASSIUM SERPL-SCNC: 4.6 MMOL/L (ref 3.5–5.5)
PROT SERPL-MCNC: 6.6 G/DL (ref 6.4–8.2)
RBC # BLD AUTO: 4.67 M/UL (ref 4.2–5.3)
SODIUM SERPL-SCNC: 137 MMOL/L (ref 136–145)
WBC # BLD AUTO: 6.2 K/UL (ref 4.6–13.2)

## 2017-10-18 PROCEDURE — 83690 ASSAY OF LIPASE: CPT | Performed by: INTERNAL MEDICINE

## 2017-10-18 PROCEDURE — 85025 COMPLETE CBC W/AUTO DIFF WBC: CPT | Performed by: INTERNAL MEDICINE

## 2017-10-18 PROCEDURE — 80053 COMPREHEN METABOLIC PANEL: CPT | Performed by: INTERNAL MEDICINE

## 2017-10-18 PROCEDURE — 36415 COLL VENOUS BLD VENIPUNCTURE: CPT | Performed by: INTERNAL MEDICINE

## 2017-10-25 ENCOUNTER — OFFICE VISIT (OUTPATIENT)
Dept: FAMILY MEDICINE CLINIC | Age: 57
End: 2017-10-25

## 2017-10-25 VITALS
RESPIRATION RATE: 17 BRPM | HEIGHT: 72 IN | SYSTOLIC BLOOD PRESSURE: 143 MMHG | TEMPERATURE: 98.5 F | DIASTOLIC BLOOD PRESSURE: 82 MMHG | OXYGEN SATURATION: 91 % | HEART RATE: 74 BPM

## 2017-10-25 DIAGNOSIS — R10.10 PAIN OF UPPER ABDOMEN: Primary | ICD-10-CM

## 2017-10-25 NOTE — PROGRESS NOTES
1. Have you been to the ER, urgent care clinic since your last visit? Hospitalized since your last visit? {Yes when where and reason for visit:20441}    2. Have you seen or consulted any other health care providers outside of the [address] [place] [address] since your last visit? Include any pap smears or colon screening.  {Yes when where and reason for visit:20441}
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
## 2017-10-25 NOTE — PROGRESS NOTES
1. Have you been to the ER, urgent care clinic since your last visit? Hospitalized since your last visit  2. Have you seen or consulted any other health care providers outside of the [address] [place] [address] since your last visit? Include any pap smears or colon screening.

## 2017-10-25 NOTE — PATIENT INSTRUCTIONS

## 2017-10-25 NOTE — MR AVS SNAPSHOT
Visit Information Date & Time Provider Department Dept. Phone Encounter #  
 10/25/2017  1:15 PM Panda Baez [id number] Follow-up Instructions Return if symptoms worsen or fail to improve. Upcoming Health Maintenance Date Due COLONOSCOPY 1/1/2015 MEDICARE YEARLY EXAM 8/31/2018 BREAST CANCER SCRN MAMMOGRAM 8/1/2019 PAP AKA CERVICAL CYTOLOGY 8/9/2019 DTaP/Tdap/Td series (2 - Td) 10/29/2020 Allergies as of 10/25/2017  Review Complete On: 10/25/2017 By: Kelly Pete LPN Severity Noted Reaction Type Reactions Oxycodone High 10/09/2013    Anaphylaxis, Hives Adhesive Tape-silicones  84/38/9325    Rash Paper tape is okay to use. Celebrex [Celecoxib]  10/09/2013    Hives Contrast Dye [Iodine]  03/21/2016    Nausea Only Abdominal pain, dizziness Cortisone  08/15/2013    Nausea and Vomiting Flexeril [Cyclobenzaprine]  08/04/2013    Nausea and Vomiting Pt states also causes confusion Keflex [Cephalexin]  11/30/2016    Diarrhea, Nausea Only Joint pain Current Immunizations  Reviewed on 3/29/2017 Name Date Influenza Vaccine Split 10/24/2012, 10/17/2011, 10/29/2010 TDAP Vaccine 10/29/2010 Not reviewed this visit You Were Diagnosed With   
  
 Codes Comments Pain of upper abdomen    -  Primary ICD-10-CM: R10.10 ICD-9-CM: 789.09 Vitals BP Pulse Temp Resp Height(growth percentile) LMP  
 143/82 74 98.5 °F (36.9 °C) (Oral) 17 6' 1\" (1.854 m) 10/11/2017 SpO2 OB Status Smoking Status 91% Having regular periods Never Smoker Preferred Pharmacy Pharmacy Name Phone [facility], [address] [place] [address] [phone] Your Updated Medication List  
  
   
This list is accurate as of: 10/25/17  1:58 PM.  Always use your most recent med list.  
  
  
  
  
 Cholecalciferol (Vitamin D3) 50,000 unit Cap Take  by mouth Every Mon, Wed & Sun.  
  
 cinnamon bark (bulk) Powd Take 40 mg by mouth daily. clotrimazole-betamethasone topical cream  
Commonly known as:  Alphonsa Spies Apply  to affected area two (2) times a day. COQ10  PO Take 1 Tab by mouth daily. EPINEPHrine 0.3 mg/0.3 mL injection Commonly known as:  EPIPEN  
0.3 mL by IntraMUSCular route once as needed for Anaphylaxis for up to 1 dose. Ok to dispense #2 if box contains 2 pens. esomeprazole 40 mg capsule Commonly known as:  NexIUM Take 1 Cap by mouth daily. Indications: Heartburn FISH OIL 1,000 mg Cap Generic drug:  omega-3 fatty acids-vitamin e Take 1 Cap by mouth daily. flaxseed oil 1,000 mg Cap Take 1,000 mg by mouth daily. HYDROcodone-acetaminophen 7.5-325 mg per tablet Commonly known as:  Zeus Songster Take 1 Tab by mouth every six (6) hours as needed. Max Daily Amount: 4 Tabs. ibuprofen 800 mg tablet Commonly known as:  MOTRIN  
  
 levothyroxine 50 mcg tablet Commonly known as:  SYNTHROID  
  
 LORazepam 1 mg tablet Commonly known as:  ATIVAN Take 1 Tab by mouth daily as needed for Anxiety (Severe). MAGNESIUM PO Take 400 mg by mouth daily. OTHER Ground seaweed - 1 tsp. Daily. PEG 3350-Electrolytes 236-22.74-6.74 -5.86 gram suspension Commonly known as:  GO-LYTELY Take as directed for bowel prep  Indications: BOWEL EVACUATION  
  
 psyllium packet Commonly known as:  METAMUCIL Take 1 Packet by mouth daily. traZODone 150 mg tablet Commonly known as:  Paco Asha Take 1 Tab by mouth three (3) times daily. tretinoin 0.05 % topical cream  
Commonly known as:  RETIN-A Apply to affected area at night time. TURMERIC (BULK) Take 40 mg by mouth daily. VITAMIN B COMPLEX PO Take 1 Cap by mouth daily. VITAMIN C 250 mg tablet Generic drug:  ascorbic acid (vitamin C) Take 250 mg by mouth daily. vitamin e 200 unit capsule Commonly known as:  [address] Take 200 Units by mouth daily. Follow-up Instructions Return if symptoms worsen or fail to improve. To-Do List   
 10/25/2017 Imaging:  CT ABD WO CONT Patient Instructions Abdominal Pain: Care Instructions Your Care Instructions Abdominal pain has many possible causes. Some aren't serious and get better on their own in a few days. Others need more testing and treatment. If your pain continues or gets worse, you need to be rechecked and may need more tests to find out what is wrong. You may need surgery to correct the problem. Don't ignore new symptoms, such as fever, nausea and vomiting, urination problems, pain that gets worse, and dizziness. These may be signs of a more serious problem. Your doctor may have recommended a follow-up visit in the next 8 to 12 hours. If you are not getting better, you may need more tests or treatment. The doctor has checked you carefully, but problems can develop later. If you notice any problems or new symptoms, get medical treatment right away. Follow-up care is a key part of your treatment and safety. Be sure to make and go to all appointments, and call your doctor if you are having problems. It's also a good idea to know your test results and keep a list of the medicines you take. How can you care for yourself at home? · Rest until you feel better. · To prevent dehydration, drink plenty of fluids, enough so that your urine is light yellow or clear like water. Choose water and other caffeine-free clear liquids until you feel better. If you have kidney, heart, or liver disease and have to limit fluids, talk with your doctor before you increase the amount of fluids you drink. · If your stomach is upset, eat mild foods, such as rice, dry toast or crackers, bananas, and applesauce. Try eating several small meals instead of two or three large ones. · Wait until 48 hours after all symptoms have gone away before you have spicy foods, alcohol, and drinks that contain caffeine. · Do not eat foods that are high in fat. · Avoid anti-inflammatory medicines such as aspirin, ibuprofen (Advil, Motrin), and naproxen (Aleve). These can cause stomach upset. Talk to your doctor if you take daily aspirin for another health problem. When should you call for help? Call 911 anytime you think you may need emergency care. For example, call if: 
· You passed out (lost consciousness). · You pass maroon or very bloody stools. · You vomit blood or what looks like coffee grounds. · You have new, severe belly pain. Call your doctor now or seek immediate medical care if: 
· Your pain gets worse, especially if it becomes focused in one area of your belly. · You have a new or higher fever. · Your stools are black and look like tar, or they have streaks of blood. · You have unexpected vaginal bleeding. · You have symptoms of a urinary tract infection. These may include: 
¨ Pain when you urinate. ¨ Urinating more often than usual. 
¨ Blood in your urine. · You are dizzy or lightheaded, or you feel like you may faint. Watch closely for changes in your health, and be sure to contact your doctor if: 
· You are not getting better after 1 day (24 hours). Where can you learn more? Go to http://flora-sarkis.info/. Enter Q752 in the search box to learn more about \"Abdominal Pain: Care Instructions. \" Current as of: March 20, 2017 Content Version: 11.3 © 2208-1445 Hstry. Care instructions adapted under license by [employer] (which disclaims liability or warranty for this information). If you have questions about a medical condition or this instruction, always ask your healthcare professional. [address] any warranty or liability for your use of this information. Abdominal Pain: Care Instructions Your Care Instructions Abdominal pain has many possible causes. Some aren't serious and get better on their own in a few days. Others need more testing and treatment. If your pain continues or gets worse, you need to be rechecked and may need more tests to find out what is wrong. You may need surgery to correct the problem. Don't ignore new symptoms, such as fever, nausea and vomiting, urination problems, pain that gets worse, and dizziness. These may be signs of a more serious problem. Your doctor may have recommended a follow-up visit in the next 8 to 12 hours. If you are not getting better, you may need more tests or treatment. The doctor has checked you carefully, but problems can develop later. If you notice any problems or new symptoms, get medical treatment right away. Follow-up care is a key part of your treatment and safety. Be sure to make and go to all appointments, and call your doctor if you are having problems. It's also a good idea to know your test results and keep a list of the medicines you take. How can you care for yourself at home? · Rest until you feel better. · To prevent dehydration, drink plenty of fluids, enough so that your urine is light yellow or clear like water. Choose water and other caffeine-free clear liquids until you feel better. If you have kidney, heart, or liver disease and have to limit fluids, talk with your doctor before you increase the amount of fluids you drink. · If your stomach is upset, eat mild foods, such as rice, dry toast or crackers, bananas, and applesauce. Try eating several small meals instead of two or three large ones. · Wait until 48 hours after all symptoms have gone away before you have spicy foods, alcohol, and drinks that contain caffeine. · Do not eat foods that are high in fat. · Avoid anti-inflammatory medicines such as aspirin, ibuprofen (Advil, Motrin), and naproxen (Aleve). These can cause stomach upset.  Talk to your doctor if you take daily aspirin for another health problem. When should you call for help? Call 911 anytime you think you may need emergency care. For example, call if: 
· You passed out (lost consciousness). · You pass maroon or very bloody stools. · You vomit blood or what looks like coffee grounds. · You have new, severe belly pain. Call your doctor now or seek immediate medical care if: 
· Your pain gets worse, especially if it becomes focused in one area of your belly. · You have a new or higher fever. · Your stools are black and look like tar, or they have streaks of blood. · You have unexpected vaginal bleeding. · You have symptoms of a urinary tract infection. These may include: 
¨ Pain when you urinate. ¨ Urinating more often than usual. 
¨ Blood in your urine. · You are dizzy or lightheaded, or you feel like you may faint. Watch closely for changes in your health, and be sure to contact your doctor if: 
· You are not getting better after 1 day (24 hours). Where can you learn more? Go to http://flora-sarkis.info/. Enter C808 in the search box to learn more about \"Abdominal Pain: Care Instructions. \" Current as of: March 20, 2017 Content Version: 11.3 © 3916-3240 Wee Web. Care instructions adapted under license by [employer] (which disclaims liability or warranty for this information). If you have questions about a medical condition or this instruction, always ask your healthcare professional. [place] [address] any warranty or liability for your use of this information. Introducing [facility]! Dear Angelita Scott: Thank you for requesting a ShopWiki account. Our records indicate that you already have an active ShopWiki account. You can access your account anytime at [email or web address] Did you know that you can access your hospital and ER discharge instructions at any time in [employer]? You can also review all of your test results from your hospital stay or ER visit. Additional Information If you have questions, please visit the Frequently Asked Questions section of the [employer] website at [email or web address] Remember, [employer] is NOT to be used for urgent needs. For medical emergencies, dial 911. Now available from your iPhone and Android! Please provide this summary of care documentation to your next provider. Your primary care clinician is listed as [facility]. If you have any questions after today's visit, please call [phone].

## 2017-10-30 ENCOUNTER — HOSPITAL ENCOUNTER (OUTPATIENT)
Dept: CT IMAGING | Age: 57
Discharge: HOME | End: 2017-10-30
Attending: NURSE PRACTITIONER
Payer: MEDICARE

## 2017-10-30 DIAGNOSIS — R10.10 PAIN OF UPPER ABDOMEN: ICD-10-CM

## 2017-10-30 PROCEDURE — 74150 CT ABDOMEN W/O CONTRAST: CPT

## 2017-10-31 NOTE — PROGRESS NOTES
Pls let patient know CT abdomen :    Showed no acute intra- abdominal findings but positive atherosclerosis of aorta- hardening of the arteries and plaque formation:     pls advise lifestyle change focus on weight management, physical activity and controlled diet. Patient last lipids done by Dr José Miguel Holloway were elevated  And have not had repeat since then.   Please advise patient to follow up for repeat lipids and see her cardiologist.- she would need to be on cholesterol lowering medication if still elevated               Cholesterol, total 262 (H) <200 MG/DL Final  Triglyceride 273 (H) <150 MG/DL Final  HDL Cholesterol 38 (L) 40 - 60 MG/DL Final  LDL, calculated 169.4 (H) 0 - 100 MG/DL Final  VLDL, calculated 54.6   MG/DL Final  CHOL/HDL Ratio 6.9 (H) 0 -

## 2017-11-01 ENCOUNTER — OFFICE VISIT (OUTPATIENT)
Dept: ORTHOPEDIC SURGERY | Facility: CLINIC | Age: 57
End: 2017-11-01

## 2017-11-01 VITALS
HEART RATE: 60 BPM | DIASTOLIC BLOOD PRESSURE: 90 MMHG | WEIGHT: 293 LBS | SYSTOLIC BLOOD PRESSURE: 140 MMHG | BODY MASS INDEX: 39.68 KG/M2 | HEIGHT: 72 IN | RESPIRATION RATE: 16 BRPM | OXYGEN SATURATION: 95 %

## 2017-11-01 DIAGNOSIS — S30.0XXA LUMBAR CONTUSION, INITIAL ENCOUNTER: ICD-10-CM

## 2017-11-01 DIAGNOSIS — S80.02XA CONTUSION, KNEE AND LOWER LEG, LEFT, INITIAL ENCOUNTER: Primary | ICD-10-CM

## 2017-11-01 DIAGNOSIS — M79.18 MUSCLE PAIN, MYOFACIAL: ICD-10-CM

## 2017-11-01 DIAGNOSIS — M47.896 OTHER OSTEOARTHRITIS OF SPINE, LUMBAR REGION: ICD-10-CM

## 2017-11-01 DIAGNOSIS — S80.12XA CONTUSION, KNEE AND LOWER LEG, LEFT, INITIAL ENCOUNTER: Primary | ICD-10-CM

## 2017-11-01 DIAGNOSIS — S80.02XA TRAUMATIC HEMATOMA OF KNEE, LEFT, INITIAL ENCOUNTER: ICD-10-CM

## 2017-11-01 RX ORDER — HYDROCODONE BITARTRATE AND ACETAMINOPHEN 7.5; 325 MG/1; MG/1
1 TABLET ORAL
Qty: 21 TAB | Refills: 0 | Status: SHIPPED | OUTPATIENT
Start: 2017-11-01 | End: 2017-11-16 | Stop reason: SDUPTHER
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
## 2017-11-01 NOTE — MR AVS SNAPSHOT
Visit Information Date & Time Provider Department Dept. Phone Encounter #  
 11/1/2017  3:00 PM Jr Addison PA-C [facility]  [phone] Upcoming Health Maintenance Date Due COLONOSCOPY 1/1/2015 MEDICARE YEARLY EXAM 8/31/2018 BREAST CANCER SCRN MAMMOGRAM 8/1/2019 PAP AKA CERVICAL CYTOLOGY 8/9/2019 DTaP/Tdap/Td series (2 - Td) 10/29/2020 Allergies as of 11/1/2017  Review Complete On: 11/1/2017 By: Moreno Chávez LPN Severity Noted Reaction Type Reactions Oxycodone High 10/09/2013    Anaphylaxis, Hives Adhesive Tape-silicones  68/28/3150    Rash Paper tape is okay to use. Celebrex [Celecoxib]  10/09/2013    Hives Contrast Dye [Iodine]  03/21/2016    Nausea Only Abdominal pain, dizziness Cortisone  08/15/2013    Nausea and Vomiting Flexeril [Cyclobenzaprine]  08/04/2013    Nausea and Vomiting Pt states also causes confusion Keflex [Cephalexin]  11/30/2016    Diarrhea, Nausea Only Joint pain Current Immunizations  Reviewed on 3/29/2017 Name Date Influenza Vaccine Split 10/24/2012, 10/17/2011, 10/29/2010 TDAP Vaccine 10/29/2010 Not reviewed this visit You Were Diagnosed With   
  
 Codes Comments Contusion, knee and lower leg, left, initial encounter    -  Primary ICD-10-CM: S80.02XA, P16.36PR ICD-9-CM: 924.10 Lumbar contusion, initial encounter     ICD-10-CM: S30. 0XXA ICD-9-CM: 922.31 Other osteoarthritis of spine, lumbar region     ICD-10-CM: M47.896 ICD-9-CM: 721.3 Traumatic hematoma of knee, left, initial encounter     ICD-10-CM: S80.02XA ICD-9-CM: 924.11 Vitals BP Pulse Resp Height(growth percentile) Weight(growth percentile) LMP  
 140/90 (BP 1 Location: Left arm, BP Patient Position: Sitting) 60 16 6' 1\" (1.854 m) (!) 415 lb (188.2 kg) 10/11/2017 SpO2 BMI OB Status Smoking Status 95% 54.75 kg/m2 Having regular periods Never Smoker BMI and BSA Data Body Mass Index Body Surface Area 54.75 kg/m 2 3.11 m 2 Preferred Pharmacy Pharmacy Name Phone [facility], [address] [place] [address] [phone] Your Updated Medication List  
  
   
This list is accurate as of: 11/1/17  3:43 PM.  Always use your most recent med list.  
  
  
  
  
 Cholecalciferol (Vitamin D3) 50,000 unit Cap Take  by mouth Every Mon, Wed & Sun.  
  
 cinnamon bark (bulk) Powd Take 40 mg by mouth daily. clotrimazole-betamethasone topical cream  
Commonly known as:  Zena Quarto Apply  to affected area two (2) times a day. COQ10  PO Take 1 Tab by mouth daily. EPINEPHrine 0.3 mg/0.3 mL injection Commonly known as:  EPIPEN  
0.3 mL by IntraMUSCular route once as needed for Anaphylaxis for up to 1 dose. Ok to dispense #2 if box contains 2 pens. esomeprazole 40 mg capsule Commonly known as:  NexIUM Take 1 Cap by mouth daily. Indications: Heartburn FISH OIL 1,000 mg Cap Generic drug:  omega-3 fatty acids-vitamin e Take 1 Cap by mouth daily. flaxseed oil 1,000 mg Cap Take 1,000 mg by mouth daily. HYDROcodone-acetaminophen 7.5-325 mg per tablet Commonly known as:  [place] Mikie Take 1 Tab by mouth every six (6) hours as needed. Max Daily Amount: 4 Tabs. ibuprofen 800 mg tablet Commonly known as:  MOTRIN  
  
 levothyroxine 50 mcg tablet Commonly known as:  SYNTHROID  
  
 LORazepam 1 mg tablet Commonly known as:  ATIVAN Take 1 Tab by mouth daily as needed for Anxiety (Severe). MAGNESIUM PO Take 400 mg by mouth daily. OTHER Ground seaweed - 1 tsp. Daily. PEG 3350-Electrolytes 236-22.74-6.74 -5.86 gram suspension Commonly known as:  GO-LYTELY Take as directed for bowel prep  Indications: BOWEL EVACUATION  
  
 psyllium packet Commonly known as:  METAMUCIL  
 Take 1 Packet by mouth daily. traZODone 150 mg tablet Commonly known as:  [place] Take 1 Tab by mouth three (3) times daily. tretinoin 0.05 % topical cream  
Commonly known as:  RETIN-A Apply to affected area at night time. TURMERIC (BULK) Take 40 mg by mouth daily. VITAMIN B COMPLEX PO Take 1 Cap by mouth daily. VITAMIN C 250 mg tablet Generic drug:  ascorbic acid (vitamin C) Take 250 mg by mouth daily. vitamin e 200 unit capsule Commonly known as:  [address] Take 200 Units by mouth daily. Introducing [facility]! Dear Orquidea Thomson: Thank you for requesting a Popular Pays account. Our records indicate that you already have an active Popular Pays account. You can access your account anytime at [email or web address] Did you know that you can access your hospital and ER discharge instructions at any time in Popular Pays? You can also review all of your test results from your hospital stay or ER visit. Additional Information If you have questions, please visit the Frequently Asked Questions section of the Popular Pays website at [email or web address] Remember, Popular Pays is NOT to be used for urgent needs. For medical emergencies, dial 911. Now available from your iPhone and Android! Please provide this summary of care documentation to your next provider. Your primary care clinician is listed as [facility]. If you have any questions after today's visit, please call [phone].

## 2017-11-02 ENCOUNTER — TELEPHONE (OUTPATIENT)
Dept: ORTHOPEDIC SURGERY | Facility: CLINIC | Age: 57
End: 2017-11-02

## 2017-11-02 NOTE — PROGRESS NOTES
Patient returned call, explained results of CT, Patient was also advised of atherosclerosis, patient states she will follow up with Cardiology. Patient would also like to have labs ordered so she can go to [facility] to have her blood work drawn to repeat cholesterol levels.

## 2017-11-02 NOTE — TELEPHONE ENCOUNTER
Patient is requesting a call back from ALECIA Walker's office - says she has a couple of questions after yesterday's office visit. Please contact patient at [phone].

## 2017-11-02 NOTE — PROGRESS NOTES
Contacted patient, no answer. Voicemail left for patient to contact office at her earliest convenience.

## 2017-11-09 ENCOUNTER — HOSPITAL ENCOUNTER (OUTPATIENT)
Dept: PHYSICAL THERAPY | Age: 57
Discharge: HOME | End: 2017-11-09
Payer: MEDICARE

## 2017-11-09 PROCEDURE — 97162 PT EVAL MOD COMPLEX 30 MIN: CPT

## 2017-11-09 PROCEDURE — G8987 SELF CARE CURRENT STATUS: HCPCS

## 2017-11-09 PROCEDURE — 97110 THERAPEUTIC EXERCISES: CPT

## 2017-11-09 PROCEDURE — G8988 SELF CARE GOAL STATUS: HCPCS

## 2017-11-09 RX ORDER — POLYETHYLENE GLYCOL 3350, SODIUM SULFATE ANHYDROUS, SODIUM BICARBONATE, SODIUM CHLORIDE, POTASSIUM CHLORIDE 236; 22.74; 6.74; 5.86; 2.97 G/4L; G/4L; G/4L; G/4L; G/4L
POWDER, FOR SOLUTION ORAL
Qty: 1 BOTTLE | Refills: 0 | Status: SHIPPED | OUTPATIENT
Start: 2017-11-09 | End: 2017-11-22 | Stop reason: ALTCHOICE
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
## 2017-11-13 ENCOUNTER — HOSPITAL ENCOUNTER (OUTPATIENT)
Dept: PHYSICAL THERAPY | Age: 57
Discharge: HOME | End: 2017-11-13
Payer: MEDICARE

## 2017-11-13 PROCEDURE — 97113 AQUATIC THERAPY/EXERCISES: CPT

## 2017-11-13 NOTE — PROGRESS NOTES
PHYSICAL THERAPY - DAILY TREATMENT NOTE - AQUATICS    Patient Name: Blayne Arguello        Date: 2017  : 1960   YES Patient  Verified  Visit #:   2   of   10  Insurance: Payor: Sary Elizondo / Plan: [place] MEDICARE PART A & B / Product Type: Medicare /      In time: 100 Out time: 148   Total Treatment Time: 48     Medicare Time Tracking (below)   Total Timed Codes (min):  24 1:1 Treatment Time:  24     TREATMENT AREA =  Low back pain [M54.5]  Left knee pain [M25.562]    SUBJECTIVE    Pain Level (on 0 to 10 scale):  7/8  / 10   Medication Changes/New allergies or changes in medical history, any new surgeries or procedures? NO    If yes, update Summary List   Subjective Functional Status/Changes:  []  No changes reported     \"i did try aquatic 5 years ago\"         OBJECTIVE  48 (24) min Therapeutic Exercise:   [x]  Aquatic Therapy   Rationale:      increase ROM, increase strength, improve coordination, proprioception and improve balance to improve the patients ability to perform ADL's and increase level of independence.      [x]   Patient Education:  Continue Aquatic Therapy and HEP as instructed     UE exercise resistance:                                                  LE exercises:                                                                 [x] none                                                                             []  thera-band resistance       [] small water weights                                                   [] no UE support       [] medium water weights                                              []  1 UE support        [] large water weights                                                   [x]  2 UE support          [] back supported on wall       [] thera-band      SLS UE support:                      [] both UE                          [] 1 UE       [] 1 finger/fingers       []  none       [] EC     Post Treatment Pain Level (on 0 to 10) scale:   10  / 10 Other  pt cued in gentle marching and she modified to reclined at side of pool     ASSESSMENT    Assessment/Changes in Function:   Pt demonstrated poor tolerance for multiple AROM and postures today. She could not tolerate full lap walks and had greater knee pain with lateral walks so decreased reps. pt could not tolerate SLS due to knee pain and was given multiple cues for marching  and small steps in place. She required cueing for pacing and postural correction. She has strong pain focus and is able to be redirected to task intermittantly. Pt has difficulty getting from sit to stand of chair height and was educated to sit on higher bench next time. []  See Progress Note/Recertification   Patient will continue to benefit from skilled PT services to modify and progress therapeutic interventions, address functional mobility deficits, address ROM deficits, address strength deficits, analyze and address soft tissue restrictions, analyze and cue movement patterns, analyze and modify body mechanics/ergonomics, assess and modify postural abnormalities and instruct in home and community integration to attain remaining goals. Progress toward goals / Updated goals:    1. Pt will be safe and independent with HEP for participation in their rehabilitation.   Progressing towards aquatic therapy independence         PLAN    [x]  Upgrade activities as tolerated YES Continue plan of care   []  Discharge due to :    []  Other:      Therapist: Jesus Cannon PT    Date: 11/13/2017 Time: 8:49 AM   Future Appointments  Date Time Provider Siva Wright   11/13/2017 1:00 PM Garry Gaytan, PT [facility]   11/16/2017 1:00 PM Garry Gaytan PT [facility]   11/20/2017 4:30 PM [facility]   11/21/2017 10:00 AM Levi Ramirez PA-C [facility] SCHED   11/22/2017 3:30 PM [facility]   11/27/2017 12:00 PM [facility]   11/30/2017 12:00 PM [facility]

## 2017-11-14 ENCOUNTER — TELEPHONE (OUTPATIENT)
Dept: FAMILY MEDICINE CLINIC | Age: 57
End: 2017-11-14

## 2017-11-14 ENCOUNTER — ANESTHESIA EVENT (OUTPATIENT)
Dept: ENDOSCOPY | Age: 57
End: 2017-11-14
Payer: MEDICARE

## 2017-11-14 RX ORDER — HYDROCODONE BITARTRATE AND ACETAMINOPHEN 7.5; 325 MG/1; MG/1
1 TABLET ORAL
Qty: 14 TAB | Refills: 0 | Status: CANCELLED | OUTPATIENT
Start: 2017-11-14

## 2017-11-14 NOTE — TELEPHONE ENCOUNTER
sophia calling has questions concerning labs and chol bld test ordered. Also about a land  OT drainage massage at [facility]. Also question  Concerning thyroid surgery. Would like to speak with you.  Phone [phone] directly also can leave a message

## 2017-11-14 NOTE — TELEPHONE ENCOUNTER
Spoke with patient who states she would like to have her Lipid panel completed at [facility]; Needs labs put in the system; Patient also states that during her last visit and visit prior, she says that it was discussed about her swelling, Insurance needs it ordered as Land OT drainage massage. To be completed at [facility] in [place]. Please advise.

## 2017-11-15 ENCOUNTER — ANESTHESIA (OUTPATIENT)
Dept: ENDOSCOPY | Age: 57
End: 2017-11-15
Payer: MEDICARE

## 2017-11-15 ENCOUNTER — HOSPITAL ENCOUNTER (OUTPATIENT)
Age: 57
Setting detail: OUTPATIENT SURGERY
Discharge: HOME | End: 2017-11-15
Attending: COLON & RECTAL SURGERY | Admitting: COLON & RECTAL SURGERY
Payer: MEDICARE

## 2017-11-15 VITALS
SYSTOLIC BLOOD PRESSURE: 118 MMHG | WEIGHT: 293 LBS | OXYGEN SATURATION: 98 % | BODY MASS INDEX: 39.68 KG/M2 | RESPIRATION RATE: 16 BRPM | HEART RATE: 60 BPM | DIASTOLIC BLOOD PRESSURE: 58 MMHG | HEIGHT: 72 IN | TEMPERATURE: 98 F

## 2017-11-15 DIAGNOSIS — E78.5 HYPERLIPIDEMIA WITH TARGET LDL LESS THAN 130: Primary | ICD-10-CM

## 2017-11-15 PROBLEM — Z12.11 ENCOUNTER FOR SCREENING COLONOSCOPY: Status: ACTIVE | Noted: 2017-11-15

## 2017-11-15 PROCEDURE — 77030009426 HC FCPS BIOP ENDOSC BSC -B: Performed by: COLON & RECTAL SURGERY

## 2017-11-15 PROCEDURE — C1729 CATH, DRAINAGE: HCPCS | Performed by: COLON & RECTAL SURGERY

## 2017-11-15 PROCEDURE — 74011250636 HC RX REV CODE- 250/636: Performed by: NURSE ANESTHETIST, CERTIFIED REGISTERED

## 2017-11-15 PROCEDURE — 77030008565 HC TBNG SUC IRR ERBE -B: Performed by: COLON & RECTAL SURGERY

## 2017-11-15 PROCEDURE — 74011250636 HC RX REV CODE- 250/636

## 2017-11-15 PROCEDURE — 77030018846 HC SOL IRR STRL H20 ICUM -A: Performed by: COLON & RECTAL SURGERY

## 2017-11-15 PROCEDURE — 88305 TISSUE EXAM BY PATHOLOGIST: CPT | Performed by: COLON & RECTAL SURGERY

## 2017-11-15 PROCEDURE — 76040000019: Performed by: COLON & RECTAL SURGERY

## 2017-11-15 PROCEDURE — 76060000031 HC ANESTHESIA FIRST 0.5 HR: Performed by: COLON & RECTAL SURGERY

## 2017-11-15 PROCEDURE — 74011000250 HC RX REV CODE- 250

## 2017-11-15 RX ORDER — SODIUM CHLORIDE 0.9 % (FLUSH) 0.9 %
5-10 SYRINGE (ML) INJECTION EVERY 8 HOURS
Status: DISCONTINUED | OUTPATIENT
Start: 2017-11-15 | End: 2017-11-15 | Stop reason: HOSPADM

## 2017-11-15 RX ORDER — LIDOCAINE HYDROCHLORIDE 20 MG/ML
INJECTION, SOLUTION EPIDURAL; INFILTRATION; INTRACAUDAL; PERINEURAL AS NEEDED
Status: DISCONTINUED | OUTPATIENT
Start: 2017-11-15 | End: 2017-11-15 | Stop reason: HOSPADM

## 2017-11-15 RX ORDER — DEXTROSE 50 % IN WATER (D50W) INTRAVENOUS SYRINGE
25-50 AS NEEDED
Status: DISCONTINUED | OUTPATIENT
Start: 2017-11-15 | End: 2017-11-15 | Stop reason: HOSPADM

## 2017-11-15 RX ORDER — MAGNESIUM SULFATE 100 %
4 CRYSTALS MISCELLANEOUS AS NEEDED
Status: DISCONTINUED | OUTPATIENT
Start: 2017-11-15 | End: 2017-11-15 | Stop reason: HOSPADM

## 2017-11-15 RX ORDER — SODIUM CHLORIDE, SODIUM LACTATE, POTASSIUM CHLORIDE, CALCIUM CHLORIDE 600; 310; 30; 20 MG/100ML; MG/100ML; MG/100ML; MG/100ML
50 INJECTION, SOLUTION INTRAVENOUS CONTINUOUS
Status: DISCONTINUED | OUTPATIENT
Start: 2017-11-15 | End: 2017-11-15 | Stop reason: HOSPADM

## 2017-11-15 RX ORDER — PROPOFOL 10 MG/ML
INJECTION, EMULSION INTRAVENOUS AS NEEDED
Status: DISCONTINUED | OUTPATIENT
Start: 2017-11-15 | End: 2017-11-15 | Stop reason: HOSPADM

## 2017-11-15 RX ORDER — FENTANYL CITRATE 50 UG/ML
50 INJECTION, SOLUTION INTRAMUSCULAR; INTRAVENOUS AS NEEDED
Status: COMPLETED | OUTPATIENT
Start: 2017-11-15 | End: 2017-11-15

## 2017-11-15 RX ORDER — SODIUM CHLORIDE 0.9 % (FLUSH) 0.9 %
5-10 SYRINGE (ML) INJECTION AS NEEDED
Status: DISCONTINUED | OUTPATIENT
Start: 2017-11-15 | End: 2017-11-15 | Stop reason: HOSPADM

## 2017-11-15 RX ORDER — SODIUM CHLORIDE, SODIUM LACTATE, POTASSIUM CHLORIDE, CALCIUM CHLORIDE 600; 310; 30; 20 MG/100ML; MG/100ML; MG/100ML; MG/100ML
75 INJECTION, SOLUTION INTRAVENOUS CONTINUOUS
Status: DISCONTINUED | OUTPATIENT
Start: 2017-11-15 | End: 2017-11-15 | Stop reason: HOSPADM

## 2017-11-15 RX ORDER — FENTANYL CITRATE 50 UG/ML
INJECTION, SOLUTION INTRAMUSCULAR; INTRAVENOUS
Status: COMPLETED
Start: 2017-11-15 | End: 2017-11-15

## 2017-11-15 RX ORDER — LIDOCAINE HYDROCHLORIDE 10 MG/ML
0.1 INJECTION, SOLUTION EPIDURAL; INFILTRATION; INTRACAUDAL; PERINEURAL AS NEEDED
Status: DISCONTINUED | OUTPATIENT
Start: 2017-11-15 | End: 2017-11-15 | Stop reason: HOSPADM

## 2017-11-15 RX ORDER — ONDANSETRON 2 MG/ML
4 INJECTION INTRAMUSCULAR; INTRAVENOUS ONCE
Status: DISCONTINUED | OUTPATIENT
Start: 2017-11-15 | End: 2017-11-15 | Stop reason: HOSPADM

## 2017-11-15 RX ADMIN — PROPOFOL 100 MG: 10 INJECTION, EMULSION INTRAVENOUS at 15:24

## 2017-11-15 RX ADMIN — FENTANYL CITRATE 50 MCG: 50 INJECTION INTRAMUSCULAR; INTRAVENOUS at 16:45

## 2017-11-15 RX ADMIN — FENTANYL CITRATE 50 MCG: 50 INJECTION INTRAMUSCULAR; INTRAVENOUS at 16:00

## 2017-11-15 RX ADMIN — PROPOFOL 50 MG: 10 INJECTION, EMULSION INTRAVENOUS at 15:28

## 2017-11-15 RX ADMIN — LIDOCAINE HYDROCHLORIDE 40 MG: 20 INJECTION, SOLUTION EPIDURAL; INFILTRATION; INTRACAUDAL; PERINEURAL at 15:24

## 2017-11-15 RX ADMIN — PROPOFOL 50 MG: 10 INJECTION, EMULSION INTRAVENOUS at 15:38

## 2017-11-15 RX ADMIN — PROPOFOL 50 MG: 10 INJECTION, EMULSION INTRAVENOUS at 15:32

## 2017-11-15 RX ADMIN — SODIUM CHLORIDE, SODIUM LACTATE, POTASSIUM CHLORIDE, AND CALCIUM CHLORIDE: 600; 310; 30; 20 INJECTION, SOLUTION INTRAVENOUS at 15:22

## 2017-11-15 RX ADMIN — FENTANYL CITRATE 50 MCG: 50 INJECTION INTRAMUSCULAR; INTRAVENOUS at 16:20

## 2017-11-15 RX ADMIN — FENTANYL CITRATE 50 MCG: 50 INJECTION INTRAMUSCULAR; INTRAVENOUS at 16:41

## 2017-11-15 NOTE — ANESTHESIA POSTPROCEDURE EVALUATION
Post-Anesthesia Evaluation and Assessment    Patient: Champ Newman MRN: [id number]  SSN: [id number]    YOB: 1960  Age: 65 y.o. Sex: female       Cardiovascular Function/Vital Signs  Visit Vitals    /70    Pulse 62    Temp 36.7 °C (98 °F)    Resp 15    Ht 6' 1\" (1.854 m)    Wt (!) 188.2 kg (415 lb)    SpO2 100%    BMI 54.75 kg/m2       Patient is status post MAC anesthesia for Procedure(s):  COLONOSCOPY with polyp bx. Nausea/Vomiting: None    Postoperative hydration reviewed and adequate. Pain:  Pain Scale 1: Numeric (0 - 10) (11/15/17 1700)  Pain Intensity 1: 4 (11/15/17 1700)   Managed    Neurological Status: At baseline    Mental Status and Level of Consciousness: Arousable    Pulmonary Status:   O2 Device: Room air (11/15/17 1600)   Adequate oxygenation and airway patent    Complications related to anesthesia: None    Post-anesthesia assessment completed.  No concerns    Signed By: Cinthya Farris MD     November 15, 2017

## 2017-11-15 NOTE — PROCEDURES
Colonoscopy Procedure Note      [facility]  1960  [id number]                Date of Procedure: 11/15/2017    Indications:    Family history of coloretal cancer (screening only), Personal history of colon polyps (screening only)     Preoperative diagnosis: Colon cancer screening [Z12.11]      Postoperative diagnosis: diverticulosis, ascending colon polyp     Title of Procedure:  Colonoscopy with polypectomy    :  Mikael Pruett MD    Assistant(s): Endoscopy Technician-1: Meir Montague  Endoscopy RN-1: Rachael Menjivar RN; Marshall Kingston; Daniella Brennan RN    Referring Source:  Chavez Malcolm NP    Sedation:  MAC      ASA Class: ASA 3 - Severe systemic disease but compensated        Procedure Details:    Prior to the procedure, a history and physical were performed. The patients medications, allergies and sensitivities were reviewed and all questions were answered. After informed consent was obtained for the procedure, with all risks and benefits of procedure explained. The patient was taken to the endoscopy suite and placed in the left lateral decubitus position. Patient identification and proposed procedure were verified prior to the procedure by the nurse and I. Following the  satisfactory administration of sedation,  the anus was inspected and appeared within normal limits. Digital rectal examination revealed Normal sphincter tone and squeeze pressure. Palpation revealed No Masses. Next the Olympus video colonoscope was introduced through the anus and advanced to cecum, which was identified by the ileocecal valve and appendiceal orifice, terminal ileum. The quality of preparation was fair. The terminal ileum was visualized. The colonoscope was then slowly withdrawn and the mucosa carefully examined for any abnormalities. Cecal withdrawl time was greater than six minutes. The patient tolerated the procedure well.       Findings:   Rectum: normal  Sigmoid: moderate diverticulosis; Descending Colon: moderate diverticulosis;  Transverse Colon: normal  Ascending Colon: 1  Sessile polyp(s), the largest 3 mm in size;  Cecum: normal  Terminal Ileum: normal      Interventions:  1 complete polypectomy were performed using cold biopsy forceps and the polyps were  retrieved    Specimen Removed:   ID Type Source Tests Collected by Time Destination   1 : ascending polyp bx Preservative Colon, Ascending  Lorna Ramos MD 11/15/2017 1539 Pathology        Complications: None. EBL:  None. Impression:    diverticulosis, ascending colon polyp     Recommendations: -Await pathology. Resume normal medication(s). Discharge Disposition:  Home in the company of a  when able to ambulate.         Lorna Ramos MD, FACS, FASCRS  Colon and Rectal Surgery  [facility]  Office [phone]  Fax     [phone]  11/15/2017  3:57 PM       [facility]  [address] [address]  [place], [address] [place] [address].

## 2017-11-15 NOTE — ANESTHESIA PREPROCEDURE EVALUATION
Anesthetic History   No history of anesthetic complications            Review of Systems / Medical History  Patient summary reviewed and pertinent labs reviewed    Pulmonary  Within defined limits                 Neuro/Psych         Psychiatric history     Cardiovascular    Hypertension: well controlled        Dysrhythmias         Comments: Pt doesn't want Versed. OK with Propofol. GI/Hepatic/Renal           Liver disease     Endo/Other      Hypothyroidism: well controlled  Morbid obesity and arthritis     Other Findings   Comments:   Risk Factors for Postoperative nausea/vomiting:       History of postoperative nausea/vomiting? NO       Female? YES       Motion sickness? NO       Intended opioid administration for postoperative analgesia? NO      Smoking Abstinence  Current Smoker? NO  Elective Surgery? YES  Seen preoperatively by anesthesiologist or proxy prior to day of surgery? YES  Pt abstained from smoking 24 hours prior to anesthesia?  N/A           Physical Exam    Airway  Mallampati: II  TM Distance: 4 - 6 cm  Neck ROM: normal range of motion   Mouth opening: Normal     Cardiovascular               Dental  No notable dental hx       Pulmonary                 Abdominal  GI exam deferred       Other Findings            Anesthetic Plan    ASA: 3  Anesthesia type: MAC            Anesthetic plan and risks discussed with: Patient
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
## 2017-11-15 NOTE — IP AVS SNAPSHOT
Bonny Eisenberg 
 
 
 [address] [facility] Patient: Say Bee MRN: [id number] :1960 About your hospitalization You were admitted on:  November 15, 2017 You last received care in the:  [facility] You were discharged on:  November 15, 2017 Why you were hospitalized Your primary diagnosis was:  Not on File Your diagnoses also included:  Encounter For Screening Colonoscopy Things You Need To Do (next 8 weeks) Follow up with MD Dr Rosalina Rizo will contact you with results Phone:  [phone] Where:  [facility], [facility], [address] [address] Follow up with Kwabena Catherine NP Phone:  [phone] Where:  [facility], [address] [place] [address]  Hr [facility]atics F/U with Zina Kirby PT at  1:00 PM  
Where:  [facility])  PT MEDICARE F/U with Laveta Winburne May at  4:30 PM  
Where:  [facility])  Follow Up with Ramón Irizarry PA-C at 10:00 AM  
Where:  [address] [facility], [address] and [facility] [address] [facility] ([address])  ACUTE CARE with Kwabena Catherine NP at 10:30 AM  
Where:  [address] ([address]) PT MEDICARE F/U with Laveta Winburne May at  3:30 PM  
Where:  [facility])  PT MEDICARE F/U with Laveta Winburne May at 12:00 PM  
Where:  [facility] PT [facility])  PT MEDICARE F/U with Laveta Winburne May at 12:00 PM  
Where:  [facility]) Discharge Orders None A check darron indicates which time of day the medication should be taken. My Medications TAKE these medications as instructed Instructions Each Dose to Equal  
 Morning Noon Evening Bedtime Cholecalciferol (Vitamin D3) 50,000 unit Cap Your last dose was: Your next dose is: Take  by mouth Every Mon, Wed & Sun.  
     
   
   
   
  
 cinnamon bark (bulk) Powd Your last dose was: Your next dose is: Take 40 mg by mouth daily. 40 mg  
    
   
   
   
  
 clotrimazole-betamethasone topical cream  
Commonly known as:  Papito Elias Your last dose was: Your next dose is:    
   
   
 Apply  to affected area two (2) times a day. COQ10  PO Your last dose was: Your next dose is: Take 1 Tab by mouth daily. 1 Tab EPINEPHrine 0.3 mg/0.3 mL injection Commonly known as:  Rosa Gallego Your last dose was: Your next dose is: 0.3 mL by IntraMUSCular route once as needed for Anaphylaxis for up to 1 dose. Ok to dispense #2 if box contains 2 pens. 0.3 mg  
    
   
   
   
  
 esomeprazole 40 mg capsule Commonly known as:  NexIUM Your last dose was: Your next dose is: Take 1 Cap by mouth daily. Indications: Heartburn 40 mg FISH OIL 1,000 mg Cap Generic drug:  omega-3 fatty acids-vitamin e Your last dose was: Your next dose is: Take 1 Cap by mouth daily. 1 Cap  
    
   
   
   
  
 flaxseed oil 1,000 mg Cap Your last dose was: Your next dose is: Take 1,000 mg by mouth daily. 1000 mg  
    
   
   
   
  
 * HYDROcodone-acetaminophen 7.5-325 mg per tablet Commonly known as:  Benetta Robb Your last dose was: Your next dose is: Take 1 Tab by mouth every six (6) hours as needed. Max Daily Amount: 4 Tabs. 1 Tab  
    
   
   
   
  
 * HYDROcodone-acetaminophen 7.5-325 mg per tablet Commonly known as:  Mejia Minor Your last dose was: Your next dose is: Take 1 Tab by mouth every eight (8) hours as needed for Pain. Max Daily Amount: 3 Tabs. 1 Tab  
    
   
   
   
  
 ibuprofen 800 mg tablet Commonly known as:  MOTRIN Your last dose was: Your next dose is:    
   
   
      
   
   
   
  
 levothyroxine 50 mcg tablet Commonly known as:  SYNTHROID Your last dose was: Your next dose is: LORazepam 1 mg tablet Commonly known as:  ATIVAN Your last dose was: Your next dose is: Take 1 Tab by mouth daily as needed for Anxiety (Severe). 1 mg MAGNESIUM PO Your last dose was: Your next dose is: Take 400 mg by mouth daily. 400 mg  
    
   
   
   
  
 OTHER Your last dose was: Your next dose is:    
   
   
 Ground seaweed - 1 tsp. Daily. PEG 3350-Electrolytes 236-22.74-6.74 -5.86 gram suspension Commonly known as:  GO-LYTELY Your last dose was: Your next dose is: Take as directed  
     
   
   
   
  
 psyllium packet Commonly known as:  METAMUCIL Your last dose was: Your next dose is: Take 1 Packet by mouth daily. 1 Packet  
    
   
   
   
  
 traZODone 150 mg tablet Commonly known as:  Sundeep Kinney Your last dose was: Your next dose is: Take 1 Tab by mouth three (3) times daily. 150 mg  
    
   
   
   
  
 tretinoin 0.05 % topical cream  
Commonly known as:  RETIN-A Your last dose was: Your next dose is:    
   
   
 Apply to affected area at night time. TURMERIC (BULK) Your last dose was: Your next dose is: Take 40 mg by mouth daily. 40 mg  
    
   
   
   
  
 VITAMIN B COMPLEX PO Your last dose was: Your next dose is: Take 1 Cap by mouth daily. 1 Cap VITAMIN C 250 mg tablet Generic drug:  ascorbic acid (vitamin C) Your last dose was: Your next dose is: Take 250 mg by mouth daily. 250 mg  
    
   
   
   
  
 vitamin e 200 unit capsule Commonly known as:  [address] Your last dose was: Your next dose is: Take 200 Units by mouth daily. 200 Units * Notice: This list has 2 medication(s) that are the same as other medications prescribed for you. Read the directions carefully, and ask your doctor or other care provider to review them with you. Discharge Instructions Colonoscopy Discharge Instructions [facility] [facility] [id number] 
1960 COLONOSCOPY FINDINGS: 
Your colonoscopy showed: 1. One colon polyp which was completely removed. 2. Moderte diverticulosis of the left colon. FOLLOW UP RECOMMENDATIONS: 
 Dr. Edie Love will contact you with your results. Dr. Edie Love will recommend your next colonoscopy after the Pathology results are available. DISCOMFORT: 
If you have redness at your IV site- apply warm compress to area; if redness or soreness persist- contact your physician There may be a slight amount of blood passed from the rectum, more than a teaspoon of bright red blood is not expected - contact your physician Gaseous discomfort is common- walking, belching will help relieve any gas pains. If discomfort persist- contact your physician DIET: 
 High fiber diet. ACTIVITY: 
You may resume your normal daily activities, however, it is recommended that you spend the remainder of the day resting - avoiding any strenuous activities. You may not operate a vehicle for 24 hours You may not engage in an occupation involving machinery or appliances for rest of today You may not drink alcoholic beverages for at least 24 hours Avoid making any critical decisions for at least 24 hour CALL M.D. ANY SIGN OF: Increasing pain, nausea, vomiting Abdominal distension (swelling) New increased bleeding Fever or chills Pain in chest area or shortness of breath Darlene Peña MD, FACS, FASCRS Colon and Rectal Surgery [facility] Office [phone] Fax     [phone] DISCHARGE SUMMARY from Nurse PATIENT INSTRUCTIONS: 
 
 
F-face looks uneven A-arms unable to move or move unevenly S-speech slurred or non-existent T-time-call 911 as soon as signs and symptoms begin-DO NOT go Back to bed or wait to see if you get better-TIME IS BRAIN. Warning Signs of HEART ATTACK Call 911 if you have these symptoms: 
? Chest discomfort. Most heart attacks involve discomfort in the center of the chest that lasts more than a few minutes, or that goes away and comes back. It can feel like uncomfortable pressure, squeezing, fullness, or pain. ? Discomfort in other areas of the upper body. Symptoms can include pain or discomfort in one or both arms, the back, neck, jaw, or stomach. ? Shortness of breath with or without chest discomfort. ? Other signs may include breaking out in a cold sweat, nausea, or lightheadedness. Don't wait more than five minutes to call [address]! Fast action can save your life. Calling 911 is almost always the fastest way to get lifesaving treatment. Emergency Medical Services staff can begin treatment when they arrive  up to an hour sooner than if someone gets to the hospital by car. The discharge information has been reviewed with the patient.   The patient verbalized understanding. 
 
___________________________________________________________________________ ________________________________________________________ ACO Transitions of Care Introducing [facility] [address] offers a voluntary care coordination program to provide high quality service and care to Julissa Martinez fee-for-service beneficiaries. Yahir Harding was designed to help you enhance your health and well-being through the following services: ? Transitions of Care  support for individuals who are transitioning from one care setting to another (example: Hospital to home). ? Chronic and Complex Care Coordination  support for individuals and caregivers of those with serious or chronic illnesses or with more than one chronic (ongoing) condition and those who take a number of different medications. If you meet specific medical criteria, a [address] [place] [address] Rd may call you directly to coordinate your care with your primary care physician and your other care providers. For questions about the [facility] programs, please, contact your physicians office. For general questions or additional information about Accountable Care Organizations: 
Please visit www.medicare.gov/acos. html or call 1-800-MEDICARE (6-744.395.1877) TTY users should call 1-850.245.9909. Introducing [facility]! Dear Carlos Francois: Thank you for requesting a [employer] account. Our records indicate that you already have an active [employer] account. You can access your account anytime at [email or web address] Did you know that you can access your hospital and ER discharge instructions at any time in [employer] You? You can also review all of your test results from your hospital stay or ER visit. Additional Information If you have questions, please visit the Frequently Asked Questions section of the [employer] website at [email or web address]/. Remember, MyChart is NOT to be used for urgent needs. For medical emergencies, dial 911. Now available from your iPhone and Android! Providers Seen During Your Hospitalization Provider Specialty Primary office phone Pili Johnson MD Colon and Rectal Surgery [phone] Your Primary Care Physician (PCP) Primary Care Physician Office Phone Office Fax Judge Flores [phone] You are allergic to the following Allergen Reactions Oxycodone Anaphylaxis Hives Adhesive Tape-Silicones Rash Paper tape is okay to use. Celebrex (Celecoxib) Hives Contrast Dye (Iodine) Nausea Only Abdominal pain, dizziness Cortisone Nausea and Vomiting Flexeril (Cyclobenzaprine) Nausea and Vomiting Pt states also causes confusion Keflex (Cephalexin) Diarrhea Nausea Only Joint pain Recent Documentation Height Weight BMI OB Status Smoking Status 1.854 m (!) 188.2 kg 54.75 kg/m2 Having regular periods Never Smoker Emergency Contacts Name Discharge Info Relation Home Work Mobile [address] CAREGIVER [3] Friend [5] [phone] [facility] DISCHARGE CAREGIVER [3] Friend [5] [phone] [address] CAREGIVER [3] Brother [24] [phone] Patient Belongings The following personal items are in your possession at time of discharge: 
  Dental Appliances: None  Visual Aid: None Please provide this summary of care documentation to your next provider. Signatures-by signing, you are acknowledging that this After Visit Summary has been reviewed with you and you have received a copy. Patient Signature:  ____________________________________________________________ Date:  ____________________________________________________________  
  
Esequiel Ortez  Provider Signature: ____________________________________________________________ Date:  ____________________________________________________________

## 2017-11-15 NOTE — PERIOP NOTES
Patient discharged from facility via wheelchair. Patient has discharge instructions in hand. Patient armband removed and shredded.

## 2017-11-16 ENCOUNTER — HOSPITAL ENCOUNTER (OUTPATIENT)
Dept: PHYSICAL THERAPY | Age: 57
Discharge: HOME | End: 2017-11-16
Payer: MEDICARE

## 2017-11-16 PROCEDURE — 97113 AQUATIC THERAPY/EXERCISES: CPT

## 2017-11-16 RX ORDER — HYDROCODONE BITARTRATE AND ACETAMINOPHEN 7.5; 325 MG/1; MG/1
1 TABLET ORAL
Qty: 21 TAB | Refills: 0 | Status: SHIPPED | OUTPATIENT
Start: 2017-11-16 | End: 2017-12-06 | Stop reason: SDUPTHER

## 2017-11-16 NOTE — PROGRESS NOTES
PHYSICAL THERAPY - DAILY TREATMENT NOTE - AQUATICS    Patient Name: Segun Carrasco        Date: 2017  : 1960   YES Patient  Verified  Visit #:  3   of   10  Insurance: Payor: Ale Linker / Plan: [place] MED[place] & B / Product Type: Medicare /      In time: 100 Out time: 158   Total Treatment Time: 62     Medicare Time Tracking (below)   Total Timed Codes (min):  29 1:1 Treatment Time:  29     TREATMENT AREA =  Low back pain [M54.5]  Left knee pain [M25.562]    SUBJECTIVE    Pain Level (on 0 to 10 scale):  7/8 / 10   Medication Changes/New allergies or changes in medical history, any new surgeries or procedures? NO    If yes, update Summary List   Subjective Functional Status/Changes:  []  No changes reported     \"i had to lie down and rest the remainder of the day after last session. I am willing to try again\"         OBJECTIVE  29(58) min Therapeutic Exercise:   [x]  Aquatic Therapy   Rationale:      increase ROM, increase strength, improve coordination, proprioception and improve balance to improve the patients ability to perform ADL's and increase level of independence.      [x]   Patient Education:  Continue Aquatic Therapy and HEP as instructed     UE exercise resistance:                                                  LE exercises:                                                                 [x] none                                                                             []  thera-band resistance       [] small water weights                                                   [] no UE support       [] medium water weights                                              []  1 UE support        [] large water weights                                                   [x]  2 UE support          [] back supported on wall       [] thera-band      SLS UE support:                      [x] both UE                          [] 1 UE       [] 1 finger/fingers       []  none       [] EC Post Treatment Pain Level (on 0 to 10) scale:   9 / 10     Other  pt only able to do 50% of water walks and 25- 50% at LE aquatic program. Pt able to tolerate anneliese chi stand exs for ant/post and lateral E pushed with timed breathing       ASSESSMENT    Assessment/Changes in Function:   Pt is limited by LBP with standing and walking aquatics and takes considerable breaks and floats or flexes at pool side. pt does well with time breathing and responds to max cueing and demonstration. Pt cont to have high levels of chronic pain but is agreeable to attempting all of the program     []  See Progress Note/Recertification   Patient will continue to benefit from skilled PT services to modify and progress therapeutic interventions, address functional mobility deficits, address ROM deficits, address strength deficits, analyze and address soft tissue restrictions, analyze and cue movement patterns, analyze and modify body mechanics/ergonomics, assess and modify postural abnormalities and instruct in home and community integration to attain remaining goals. to attain remaining goals. Progress toward goals / Updated goals:  1. Pt will be safe and independent with HEP for participation in their rehabilitation.  No significant progress noted on second session of Aquatic PT     PLAN    []  Upgrade activities as tolerated YES Continue plan of care   []  Discharge due to :    []  Other:      Therapist: Marline Santiago PT    Date: 11/16/2017 Time: 11:35 AM   Future Appointments  Date Time Provider Siva Wright   11/16/2017 1:00 PM Susy Jones PT [facility]   11/20/2017 4:30 PM [facility]   11/21/2017 10:00 AM Conner Grande PA-C [facility] SCHED   11/22/2017 10:30 AM FARIDA Villagomez SCHED   11/22/2017 3:30 PM [facility]   11/27/2017 12:00 PM [facility]   11/30/2017 12:00 PM [facility]

## 2017-11-20 ENCOUNTER — APPOINTMENT (OUTPATIENT)
Dept: PHYSICAL THERAPY | Age: 57
End: 2017-11-20
Payer: MEDICARE

## 2017-11-20 ENCOUNTER — HOSPITAL ENCOUNTER (OUTPATIENT)
Dept: PHYSICAL THERAPY | Age: 57
Discharge: HOME | End: 2017-11-20
Payer: MEDICARE

## 2017-11-20 PROCEDURE — 97113 AQUATIC THERAPY/EXERCISES: CPT

## 2017-11-20 NOTE — PROGRESS NOTES
PHYSICAL THERAPY - DAILY TREATMENT NOTE - AQUATICS    Patient Name: Stuart Sanchez        Date: 2017  : 1960   YES Patient  Verified  Visit #:   4  of   10  Insurance: Payor: [address] [place] [address] / Plan: [place] MEDICARE PART A & B / Product Type: Medicare /      In time: 100 Out time: 150   Total Treatment Time: 50     Medicare Time Tracking (below)   Total Timed Codes (min):  1 grp 1:1 Treatment Time:  1 grp     TREATMENT AREA =  Low back pain [M54.5]  Left knee pain [M25.562]    SUBJECTIVE    Pain Level (on 0 to 10 scale):  7 / 10   Medication Changes/New allergies or changes in medical history, any new surgeries or procedures? NO    If yes, update Summary List   Subjective Functional Status/Changes:  []  No changes reported     \"i continue to have very high pain\"         OBJECTIVE  50 min Therapeutic Exercise:   [x]  Aquatic Therapy   Rationale:      increase ROM, increase strength, improve coordination, proprioception and improve balance to improve the patients ability to perform ADL's and increase level of independence.      [x]   Patient Education:  Continue Aquatic Therapy and HEP as instructed     UE exercise resistance:                                                  LE exercises:                                                                 [x] none                                                                             []  thera-band resistance       [] small water weights                                                   [] no UE support       [] medium water weights                                              []  1 UE support        [] large water weights                                                   [x]  2 UE support          [] back supported on wall       [] thera-band      SLS UE support:                      [] both UE                          [] 1 UE       [] 1 finger/fingers       []  none       [] EC     Post Treatment Pain Level (on 0 to 10) scale:     / 10 Other        ASSESSMENT    Assessment/Changes in Function:   Pt does not tolerate full laps for warm up or cool down. pt taking multiple rest breaks and performing trunk flexion with floatation at pool side. pt has very limited knee AROM with hamstring curls. []  See Progress Note/Recertification   Patient will continue to benefit from skilled PT services to modify and progress therapeutic interventions, address functional mobility deficits, address ROM deficits, address strength deficits, analyze and address soft tissue restrictions, analyze and cue movement patterns, analyze and modify body mechanics/ergonomics, assess and modify postural abnormalities and instruct in home and community integration to attain remaining goals. to attain remaining goals.    Progress toward goals / Updated goals:    Progressing with aquatic exs indep/ pts endurance and tolerance remains poor     PLAN    []  Upgrade activities as tolerated YES Continue plan of care   []  Discharge due to :    []  Other:      Therapist: Lindsey Lopez PT    Date: 11/20/2017 Time: 10:55 AM   Future Appointments  Date Time Provider Siva Wright   11/20/2017 1:00 PM Margo Karimi PT [facility]   11/21/2017 10:00 AM Buddy Buerger, PA-C [facility] SCHED   11/22/2017 10:30 AM Jerone Homans, NP [facility] SCHED   11/22/2017 3:30 PM Scarlett Peres [facility]   11/27/2017 2:00 PM Margo Karimi PT [facility]   11/30/2017 12:00 PM [facility]   12/4/2017 1:00 PM Margo Karimi PT [facility]

## 2017-11-21 ENCOUNTER — OFFICE VISIT (OUTPATIENT)
Dept: ORTHOPEDIC SURGERY | Facility: CLINIC | Age: 57
End: 2017-11-21

## 2017-11-21 VITALS
TEMPERATURE: 98.8 F | HEART RATE: 64 BPM | RESPIRATION RATE: 20 BRPM | DIASTOLIC BLOOD PRESSURE: 83 MMHG | OXYGEN SATURATION: 97 % | SYSTOLIC BLOOD PRESSURE: 146 MMHG

## 2017-11-21 DIAGNOSIS — S80.12XD CONTUSION OF LEFT KNEE AND LOWER LEG, SUBSEQUENT ENCOUNTER: ICD-10-CM

## 2017-11-21 DIAGNOSIS — S83.207A POSITIVE MCMURRAY SIGN (MENISCUS TEAR) OF LEFT KNEE, INITIAL ENCOUNTER: Primary | ICD-10-CM

## 2017-11-21 DIAGNOSIS — S20.221D: ICD-10-CM

## 2017-11-21 DIAGNOSIS — S80.02XD CONTUSION OF LEFT KNEE AND LOWER LEG, SUBSEQUENT ENCOUNTER: ICD-10-CM

## 2017-11-21 RX ORDER — IBUPROFEN 800 MG/1
800 TABLET ORAL
Qty: 90 TAB | Refills: 3 | Status: SHIPPED | OUTPATIENT
Start: 2017-11-21 | End: 2020-02-19 | Stop reason: SDUPTHER

## 2017-11-21 NOTE — PROGRESS NOTES
HISTORY OF PRESENT ILLNESS:  Ms. Nehal Howell returns to the office following for left knee pain. She is continuing aquatic therapy. She is still resulting in severe left knee pain. However, she has remnants of a contusion over the anteromedial surface. PHYSICAL EXAM:  On exam, she has a positive Madhu's sign. She has painful range of motion with full extension and flexion. The patient has no calf tenderness or evidence of DVT. Distal sensation is intact fully to the left lower extremity. PLAN:   She is going to continue physical therapy/occupational therapy. We are going to follow with an MRI of the left knee to rule out internal derangement. We will see her back following.

## 2017-11-21 NOTE — MR AVS SNAPSHOT
Visit Information Date & Time Provider Department Dept. Phone Encounter #  
 11/21/2017 10:00 AM Matilda Winter, [address] [facility] [phone] Your Appointments 11/22/2017 10:30 AM  
ACUTE CARE with FARIDA Baez [facility] Appt Note: Vaginal Discharge [address]. [address]  
  
   
 [address] Upcoming Health Maintenance Date Due  
 MEDICARE YEARLY EXAM 8/31/2018 BREAST CANCER SCRN MAMMOGRAM 8/1/2019 PAP AKA CERVICAL CYTOLOGY 8/9/2019 DTaP/Tdap/Td series (2 - Td) 10/29/2020 COLONOSCOPY 11/15/2022 Allergies as of 11/21/2017  Review Complete On: 11/21/2017 By: Matilda Winter PA-C Severity Noted Reaction Type Reactions Oxycodone High 10/09/2013    Anaphylaxis, Hives Adhesive Tape-silicones  16/99/8986    Rash Paper tape is okay to use. Celebrex [Celecoxib]  10/09/2013    Hives Contrast Dye [Iodine]  03/21/2016    Nausea Only Abdominal pain, dizziness Cortisone  08/15/2013    Nausea and Vomiting Flexeril [Cyclobenzaprine]  08/04/2013    Nausea and Vomiting Pt states also causes confusion Keflex [Cephalexin]  11/30/2016    Diarrhea, Nausea Only Joint pain Current Immunizations  Reviewed on 3/29/2017 Name Date Influenza Vaccine Split 10/24/2012, 10/17/2011, 10/29/2010 TDAP Vaccine 10/29/2010 Not reviewed this visit You Were Diagnosed With   
  
 Codes Comments Positive Madhu sign (meniscus tear) of left knee, initial encounter    -  Primary ICD-10-CM: Z41.474U ICD-9-CM: 687. 2 Vitals BP Pulse Temp Resp LMP SpO2  
 146/83 (BP 1 Location: Left arm, BP Patient Position: Sitting) 64 98.8 °F (37.1 °C) (Oral) 20 10/11/2017 97% OB Status Smoking Status Having regular periods Never Smoker Preferred Pharmacy Pharmacy Name Phone [facility] [address] [place] [phone] Your Updated Medication List  
  
   
This list is accurate as of: 11/21/17 10:53 AM.  Always use your most recent med list.  
  
  
  
  
 Cholecalciferol (Vitamin D3) 50,000 unit Cap Take  by mouth Every Mon, Wed & Sun.  
  
 cinnamon bark (bulk) Powd Take 40 mg by mouth daily. clotrimazole-betamethasone topical cream  
Commonly known as:  Sofia Allison Apply  to affected area two (2) times a day. COQ10  PO Take 1 Tab by mouth daily. EPINEPHrine 0.3 mg/0.3 mL injection Commonly known as:  EPIPEN  
0.3 mL by IntraMUSCular route once as needed for Anaphylaxis for up to 1 dose. Ok to dispense #2 if box contains 2 pens. esomeprazole 40 mg capsule Commonly known as:  NexIUM Take 1 Cap by mouth daily. Indications: Heartburn FISH OIL 1,000 mg Cap Generic drug:  omega-3 fatty acids-vitamin e Take 1 Cap by mouth daily. flaxseed oil 1,000 mg Cap Take 1,000 mg by mouth daily. * HYDROcodone-acetaminophen 7.5-325 mg per tablet Commonly known as:  Ary Mura Take 1 Tab by mouth every six (6) hours as needed. Max Daily Amount: 4 Tabs. * HYDROcodone-acetaminophen 7.5-325 mg per tablet Commonly known as:  Ary Mura Take 1 Tab by mouth every eight (8) hours as needed for Pain. Max Daily Amount: 3 Tabs. Indications: Pain  
  
 ibuprofen 800 mg tablet Commonly known as:  MOTRIN  
  
 levothyroxine 50 mcg tablet Commonly known as:  SYNTHROID  
  
 LORazepam 1 mg tablet Commonly known as:  ATIVAN Take 1 Tab by mouth daily as needed for Anxiety (Severe). MAGNESIUM PO Take 400 mg by mouth daily. OTHER Ground seaweed - 1 tsp. Daily. PEG 3350-Electrolytes 236-22.74-6.74 -5.86 gram suspension Commonly known as:  GO-LYTELY Take as directed  
  
 psyllium packet Commonly known as:  METAMUCIL Take 1 Packet by mouth daily. traZODone 150 mg tablet Commonly known as:  Tino Everett Take 1 Tab by mouth three (3) times daily. tretinoin 0.05 % topical cream  
Commonly known as:  RETIN-A Apply to affected area at night time. TURMERIC (BULK) Take 40 mg by mouth daily. VITAMIN B COMPLEX PO Take 1 Cap by mouth daily. VITAMIN C 250 mg tablet Generic drug:  ascorbic acid (vitamin C) Take 250 mg by mouth daily. vitamin e 200 unit capsule Commonly known as:  [address] Take 200 Units by mouth daily. * Notice: This list has 2 medication(s) that are the same as other medications prescribed for you. Read the directions carefully, and ask your doctor or other care provider to review them with you. To-Do List   
 11/21/2017 Imaging:  MRI KNEE LT WO CONT   
  
 11/22/2017 3:30 PM  
  Appointment with Ilana Peres at [facility] PT [address] ([phone])  
  
 11/27/2017 2:00 PM  
  Appointment with Sal Bah PT at [facility] PT [address] ([phone])  
  
 11/30/2017 12:00 PM  
  Appointment with Ilana Peres at [place] [address] ([phone]) 12/04/2017 1:00 PM  
  Appointment with Sal Bah PT at [facility] PT [address] ([phone]) [facility]! Dear Flora Irvin: Thank you for requesting a [employer] account. Our records indicate that you already have an active [employer] account. You can access your account anytime at [email or web address] Did you know that you can access your hospital and ER discharge instructions at any time in [employer]? You can also review all of your test results from your hospital stay or ER visit. Additional Information If you have questions, please visit the Frequently Asked Questions section of the [employer] website at [email or web address] Remember, [employer] is NOT to be used for urgent needs.  For medical emergencies, dial 911. Now available from your iPhone and Android! Please provide this summary of care documentation to your next provider. Your primary care clinician is listed as Moo Renteria. If you have any questions after today's visit, please call [phone].

## 2017-11-22 ENCOUNTER — HOSPITAL ENCOUNTER (OUTPATIENT)
Dept: PHYSICAL THERAPY | Age: 57
Discharge: HOME | End: 2017-11-22
Payer: MEDICARE

## 2017-11-22 ENCOUNTER — APPOINTMENT (OUTPATIENT)
Dept: PHYSICAL THERAPY | Age: 57
End: 2017-11-22
Payer: MEDICARE

## 2017-11-22 ENCOUNTER — OFFICE VISIT (OUTPATIENT)
Dept: FAMILY MEDICINE CLINIC | Age: 57
End: 2017-11-22

## 2017-11-22 VITALS
HEIGHT: 72 IN | SYSTOLIC BLOOD PRESSURE: 137 MMHG | RESPIRATION RATE: 16 BRPM | WEIGHT: 293 LBS | TEMPERATURE: 96 F | OXYGEN SATURATION: 94 % | DIASTOLIC BLOOD PRESSURE: 84 MMHG | HEART RATE: 66 BPM | BODY MASS INDEX: 39.68 KG/M2

## 2017-11-22 DIAGNOSIS — F32.89 OTHER DEPRESSION: ICD-10-CM

## 2017-11-22 DIAGNOSIS — F51.01 PRIMARY INSOMNIA: ICD-10-CM

## 2017-11-22 DIAGNOSIS — N76.0 BV (BACTERIAL VAGINOSIS): Primary | ICD-10-CM

## 2017-11-22 DIAGNOSIS — L73.9 FOLLICULITIS: ICD-10-CM

## 2017-11-22 DIAGNOSIS — I89.0 LYMPHEDEMA: ICD-10-CM

## 2017-11-22 DIAGNOSIS — B96.89 BV (BACTERIAL VAGINOSIS): Primary | ICD-10-CM

## 2017-11-22 PROCEDURE — 97110 THERAPEUTIC EXERCISES: CPT

## 2017-11-22 RX ORDER — METRONIDAZOLE 500 MG/1
500 TABLET ORAL 2 TIMES DAILY
Qty: 14 TAB | Refills: 0 | Status: SHIPPED | OUTPATIENT
Start: 2017-11-22 | End: 2017-11-29

## 2017-11-22 RX ORDER — TRAZODONE HYDROCHLORIDE 150 MG/1
150 TABLET ORAL 3 TIMES DAILY
Qty: 90 TAB | Refills: 0 | Status: SHIPPED | OUTPATIENT
Start: 2017-11-22 | End: 2018-02-15 | Stop reason: SDUPTHER

## 2017-11-22 RX ORDER — TRETINOIN 0.5 MG/G
CREAM TOPICAL
Qty: 45 G | Refills: 0 | Status: SHIPPED | OUTPATIENT
Start: 2017-11-22 | End: 2018-02-15 | Stop reason: SDUPTHER

## 2017-11-22 NOTE — PROGRESS NOTES
1. Have you been to the ER, urgent care clinic since your last visit? Hospitalized since your last visit? No    2. Have you seen or consulted any other health care providers outside of the [address] [place] [address] since your last visit? Include any pap smears or colon screening.  No
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
## 2017-11-22 NOTE — PATIENT INSTRUCTIONS
Bacterial Vaginosis: Care Instructions  Your Care Instructions    Bacterial vaginosis is a type of vaginal infection. It is caused by excess growth of certain bacteria that are normally found in the vagina. Symptoms can include itching, swelling, pain when you urinate or have sex, and a gray or yellow discharge with a \"fishy\" odor. It is not considered an infection that is spread through sexual contact. Although symptoms can be annoying and uncomfortable, bacterial vaginosis does not usually cause other health problems. However, if you have it while you are pregnant, it can cause complications. While the infection may go away on its own, most doctors use antibiotics to treat it. You may have been prescribed pills or vaginal cream. With treatment, bacterial vaginosis usually clears up in 5 to 7 days. Follow-up care is a key part of your treatment and safety. Be sure to make and go to all appointments, and call your doctor if you are having problems. It's also a good idea to know your test results and keep a list of the medicines you take. How can you care for yourself at home? · Take your antibiotics as directed. Do not stop taking them just because you feel better. You need to take the full course of antibiotics. · Do not eat or drink anything that contains alcohol if you are taking metronidazole (Flagyl). · Keep using your medicine if you start your period. Use pads instead of tampons while using a vaginal cream or suppository. Tampons can absorb the medicine. · Wear loose cotton clothing. Do not wear nylon and other materials that hold body heat and moisture close to the skin. · Do not scratch. Relieve itching with a cold pack or a cool bath. · Do not wash your vaginal area more than once a day. Use plain water or a mild, unscented soap. Do not douche. When should you call for help?   Watch closely for changes in your health, and be sure to contact your doctor if:  ? · You have unexpected vaginal bleeding. ? · You have a fever. ? · You have new or increased pain in your vagina or pelvis. ? · You are not getting better after 1 week. ? · Your symptoms return after you finish the course of your medicine. Where can you learn more? Go to http://flora-sarkis.info/. Melissa Urbano in the search box to learn more about \"Bacterial Vaginosis: Care Instructions. \"  Current as of: October 13, 2016  Content Version: 11.4  © 5654-0650 [employer]. Care instructions adapted under license by [employer] (which disclaims liability or warranty for this information). If you have questions about a medical condition or this instruction, always ask your healthcare professional. [address] any warranty or liability for your use of this information. Bacterial Vaginosis: Care Instructions  Your Care Instructions    Bacterial vaginosis is a type of vaginal infection. It is caused by excess growth of certain bacteria that are normally found in the vagina. Symptoms can include itching, swelling, pain when you urinate or have sex, and a gray or yellow discharge with a \"fishy\" odor. It is not considered an infection that is spread through sexual contact. Although symptoms can be annoying and uncomfortable, bacterial vaginosis does not usually cause other health problems. However, if you have it while you are pregnant, it can cause complications. While the infection may go away on its own, most doctors use antibiotics to treat it. You may have been prescribed pills or vaginal cream. With treatment, bacterial vaginosis usually clears up in 5 to 7 days. Follow-up care is a key part of your treatment and safety. Be sure to make and go to all appointments, and call your doctor if you are having problems. It's also a good idea to know your test results and keep a list of the medicines you take. How can you care for yourself at home?   · Take your antibiotics as directed. Do not stop taking them just because you feel better. You need to take the full course of antibiotics. · Do not eat or drink anything that contains alcohol if you are taking metronidazole (Flagyl). · Keep using your medicine if you start your period. Use pads instead of tampons while using a vaginal cream or suppository. Tampons can absorb the medicine. · Wear loose cotton clothing. Do not wear nylon and other materials that hold body heat and moisture close to the skin. · Do not scratch. Relieve itching with a cold pack or a cool bath. · Do not wash your vaginal area more than once a day. Use plain water or a mild, unscented soap. Do not douche. When should you call for help? Watch closely for changes in your health, and be sure to contact your doctor if:  ? · You have unexpected vaginal bleeding. ? · You have a fever. ? · You have new or increased pain in your vagina or pelvis. ? · You are not getting better after 1 week. ? · Your symptoms return after you finish the course of your medicine. Where can you learn more? Go to http://flora-sarkis.info/. Kiara Shannon in the search box to learn more about \"Bacterial Vaginosis: Care Instructions. \"  Current as of: October 13, 2016  Content Version: 11.4  © 7821-5279 [employer]. Care instructions adapted under license by [employer] (which disclaims liability or warranty for this information). If you have questions about a medical condition or this instruction, always ask your healthcare professional. [place] [address] any warranty or liability for your use of this information. Depression and Chronic Disease: Care Instructions  Your Care Instructions    A chronic disease is one that you have for a long time. Some chronic diseases can be controlled, but they usually cannot be cured. Depression is common in people with chronic diseases, but it often goes unnoticed.   Many people have concerns about seeking treatment for a mental health problem. You may think it's a sign of weakness, or you don't want people to know about it. It's important to overcome these reasons for not seeking treatment. Treating depression or anxiety is good for your health. Follow-up care is a key part of your treatment and safety. Be sure to make and go to all appointments, and call your doctor if you are having problems. It's also a good idea to know your test results and keep a list of the medicines you take. How can you care for yourself at home? Watch for symptoms of depression  The symptoms of depression are often subtle at first. You may think they are caused by your disease rather than depression. Or you may think it is normal to be depressed when you have a chronic disease. If you are depressed you may:  · Feel sad or hopeless. · Feel guilty or worthless. · Not enjoy the things you used to enjoy. · Feel hopeless, as though life is not worth living. · Have trouble thinking or remembering. · Have low energy, and you may not eat or sleep well. · Pull away from others. · Think often about death or killing yourself. (Keep the numbers for these national suicide hotlines: 5-790-696-TALK [1-135.111.1352] and 0-173-IAQPHWH [1-364.234.3491]. )  Get treatment  By treating your depression, you can feel more hopeful and have more energy. If you feel better, you may take better care of yourself, so your health may improve. · Talk to your doctor if you have any changes in mood during treatment for your disease. · Ask your doctor for help. Counseling, antidepressant medicine, or a combination of the two can help most people with depression. Often a combination works best. Counseling can also help you cope with having a chronic disease. When should you call for help? Call 911 anytime you think you may need emergency care. For example, call if:  ? · You feel like hurting yourself or someone else.    ? · Someone you know has depression and is about to attempt or is attempting suicide. ?Call your doctor now or seek immediate medical care if:  ? · You hear voices. ? · Someone you know has depression and:  ¨ Starts to give away his or her possessions. ¨ Uses illegal drugs or drinks alcohol heavily. ¨ Talks or writes about death, including writing suicide notes or talking about guns, knives, or pills. ¨ Starts to spend a lot of time alone. ¨ Acts very aggressively or suddenly appears calm. ? Watch closely for changes in your health, and be sure to contact your doctor if:  ? · You do not get better as expected. Where can you learn more? Go to http://flora-sarkis.info/. Enter R740 in the search box to learn more about \"Depression and Chronic Disease: Care Instructions. \"  Current as of: May 12, 2017  Content Version: 11.4  © 2754-9678 [employer]. Care instructions adapted under license by [employer] (which disclaims liability or warranty for this information). If you have questions about a medical condition or this instruction, always ask your healthcare professional. [address] any warranty or liability for your use of this information. Depression and Chronic Disease: Care Instructions  Your Care Instructions    A chronic disease is one that you have for a long time. Some chronic diseases can be controlled, but they usually cannot be cured. Depression is common in people with chronic diseases, but it often goes unnoticed. Many people have concerns about seeking treatment for a mental health problem. You may think it's a sign of weakness, or you don't want people to know about it. It's important to overcome these reasons for not seeking treatment. Treating depression or anxiety is good for your health. Follow-up care is a key part of your treatment and safety.  Be sure to make and go to all appointments, and call your doctor if you are having problems. It's also a good idea to know your test results and keep a list of the medicines you take. How can you care for yourself at home? Watch for symptoms of depression  The symptoms of depression are often subtle at first. You may think they are caused by your disease rather than depression. Or you may think it is normal to be depressed when you have a chronic disease. If you are depressed you may:  · Feel sad or hopeless. · Feel guilty or worthless. · Not enjoy the things you used to enjoy. · Feel hopeless, as though life is not worth living. · Have trouble thinking or remembering. · Have low energy, and you may not eat or sleep well. · Pull away from others. · Think often about death or killing yourself. (Keep the numbers for these national suicide hotlines: 7-384-665-TALK [1-413.219.2687] and 2-237-MILMFEQ [1-522.167.1412]. )  Get treatment  By treating your depression, you can feel more hopeful and have more energy. If you feel better, you may take better care of yourself, so your health may improve. · Talk to your doctor if you have any changes in mood during treatment for your disease. · Ask your doctor for help. Counseling, antidepressant medicine, or a combination of the two can help most people with depression. Often a combination works best. Counseling can also help you cope with having a chronic disease. When should you call for help? Call 911 anytime you think you may need emergency care. For example, call if:  ? · You feel like hurting yourself or someone else. ? · Someone you know has depression and is about to attempt or is attempting suicide. ?Call your doctor now or seek immediate medical care if:  ? · You hear voices. ? · Someone you know has depression and:  ¨ Starts to give away his or her possessions. ¨ Uses illegal drugs or drinks alcohol heavily. ¨ Talks or writes about death, including writing suicide notes or talking about guns, knives, or pills.   ¨ Starts to spend a lot of time alone. ¨ Acts very aggressively or suddenly appears calm. ? Watch closely for changes in your health, and be sure to contact your doctor if:  ? · You do not get better as expected. Where can you learn more? Go to http://flora-sarkis.info/. Enter H901 in the search box to learn more about \"Depression and Chronic Disease: Care Instructions. \"  Current as of: May 12, 2017  Content Version: 11.4  © 4447-1795 [employer]. Care instructions adapted under license by [employer] (which disclaims liability or warranty for this information). If you have questions about a medical condition or this instruction, always ask your healthcare professional. [address] any warranty or liability for your use of this information.

## 2017-11-22 NOTE — PROGRESS NOTES
PT DAILY TREATMENT NOTE 8-    Patient Name: Donald Bautista [facility]  Date:2017  : 1960  [x]  Patient  Verified  Payor: [place] MEDICARE / Plan: [place] MEDICARE PART A & B / Product Type: Medicare /    In time:1230  Out time:1315  Total Treatment Time (min): 45    Medicare/Medicaid Total Timed Codes (min): 45  1:1 Treatment Time (min): 45     Visit #: 5 of 10    Treatment Area: Low back pain [M54.5]  Left knee pain [M25.562]    SUBJECTIVE  Pain Level (0-10 scale): 8 back, 7 knee  Any medication changes, allergies to medications, adverse drug reactions, diagnosis change, or new procedure performed?: [x] No    [] Yes (see summary sheet for update)  Subjective functional status/changes:   [] No changes reported  Reports partial compliance with HEP \"as much as I can\". Requests privacy at [place] +    OBJECTIVE    45 min Therapeutic Exercise [x]  See Flowsheet    Rationale: increase ROM and increase strength to improve the patients ability to sup<>sit<>stand bed mobility and Xfers. Patient Education [x] Review HEP    [] Progressed/Changed HEP based on:   [] positioning   [] body mechanics     [] transfers      [] heat/ice application       Other Objective/Functional Measures:  Cont w/ signif verbalized pain responses, but does well with slow steady pressure to increase ROM    Pain Level (0-10 scale) post treatment: 8 back, 7 knee    ASSESSMENT/Changes in Function: Patient tolerated treatment fairly. Providing good effort. Still needs VC/TC'g to perform exercises correctly. Patient will continue to benefit from skilled PT services to modify and progress therapeutic interventions, address functional mobility deficits, address ROM deficits, address strength deficits, analyze and address soft tissue restrictions, analyze and cue movement patterns, analyze and modify body mechanics/ergonomics and instruct in home and community integration to attain remaining goals.      []  See Plan of Care  []  See Progress Note/ Recertification  []  See Discharge Summary         Progress towards goals / Updated goals:  STGs - 4 wks - 12/07/17   1. Pt will be safe and independent with HEP for participation in their rehabilitation. 2. Pt will demonstrate good posture and body mechanics to minimize pain and facilitate healing, IADLs, and functional mobility. 3. Pt will increase ROM to allow don/doff shoes with pain < 4/10. (50% pain decrease)- 11/22. Reached feet today but signif pain.      LTG - 12 wks - 2/01/17   1. Pt will be safe and independent with HEP principles for long-term self-care of their condition. 2. Pt will increase strength to 5/5 to facilitate holding good posture x 8 hr work day without increasing pain < 3/10.   3. Pt will score > 37 on FOTO functional status measure representing overall functional improvement.        PLAN  [x]  Continue plan of care  []  Upgrade activities as tolerated       []  Update interventions per flow sheet       []  Discharge due to:_  [x]  Other:_ treat with curtain closed or in room     Therapist:  Boone Barragan PT  Date:  11/22/2017  Time: 3:03 PM    Future Appointments  Date Time Provider Siva Wright   11/27/2017 2:00 PM Tianna Jovel PT [facility]   11/29/2017 2:45 PM [facility] RM 2 [facility]   11/30/2017 12:00 PM Lea Marquez [facility]   12/4/2017 1:00 PM Tianna Jovel PT [facility]   [facility]   12/11/2017 1:00 PM Jamison Tapia PT [facility]   12/12/2017 5:30 PM Lea Marquez [facility]   12/14/2017 1:00 PM Angie Liang PT [facility]   12/18/2017 1:00 PM Jamison Tapia PT [facility]   12/21/2017 1:00 PM Angie Liang PT [facility]   12/27/2017 11:30 AM Lea Marquez [facility]   12/29/2017 11:00 AM Sharri [facility]

## 2017-11-22 NOTE — MR AVS SNAPSHOT
Visit Information Date & Time Provider Department Dept. Phone Encounter #  
 11/22/2017 10:30 AM Panda Jara [id number] Follow-up Instructions Return if symptoms worsen or fail to improve. Upcoming Health Maintenance Date Due  
 MEDICARE YEARLY EXAM 8/31/2018 BREAST CANCER SCRN MAMMOGRAM 8/1/2019 PAP AKA CERVICAL CYTOLOGY 8/9/2019 DTaP/Tdap/Td series (2 - Td) 10/29/2020 COLONOSCOPY 11/15/2022 Allergies as of 11/22/2017  Review Complete On: 11/22/2017 By: Jeanie Cheung LPN Severity Noted Reaction Type Reactions Oxycodone High 10/09/2013    Anaphylaxis, Hives Adhesive Tape-silicones  50/93/3280    Rash Paper tape is okay to use. Celebrex [Celecoxib]  10/09/2013    Hives Contrast Dye [Iodine]  03/21/2016    Nausea Only Abdominal pain, dizziness Cortisone  08/15/2013    Nausea and Vomiting Flexeril [Cyclobenzaprine]  08/04/2013    Nausea and Vomiting Pt states also causes confusion Keflex [Cephalexin]  11/30/2016    Diarrhea, Nausea Only Joint pain Current Immunizations  Reviewed on 3/29/2017 Name Date Influenza Vaccine Split 10/24/2012, 10/17/2011, 10/29/2010 TDAP Vaccine 10/29/2010 Not reviewed this visit You Were Diagnosed With   
  
 Codes Comments BV (bacterial vaginosis)    -  Primary ICD-10-CM: N76.0, B96.89 
ICD-9-CM: 616.10, 041.9 Folliculitis     [facility]-AP: L73.9 ICD-9-CM: 704.8 Primary insomnia     ICD-10-CM: F51.01 
ICD-9-CM: 307.42 Other depression     ICD-10-CM: F32.89 ICD-9-CM: 946 Lymphedema     ICD-10-CM: I89.0 ICD-9-CM: 511.8 Vitals BP Pulse Temp Resp Height(growth percentile) Weight(growth percentile) 137/84 66 96 °F (35.6 °C) (Oral) 16 6' 1\" (1.854 m) (!) 418 lb (189.6 kg) LMP SpO2 BMI OB Status Smoking Status 11/09/2017 94% 55.15 kg/m2 Having regular periods Never Smoker BMI and BSA Data Body Mass Index Body Surface Area  
 55.15 kg/m 2 3.12 m 2 Preferred Pharmacy Pharmacy Name Phone [facility] [address] [place], [place] [address]Antwon Sears [phone] Your Updated Medication List  
  
   
This list is accurate as of: 11/22/17 11:46 AM.  Always use your most recent med list.  
  
  
  
  
 Cholecalciferol (Vitamin D3) 50,000 unit Cap Take  by mouth Every Mon, Wed & Sun.  
  
 cinnamon bark (bulk) Powd Take 40 mg by mouth daily. clotrimazole-betamethasone topical cream  
Commonly known as:  Kaleta Euler Apply  to affected area two (2) times a day. COQ10  PO Take 1 Tab by mouth daily. EPINEPHrine 0.3 mg/0.3 mL injection Commonly known as:  EPIPEN  
0.3 mL by IntraMUSCular route once as needed for Anaphylaxis for up to 1 dose. Ok to dispense #2 if box contains 2 pens. esomeprazole 40 mg capsule Commonly known as:  NexIUM Take 1 Cap by mouth daily. Indications: Heartburn FISH OIL 1,000 mg Cap Generic drug:  omega-3 fatty acids-vitamin e Take 1 Cap by mouth daily. flaxseed oil 1,000 mg Cap Take 1,000 mg by mouth daily. * HYDROcodone-acetaminophen 7.5-325 mg per tablet Commonly known as:  Viva Gifford Take 1 Tab by mouth every six (6) hours as needed. Max Daily Amount: 4 Tabs. * HYDROcodone-acetaminophen 7.5-325 mg per tablet Commonly known as:  Viva Gifford Take 1 Tab by mouth every eight (8) hours as needed for Pain. Max Daily Amount: 3 Tabs. Indications: Pain * ibuprofen 800 mg tablet Commonly known as:  MOTRIN  
  
 * ibuprofen 800 mg tablet Commonly known as:  MOTRIN Take 1 Tab by mouth three (3) times daily (with meals). Indications: Pain  
  
 levothyroxine 50 mcg tablet Commonly known as:  SYNTHROID  
  
 LORazepam 1 mg tablet Commonly known as:  ATIVAN Take 1 Tab by mouth daily as needed for Anxiety (Severe).   
  
 MAGNESIUM PO  
 Take 400 mg by mouth daily. metroNIDAZOLE 500 mg tablet Commonly known as:  FLAGYL Take 1 Tab by mouth two (2) times a day for 7 days. OTHER Ground seaweed - 1 tsp. Daily. psyllium packet Commonly known as:  METAMUCIL Take 1 Packet by mouth daily. traZODone 150 mg tablet Commonly known as:  Berniece Staggers Take 1 Tab by mouth three (3) times daily. tretinoin 0.05 % topical cream  
Commonly known as:  RETIN-A Apply to affected area at night time. TURMERIC (BULK) Take 40 mg by mouth daily. VITAMIN B COMPLEX PO Take 1 Cap by mouth daily. VITAMIN C 250 mg tablet Generic drug:  ascorbic acid (vitamin C) Take 250 mg by mouth daily. vitamin e 200 unit capsule Commonly known as:  [address] Take 200 Units by mouth daily. * Notice: This list has 4 medication(s) that are the same as other medications prescribed for you. Read the directions carefully, and ask your doctor or other care provider to review them with you. Prescriptions Sent to Pharmacy Refills  
 metroNIDAZOLE (FLAGYL) 500 mg tablet 0 Sig: Take 1 Tab by mouth two (2) times a day for 7 days. Class: Normal  
 Pharmacy: [facility] [address]. [address] Ph #: [phone] Route: Oral  
 tretinoin (RETIN-A) 0.05 % topical cream 0 Sig: Apply to affected area at night time. Class: Normal  
 Pharmacy: [facility] [address] [place] [address], [address] [place] [address]  AT . [address] Ph #: [phone]  
 traZODone (DESYREL) 150 mg tablet 0 Sig: Take 1 Tab by mouth three (3) times daily. Class: Normal  
 Pharmacy: [facility] [address] [place] [address], [address] [place] [address]. [address] Ph #: [phone] Route: Oral  
  
We Performed the Following REFERRAL TO PHYSICAL THERAPY [[facility] [place]] Comments: Please evaluate patient for land OT lymphatic manual massage Follow-up Instructions Return if symptoms worsen or fail to improve. To-Do List   
 11/22/2017 12:30 PM  
  Appointment with Mauricio Peres at [facility] [address] ([phone])  
  
 11/27/2017 2:00 PM  
  Appointment with Chloe Bhatia PT at [facility] [address] ([phone])  
  
 11/29/2017 2:45 PM  
  Appointment with [facility] MRI RM 2 at [address] [place] [address] ([phone]) GENERAL INSTRUCTIONS  Bring information (ID card) if you have any medically implanted devices. You will be required to lie still while the procedure is being performed. Remove any jewelry (including body piercing, hairpins) prior to MRI. If you have had a creatinine level drawn within the past 30 days, please bring most recent results to your appt. Bring any films, CD's, and reports related to your study with you on the day of your exam.  This only includes studies done outside of [address] [place] [address], [facility], [facility], and [facility]. Bring a complete list of all medications you are currently taking to include prescriptions, over-the-counter meds, herbals, vitamins & any dietary supplements. If you were given medications for claustrophobia or anxiety, please arrange to have someone drive you to your appointment. QUESTIONS  Notify the MRI Department if you have any questions concerning your study. Kristel Aguilar [phone]  
  
 11/30/2017 12:00 PM  
  Appointment with Mauricio Peres at [facility] [address] ([phone]) 12/04/2017 1:00 PM  
  Appointment with Chloe Bhatia PT at [facility] [address] ([phone]) Referral Information Referral ID Referred By Referred To  
  
 [id number] Judge Flores Not Available Visits Status Start Date End Date 1 New Request 11/22/17 11/22/18 If your referral has a status of pending review or denied, additional information will be sent to support the outcome of this decision. Patient Instructions Bacterial Vaginosis: Care Instructions Your Care Instructions Bacterial vaginosis is a type of vaginal infection. It is caused by excess growth of certain bacteria that are normally found in the vagina. Symptoms can include itching, swelling, pain when you urinate or have sex, and a gray or yellow discharge with a \"fishy\" odor. It is not considered an infection that is spread through sexual contact. Although symptoms can be annoying and uncomfortable, bacterial vaginosis does not usually cause other health problems. However, if you have it while you are pregnant, it can cause complications. While the infection may go away on its own, most doctors use antibiotics to treat it. You may have been prescribed pills or vaginal cream. With treatment, bacterial vaginosis usually clears up in 5 to 7 days. Follow-up care is a key part of your treatment and safety. Be sure to make and go to all appointments, and call your doctor if you are having problems. It's also a good idea to know your test results and keep a list of the medicines you take. How can you care for yourself at home? · Take your antibiotics as directed. Do not stop taking them just because you feel better. You need to take the full course of antibiotics. · Do not eat or drink anything that contains alcohol if you are taking metronidazole (Flagyl). · Keep using your medicine if you start your period. Use pads instead of tampons while using a vaginal cream or suppository. Tampons can absorb the medicine. · Wear loose cotton clothing. Do not wear nylon and other materials that hold body heat and moisture close to the skin. · Do not scratch. Relieve itching with a cold pack or a cool bath. · Do not wash your vaginal area more than once a day.  Use plain water or a mild, unscented soap. Do not douche. When should you call for help? Watch closely for changes in your health, and be sure to contact your doctor if: 
? · You have unexpected vaginal bleeding. ? · You have a fever. ? · You have new or increased pain in your vagina or pelvis. ? · You are not getting better after 1 week. ? · Your symptoms return after you finish the course of your medicine. Where can you learn more? Go to http://flora-sarkis.info/. Jae Vargas in the search box to learn more about \"Bacterial Vaginosis: Care Instructions. \" Current as of: October 13, 2016 Content Version: 11.4 © 6487-2169 Hooja. Care instructions adapted under license by [employer] (which disclaims liability or warranty for this information). If you have questions about a medical condition or this instruction, always ask your healthcare professional. [place] [address] any warranty or liability for your use of this information. Bacterial Vaginosis: Care Instructions Your Care Instructions Bacterial vaginosis is a type of vaginal infection. It is caused by excess growth of certain bacteria that are normally found in the vagina. Symptoms can include itching, swelling, pain when you urinate or have sex, and a gray or yellow discharge with a \"fishy\" odor. It is not considered an infection that is spread through sexual contact. Although symptoms can be annoying and uncomfortable, bacterial vaginosis does not usually cause other health problems. However, if you have it while you are pregnant, it can cause complications. While the infection may go away on its own, most doctors use antibiotics to treat it. You may have been prescribed pills or vaginal cream. With treatment, bacterial vaginosis usually clears up in 5 to 7 days. Follow-up care is a key part of your treatment and safety.  Be sure to make and go to all appointments, and call your doctor if you are having problems. It's also a good idea to know your test results and keep a list of the medicines you take. How can you care for yourself at home? · Take your antibiotics as directed. Do not stop taking them just because you feel better. You need to take the full course of antibiotics. · Do not eat or drink anything that contains alcohol if you are taking metronidazole (Flagyl). · Keep using your medicine if you start your period. Use pads instead of tampons while using a vaginal cream or suppository. Tampons can absorb the medicine. · Wear loose cotton clothing. Do not wear nylon and other materials that hold body heat and moisture close to the skin. · Do not scratch. Relieve itching with a cold pack or a cool bath. · Do not wash your vaginal area more than once a day. Use plain water or a mild, unscented soap. Do not douche. When should you call for help? Watch closely for changes in your health, and be sure to contact your doctor if: 
? · You have unexpected vaginal bleeding. ? · You have a fever. ? · You have new or increased pain in your vagina or pelvis. ? · You are not getting better after 1 week. ? · Your symptoms return after you finish the course of your medicine. Where can you learn more? Go to http://flora-sarkis.info/. Anuradha Chan in the search box to learn more about \"Bacterial Vaginosis: Care Instructions. \" Current as of: October 13, 2016 Content Version: 11.4 © 8668-2901 [employer]Sphere. Care instructions adapted under license by [employer] (which disclaims liability or warranty for this information). If you have questions about a medical condition or this instruction, always ask your healthcare professional. [place] [address] any warranty or liability for your use of this information. Depression and Chronic Disease: Care Instructions Your Care Instructions A chronic disease is one that you have for a long time. Some chronic diseases can be controlled, but they usually cannot be cured. Depression is common in people with chronic diseases, but it often goes unnoticed. Many people have concerns about seeking treatment for a mental health problem. You may think it's a sign of weakness, or you don't want people to know about it. It's important to overcome these reasons for not seeking treatment. Treating depression or anxiety is good for your health. Follow-up care is a key part of your treatment and safety. Be sure to make and go to all appointments, and call your doctor if you are having problems. It's also a good idea to know your test results and keep a list of the medicines you take. How can you care for yourself at home? Watch for symptoms of depression The symptoms of depression are often subtle at first. You may think they are caused by your disease rather than depression. Or you may think it is normal to be depressed when you have a chronic disease. If you are depressed you may: · Feel sad or hopeless. · Feel guilty or worthless. · Not enjoy the things you used to enjoy. · Feel hopeless, as though life is not worth living. · Have trouble thinking or remembering. · Have low energy, and you may not eat or sleep well. · Pull away from others. · Think often about death or killing yourself. (Keep the numbers for these national suicide hotlines: 0-799-338-TALK [1-193.676.1210] and 3-380-KAKSUUB [1-842.532.9220]. ) Get treatment By treating your depression, you can feel more hopeful and have more energy. If you feel better, you may take better care of yourself, so your health may improve. · Talk to your doctor if you have any changes in mood during treatment for your disease. · Ask your doctor for help. Counseling, antidepressant medicine, or a combination of the two can help most people with depression.  Often a combination works best. Counseling can also help you cope with having a chronic disease. When should you call for help? Call 911 anytime you think you may need emergency care. For example, call if: 
? · You feel like hurting yourself or someone else. ? · Someone you know has depression and is about to attempt or is attempting suicide. ?Call your doctor now or seek immediate medical care if: 
? · You hear voices. ? · Someone you know has depression and: 
¨ Starts to give away his or her possessions. ¨ Uses illegal drugs or drinks alcohol heavily. ¨ Talks or writes about death, including writing suicide notes or talking about guns, knives, or pills. ¨ Starts to spend a lot of time alone. ¨ Acts very aggressively or suddenly appears calm. ? Watch closely for changes in your health, and be sure to contact your doctor if: 
? · You do not get better as expected. Where can you learn more? Go to http://flora-sarkis.info/. Enter J722 in the search box to learn more about \"Depression and Chronic Disease: Care Instructions. \" Current as of: May 12, 2017 Content Version: 11.4 © 0851-8173 Zuse. Care instructions adapted under license by [employer] (which disclaims liability or warranty for this information). If you have questions about a medical condition or this instruction, always ask your healthcare professional. [address] any warranty or liability for your use of this information. Depression and Chronic Disease: Care Instructions Your Care Instructions A chronic disease is one that you have for a long time. Some chronic diseases can be controlled, but they usually cannot be cured. Depression is common in people with chronic diseases, but it often goes unnoticed. Many people have concerns about seeking treatment for a mental health problem.  You may think it's a sign of weakness, or you don't want people to know about it. It's important to overcome these reasons for not seeking treatment. Treating depression or anxiety is good for your health. Follow-up care is a key part of your treatment and safety. Be sure to make and go to all appointments, and call your doctor if you are having problems. It's also a good idea to know your test results and keep a list of the medicines you take. How can you care for yourself at home? Watch for symptoms of depression The symptoms of depression are often subtle at first. You may think they are caused by your disease rather than depression. Or you may think it is normal to be depressed when you have a chronic disease. If you are depressed you may: · Feel sad or hopeless. · Feel guilty or worthless. · Not enjoy the things you used to enjoy. · Feel hopeless, as though life is not worth living. · Have trouble thinking or remembering. · Have low energy, and you may not eat or sleep well. · Pull away from others. · Think often about death or killing yourself. (Keep the numbers for these national suicide hotlines: 1-680-546-TALK [1-651.888.8299] and 6-063-ZMPTNBR [1-651.144.2387]. ) Get treatment By treating your depression, you can feel more hopeful and have more energy. If you feel better, you may take better care of yourself, so your health may improve. · Talk to your doctor if you have any changes in mood during treatment for your disease. · Ask your doctor for help. Counseling, antidepressant medicine, or a combination of the two can help most people with depression. Often a combination works best. Counseling can also help you cope with having a chronic disease. When should you call for help? Call 911 anytime you think you may need emergency care. For example, call if: 
? · You feel like hurting yourself or someone else. ? · Someone you know has depression and is about to attempt or is attempting suicide. ?Call your doctor now or seek immediate medical care if: ? · You hear voices. ? · Someone you know has depression and: 
¨ Starts to give away his or her possessions. ¨ Uses illegal drugs or drinks alcohol heavily. ¨ Talks or writes about death, including writing suicide notes or talking about guns, knives, or pills. ¨ Starts to spend a lot of time alone. ¨ Acts very aggressively or suddenly appears calm. ? Watch closely for changes in your health, and be sure to contact your doctor if: 
? · You do not get better as expected. Where can you learn more? Go to http://flora-sarkis.info/. Enter B494 in the search box to learn more about \"Depression and Chronic Disease: Care Instructions. \" Current as of: May 12, 2017 Content Version: 11.4 © 6287-7559 Lumidigm. Care instructions adapted under license by [employer] (which disclaims liability or warranty for this information). If you have questions about a medical condition or this instruction, always ask your healthcare professional. [address] any warranty or liability for your use of this information. Introducing [facility]! Dear Elva Amaro: Thank you for requesting a Circular account. Our records indicate that you already have an active Circular account. You can access your account anytime at [email or web address]. [employer] Did you know that you can access your hospital and ER discharge instructions at any time in Circular? You can also review all of your test results from your hospital stay or ER visit. Additional Information If you have questions, please visit the Frequently Asked Questions section of the Circular website at [email or web address] [employer]/. Remember, Circular is NOT to be used for urgent needs. For medical emergencies, dial 911. Now available from your iPhone and Android! Please provide this summary of care documentation to your next provider. Your primary care clinician is listed as Sakina Mosqueda. If you have any questions after today's visit, please call [phone].

## 2017-11-27 ENCOUNTER — HOSPITAL ENCOUNTER (OUTPATIENT)
Dept: PHYSICAL THERAPY | Age: 57
Discharge: HOME | End: 2017-11-27
Payer: MEDICARE

## 2017-11-27 ENCOUNTER — APPOINTMENT (OUTPATIENT)
Dept: PHYSICAL THERAPY | Age: 57
End: 2017-11-27
Payer: MEDICARE

## 2017-11-27 PROCEDURE — 97113 AQUATIC THERAPY/EXERCISES: CPT

## 2017-11-29 ENCOUNTER — HOSPITAL ENCOUNTER (OUTPATIENT)
Age: 57
Discharge: HOME | End: 2017-11-29
Attending: PHYSICIAN ASSISTANT
Payer: MEDICARE

## 2017-11-29 DIAGNOSIS — S83.207A POSITIVE MCMURRAY SIGN (MENISCUS TEAR) OF LEFT KNEE, INITIAL ENCOUNTER: ICD-10-CM

## 2017-11-29 PROCEDURE — 73721 MRI JNT OF LWR EXTRE W/O DYE: CPT

## 2017-11-30 ENCOUNTER — HOSPITAL ENCOUNTER (OUTPATIENT)
Dept: PHYSICAL THERAPY | Age: 57
Discharge: HOME | End: 2017-11-30
Payer: MEDICARE

## 2017-11-30 PROCEDURE — 97110 THERAPEUTIC EXERCISES: CPT

## 2017-11-30 NOTE — PROGRESS NOTES
PT DAILY TREATMENT NOTE 8-    Patient Name: Abena Aguirre  Date:2017  : 1960  [x]  Patient  Verified  Payor: [place] MEDICARE / Plan: [place] MEDICARE PART A & B / Product Type: Medicare /    In time:1205  Out time:1300  Total Treatment Time (min): 55    Visit #: 7 of 10    Treatment Area: Low back pain [M54.5]  Left knee pain [M25.562]    SUBJECTIVE  Pain Level (0-10 scale): 7  Any medication changes, allergies to medications, adverse drug reactions, diagnosis change, or new procedure performed?: [x] No    [] Yes (see summary sheet for update)  Subjective functional status/changes:   [] No changes reported  \"Sorry I was so cranky last time. The chronic pain gets to me\"  While performing HS's: \"I can't it hurts my feet too much\"  While performing H/L shoulder flex: \"it makes my obliques cramp\"    OBJECTIVE      55 min Therapeutic Exercise [x]  See Flowsheet    Rationale: increase ROM, increase strength and improve balance to improve the patients ability to sit<>stand Xfer and improve functional mobility to safely ambulate over uneven terrain in the community. Patient Education [x] Review HEP    [] Progressed/Changed HEP based on:   [x] positioning   [x] body mechanics     [] transfers      [] heat/ice application       Other Objective/Functional Measures:  Patient requires TC/VC to perform therapeutic exercises correctly. Pain Level (0-10 scale) post treatment: 5    ASSESSMENT/Changes in Function: Patient tolerated treatment well. Providing good effort.      Patient will continue to benefit from skilled PT services to modify and progress therapeutic interventions, address functional mobility deficits, address ROM deficits, address strength deficits, analyze and address soft tissue restrictions, analyze and cue movement patterns, analyze and modify body mechanics/ergonomics, assess and modify postural abnormalities, address imbalance/dizziness and instruct in home and community integration to attain remaining goals. []  See Plan of Care  []  See Progress Note/ Recertification  []  See Discharge Summary         Progress towards goals / Updated goals:  STGs - 4 wks - 12/07/17   1. Pt will be safe and independent with HEP for participation in their rehabilitation. 11/30 Progressing  2. Pt will demonstrate good posture and body mechanics to minimize pain and facilitate healing, IADLs, and functional mobility. 3. Pt will increase ROM to allow don/doff shoes with pain < 4/10. (50% pain decrease)- 11/22. Reached feet today but signif pain.      LTG - 12 wks - 2/01/17   1. Pt will be safe and independent with HEP principles for long-term self-care of their condition. 2. Pt will increase strength to 5/5 to facilitate holding good posture x 8 hr work day without increasing pain < 3/10.   3. Pt will score > 37 on FOTO functional status measure representing overall functional improvement.        PLAN  [x]  Continue plan of care  []  Upgrade activities as tolerated       []  Update interventions per flow sheet       []  Discharge due to:_  []  Other:_      Therapist:  Scott Edwards PT  Date:  11/30/2017  Time: 12:07 PM    Future Appointments  Date Time Provider Siva Wright   12/4/2017 1:00 PM Jae New PT [facility]   12/8/2017 11:00 AM [address] [place] [address]   12/11/2017 1:00 PM Tati Sam PT [facility]   12/12/2017 5:30 PM Elisha [facility]   12/14/2017 1:00 PM Julia Rodriguez PT [facility]   12/18/2017 1:00 PM Tati Sam PT [facility]   12/21/2017 1:00 PM Julia Rodriguez PT [facility]   12/27/2017 11:30 AM Elisha [facility]   12/29/2017 11:00 AM Phoebe Luna [facility]

## 2017-12-04 ENCOUNTER — HOSPITAL ENCOUNTER (OUTPATIENT)
Dept: PHYSICAL THERAPY | Age: 57
Discharge: HOME | End: 2017-12-04
Payer: MEDICARE

## 2017-12-04 PROCEDURE — 97113 AQUATIC THERAPY/EXERCISES: CPT

## 2017-12-04 NOTE — PROGRESS NOTES
PHYSICAL THERAPY - DAILY TREATMENT NOTE - AQUATICS    Patient Name: Purvi Bates        Date: 2017  : 1960   YES Patient  Verified  Visit #:   8  of   10  Insurance: Payor: Luba Martinez / Plan: [place] MEDICARE PART A & B / Product Type: Medicare /      In time: 101 Out time: 155   Total Treatment Time: 54     Medicare Time Tracking (below)   Total Timed Codes (min):  50 1:1 Treatment Time:  50     TREATMENT AREA =  Low back pain [M54.5]  Left knee pain [M25.562]    SUBJECTIVE    Pain Level (on 0 to 10 scale):  8  / 10   Medication Changes/New allergies or changes in medical history, any new surgeries or procedures? NO    If yes, update Summary List   Subjective Functional Status/Changes:  []  No changes reported     \"i got my knee MRI and I have a torn ACL and meniscus\"         OBJECTIVE  54(50) min Therapeutic Exercise:   [x]  Aquatic Therapy   Rationale:      increase ROM, increase strength, improve coordination, proprioception and improve balance to improve the patients ability to perform ADL's and increase level of independence.      [x]   Patient Education:  Continue Aquatic Therapy and HEP as instructed     UE exercise resistance:                                                  LE exercises:                                                                 [] none                                                                             []  thera-band resistance       [x] small water weights                                                   [] no UE support       [] medium water weights                                              []  1 UE support        [] large water weights                                                   [x]  2 UE support          [] back supported on wall       [] thera-band      SLS UE support:                      [x] both UE                          [] 1 UE       [] 1 finger/fingers       []  none       [] EC     Post Treatment Pain Level (on 0 to 10) scale: 9  / 10     Other  pt cued to avoid piriformis stretching on left knee and modified soccer kicks exs with less rotation while leaning spine to ladder      ASSESSMENT    Assessment/Changes in Function:   Pt performing with less rest breaks and improved upright posture. Pt LE exs modified to avoid knee strain. Pt requires moderate cueing for there exs and modifications. Pt states she always has more pain the day of exs but the following day is improved and she feels she is making progress. []  See Progress Note/Recertification   Patient will continue to benefit from skilled PT services to modify and progress therapeutic interventions, address functional mobility deficits, address ROM deficits, address strength deficits, analyze and address soft tissue restrictions, analyze and cue movement patterns, analyze and modify body mechanics/ergonomics, assess and modify postural abnormalities, address imbalance/dizziness and instruct in home and community integration to attain remaining goals. to attain remaining goals. Progress toward goals / Updated goals:     1. Pt will be safe and independent with aquaticHEP principles for long-term self-care of their condition.        PLAN    []  Upgrade activities as tolerated YES Continue plan of care   []  Discharge due to :    []  Other:      Therapist: Destinee Barrios PT    Date: 12/4/2017 Time: 12:34 PM   Future Appointments  Date Time Provider Siva Wright   12/4/2017 1:00 PM Cintia Batista, PT [facility]   12/6/2017 1:20 PM EVIE Roy   12/7/2017 2:00 PM Cintia Batista PT [facility]   12/11/2017 1:00 PM Ethan Rogel PT [facility]   12/12/2017 5:30 PM Mook Adams [facility]   12/14/2017 1:00 PM Ritesh Andre PT [facility]   12/18/2017 1:00 PM Ethan Rogel PT [facility]   12/21/2017 1:00 PM Ritesh Andre PT [facility]   12/27/2017 11:30 AM Mook Adams [facility]   [facility]2017 [facility]

## 2017-12-06 ENCOUNTER — OFFICE VISIT (OUTPATIENT)
Dept: ORTHOPEDIC SURGERY | Facility: CLINIC | Age: 57
End: 2017-12-06

## 2017-12-06 VITALS
HEART RATE: 61 BPM | HEIGHT: 72 IN | OXYGEN SATURATION: 98 % | DIASTOLIC BLOOD PRESSURE: 81 MMHG | SYSTOLIC BLOOD PRESSURE: 144 MMHG | RESPIRATION RATE: 20 BRPM

## 2017-12-06 DIAGNOSIS — S20.221D: ICD-10-CM

## 2017-12-06 DIAGNOSIS — I89.0 LYMPHEDEMA: Primary | ICD-10-CM

## 2017-12-06 DIAGNOSIS — S80.12XA CONTUSION, KNEE AND LOWER LEG, LEFT, INITIAL ENCOUNTER: ICD-10-CM

## 2017-12-06 DIAGNOSIS — S80.12XD CONTUSION OF LEFT KNEE AND LOWER LEG, SUBSEQUENT ENCOUNTER: Primary | ICD-10-CM

## 2017-12-06 DIAGNOSIS — M23.301 MENISCUS, LATERAL, DERANGEMENT, LEFT: ICD-10-CM

## 2017-12-06 DIAGNOSIS — S80.02XA CONTUSION, KNEE AND LOWER LEG, LEFT, INITIAL ENCOUNTER: ICD-10-CM

## 2017-12-06 DIAGNOSIS — M23.92 INTERNAL DERANGEMENT OF LEFT KNEE: ICD-10-CM

## 2017-12-06 DIAGNOSIS — M17.12 PRIMARY OSTEOARTHRITIS OF LEFT KNEE: ICD-10-CM

## 2017-12-06 DIAGNOSIS — S80.02XD CONTUSION OF LEFT KNEE AND LOWER LEG, SUBSEQUENT ENCOUNTER: Primary | ICD-10-CM

## 2017-12-06 RX ORDER — HYDROCODONE BITARTRATE AND ACETAMINOPHEN 7.5; 325 MG/1; MG/1
1 TABLET ORAL
Qty: 21 TAB | Refills: 0 | Status: SHIPPED | OUTPATIENT
Start: 2017-12-06 | End: 2017-12-20 | Stop reason: SDUPTHER
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
## 2017-12-06 NOTE — MR AVS SNAPSHOT
Visit Information Date & Time Provider Department Dept. Phone Encounter #  
 12/6/2017  1:35 PM Toneharmeet Edy, [address] [facility] [place]  [phone] Upcoming Health Maintenance Date Due  
 MEDICARE YEARLY EXAM 8/31/2018 BREAST CANCER SCRN MAMMOGRAM 8/1/2019 PAP AKA CERVICAL CYTOLOGY 8/9/2019 DTaP/Tdap/Td series (2 - Td) 10/29/2020 COLONOSCOPY 11/15/2022 Allergies as of 12/6/2017  Review Complete On: 12/6/2017 By: Stephan Cortez LPN Severity Noted Reaction Type Reactions Oxycodone High 10/09/2013    Anaphylaxis, Hives Adhesive Tape-silicones  89/19/9441    Rash Paper tape is okay to use. Celebrex [Celecoxib]  10/09/2013    Hives Contrast Dye [Iodine]  03/21/2016    Nausea Only Abdominal pain, dizziness Cortisone  08/15/2013    Nausea and Vomiting Flexeril [Cyclobenzaprine]  08/04/2013    Nausea and Vomiting Pt states also causes confusion Keflex [Cephalexin]  11/30/2016    Diarrhea, Nausea Only Joint pain Current Immunizations  Reviewed on 3/29/2017 Name Date Influenza Vaccine Split 10/24/2012, 10/17/2011, 10/29/2010 TDAP Vaccine 10/29/2010 Not reviewed this visit You Were Diagnosed With   
  
 Codes Comments Contusion of left knee and lower leg, subsequent encounter    -  Primary ICD-10-CM: W96.02QZ, X74.43TX ICD-9-CM: V58.89, 924.10 Contusion, back, right, subsequent encounter     ICD-10-CM: S20.221D ICD-9-CM: V58.89, 922.31 Contusion, knee and lower leg, left, initial encounter     ICD-10-CM: S80.02XA, Z81.57DO ICD-9-CM: 924.10 Primary osteoarthritis of left knee     ICD-10-CM: M17.12 
ICD-9-CM: 715.16 Meniscus, lateral, derangement, left     ICD-10-CM: M23.301 ICD-9-CM: 717.40 Internal derangement of left knee     ICD-10-CM: M23.92 
ICD-9-CM: 717.9 Vitals BP Pulse Resp Height(growth percentile) LMP SpO2 144/81 (BP 1 Location: Left arm, BP Patient Position: Sitting) 61 20 6' 1\" (1.854 m) 11/09/2017 98% OB Status Smoking Status Having regular periods Never Smoker Preferred Pharmacy Pharmacy Name Phone [facility] [address] [place], [place] [address], [address] [place], [place] [address]Antwon Sears 136 [phone] Your Updated Medication List  
  
   
This list is accurate as of: 12/6/17  2:32 PM.  Always use your most recent med list.  
  
  
  
  
 Cholecalciferol (Vitamin D3) 50,000 unit Cap Take  by mouth Every Mon, Wed & Sun.  
  
 cinnamon bark (bulk) Powd Take 40 mg by mouth daily. clotrimazole-betamethasone topical cream  
Commonly known as:  Collene Bones Apply  to affected area two (2) times a day. COQ10  PO Take 1 Tab by mouth daily. EPINEPHrine 0.3 mg/0.3 mL injection Commonly known as:  EPIPEN  
0.3 mL by IntraMUSCular route once as needed for Anaphylaxis for up to 1 dose. Ok to dispense #2 if box contains 2 pens. esomeprazole 40 mg capsule Commonly known as:  NexIUM Take 1 Cap by mouth daily. Indications: Heartburn FISH OIL 1,000 mg Cap Generic drug:  omega-3 fatty acids-vitamin e Take 1 Cap by mouth daily. flaxseed oil 1,000 mg Cap Take 1,000 mg by mouth daily. * HYDROcodone-acetaminophen 7.5-325 mg per tablet Commonly known as:  [address] Take 1 Tab by mouth every six (6) hours as needed. Max Daily Amount: 4 Tabs. * HYDROcodone-acetaminophen 7.5-325 mg per tablet Commonly known as:  [address] Take 1 Tab by mouth every eight (8) hours as needed for Pain. Max Daily Amount: 3 Tabs. Indications: Pain * ibuprofen 800 mg tablet Commonly known as:  MOTRIN  
  
 * ibuprofen 800 mg tablet Commonly known as:  MOTRIN Take 1 Tab by mouth three (3) times daily (with meals). Indications: Pain  
  
 levothyroxine 50 mcg tablet Commonly known as:  SYNTHROID  
  
 LORazepam 1 mg tablet Commonly known as:  ATIVAN Take 1 Tab by mouth daily as needed for Anxiety (Severe). MAGNESIUM PO Take 400 mg by mouth daily. OTHER Ground seaweed - 1 tsp. Daily. psyllium packet Commonly known as:  METAMUCIL Take 1 Packet by mouth daily. traZODone 150 mg tablet Commonly known as:  [place] Take 1 Tab by mouth three (3) times daily. tretinoin 0.05 % topical cream  
Commonly known as:  RETIN-A Apply to affected area at night time. TURMERIC (BULK) Take 40 mg by mouth daily. VITAMIN B COMPLEX PO Take 1 Cap by mouth daily. VITAMIN C 250 mg tablet Generic drug:  ascorbic acid (vitamin C) Take 250 mg by mouth daily. vitamin e 200 unit capsule Commonly known as:  [address] Take 200 Units by mouth daily. * Notice: This list has 4 medication(s) that are the same as other medications prescribed for you. Read the directions carefully, and ask your doctor or other care provider to review them with you. Prescriptions Printed Refills HYDROcodone-acetaminophen (NORCO) 7.5-325 mg per tablet 0 Sig: Take 1 Tab by mouth every eight (8) hours as needed for Pain. Max Daily Amount: 3 Tabs. Indications: Pain Class: Print Route: Oral  
  
We Performed the Following REFERRAL TO PHYSICAL THERAPY [IEU00 Custom] Comments: To include land and aqua therapy  
evaluate and treat To-Do List   
 12/07/2017 10:30 AM  
  Appointment with Scarlett Prees at [place] [address] ([phone])  
  
 12/11/2017 1:00 PM  
  Appointment with León Knox PT at [facility] [address] ([phone])  
  
 12/12/2017 5:30 PM  
  Appointment with Scarlett Peres at [facility] [address] ([phone])  
  
 12/14/2017 1:00 PM  
  Appointment with Heath Dwyer PT at [facility] [address]  ([phone])  
  
 12/18/2017 1:00 PM  
  Appointment with León Knox PT at [facility] ([phone]) 12/21/2017 1:00 PM  
  Appointment with Gomez Bergeron, PT at [facility] [address]  ([phone])  
  
 12/22/2017 9:00 AM  
  Appointment with Carissa Rebolledo OT at [facility] ([phone])  
  
 12/27/2017 11:30 AM  
  Appointment with Johnnie Peres at [facility] [address] ([phone])  
  
 12/29/2017 11:00 AM  
  Appointment with Jakob Del Rosario at [facility] ([phone]) Referral Information Referral ID Referred By Referred To  
  
 [id number] Jessica Ramos Not Available Visits Status Start Date End Date 1 New Request 12/6/17 12/6/18 If your referral has a status of pending review or denied, additional information will be sent to support the outcome of this decision. Introducing [facility] & HEALTH SERVICES! Dear Stella Perez: Thank you for requesting a State account. Our records indicate that you already have an active State account. You can access your account anytime at [email or web address] Did you know that you can access your hospital and ER discharge instructions at any time in State? You can also review all of your test results from your hospital stay or ER visit. Additional Information If you have questions, please visit the Frequently Asked Questions section of the State website at [email or web address] Remember, State is NOT to be used for urgent needs. For medical emergencies, dial 911. Now available from your iPhone and Android! Please provide this summary of care documentation to your next provider. Your primary care clinician is listed as Marlen Bhatt. If you have any questions after today's visit, please call [phone].

## 2017-12-07 ENCOUNTER — HOSPITAL ENCOUNTER (OUTPATIENT)
Dept: PHYSICAL THERAPY | Age: 57
Discharge: HOME | End: 2017-12-07
Payer: MEDICARE

## 2017-12-07 ENCOUNTER — APPOINTMENT (OUTPATIENT)
Dept: PHYSICAL THERAPY | Age: 57
End: 2017-12-07
Payer: MEDICARE

## 2017-12-07 PROCEDURE — 97110 THERAPEUTIC EXERCISES: CPT

## 2017-12-07 NOTE — PROGRESS NOTES
PT DAILY TREATMENT NOTE 8-    Patient Name: Alma Rosa Snow  Date:2017  : 1960  [x]  Patient  Verified  Payor: VA MEDICARE / Plan: VA MEDICARE PART A & B / Product Type: Medicare /    In time:  Out time:1106  Total Treatment Time (min): 28    Medicare/Medicaid Total Timed Codes (min): 28  1:1 Treatment Time (min): 28     Visit #: 9 of 10    Treatment Area: Low back pain [M54.5]  Left knee pain [M25.562]    SUBJECTIVE  Pain Level (0-10 scale): 8  Any medication changes, allergies to medications, adverse drug reactions, diagnosis change, or new procedure performed?: [x] No    [] Yes (see summary sheet for update)  Subjective functional status/changes:   [] No changes reported  Patient reports compliance with HEP \"as best as I can\". States ortho told her + meniscal tears. States MD discussed bariatric surgery with her, she isn't interested. \"I asked him if he was a bariatric surgeon, when he said no, I said 'then why don't you stick to your specialty?'\". OBJECTIVE    28 min Therapeutic Exercise [x]  See Flowsheet    Rationale: increase ROM, increase strength and improve balance to improve the patients ability to perform sit<>stand transfers without pain increase or compensations. Patient Education [x] Review HEP    [] Progressed/Changed HEP based on:   [] positioning   [] body mechanics     [] transfers      [] heat/ice application     Other Objective/Functional Measures:  Patient requires TC/VC to perform therapeutic exercises correctly. Pain Level (0-10 scale) post treatment: 8    ASSESSMENT/Changes in Function: Patient tolerated treatment fairly. Appears to be providing good effort with facial expressions, nonverbal straining sounds, etc but her c/o pain to the point of needing to stop with certain movements, like heel slides, is a striking contrast to the strength required for her to ambulate at her weight.     Patient will continue to benefit from skilled PT services to modify and progress therapeutic interventions, address functional mobility deficits, address ROM deficits, address strength deficits, analyze and address soft tissue restrictions, analyze and cue movement patterns, analyze and modify body mechanics/ergonomics, assess and modify postural abnormalities, address imbalance/dizziness and instruct in home and community integration to attain remaining goals. []  See Plan of Care  []  See Progress Note/ Recertification  []  See Discharge Summary         Progress towards goals / Updated goals:  STGs - 4 wks - 12/07/17   1. Pt will be safe and independent with HEP for participation in their rehabilitation. 12/7 MET   2. Pt will demonstrate good posture and body mechanics to minimize pain and facilitate healing, IADLs, and functional mobility. 12/7 MET in tx today  3. Pt will increase ROM to allow don/doff shoes with pain < 4/10. (50% pain decrease)- 12/7 pt denies ability. Still wearing slippers held with duct tape.      LTG - 12 wks - 2/01/17   1. Pt will be safe and independent with HEP principles for long-term self-care of their condition. 2. Pt will increase strength to 5/5 to facilitate holding good posture x 8 hr work day without increasing pain < 3/10.   3. Pt will score > 37 on FOTO functional status measure representing overall functional improvement.        PLAN  [x]  Continue plan of care  []  Upgrade activities as tolerated       []  Update interventions per flow sheet       []  Discharge due to:_  []  Other:_      Therapist:  Silver Sheldon PT  Date:  12/7/2017  Time: 11:23 AM    Future Appointments  Date Time Provider Siva Wright   12/11/2017 1:00 PM Aaron Mauricio PT [facility]   12/12/2017 5:30 PM Steffanie Peres [facility]   12/14/2017 1:00 PM Benjamín Dasilva PT [facility]   12/18/2017 1:00 PM Aaron Mauricio PT [facility]   12/21/2017 1:00 PM Benjamín Dasilva PT [facility]   12/22/2017 9:00 AM Geremias Gongora OT [facility]   12/27/2017 11:30 AM Hazle [facility]rendra [facility]   [facility]/29[facility]2017 [facility]00  [facility]

## 2017-12-08 ENCOUNTER — APPOINTMENT (OUTPATIENT)
Dept: PHYSICAL THERAPY | Age: 57
End: 2017-12-08
Payer: MEDICARE

## 2017-12-11 ENCOUNTER — HOSPITAL ENCOUNTER (OUTPATIENT)
Dept: PHYSICAL THERAPY | Age: 57
Discharge: HOME | End: 2017-12-11
Payer: MEDICARE

## 2017-12-11 PROCEDURE — G8978 MOBILITY CURRENT STATUS: HCPCS

## 2017-12-11 PROCEDURE — G8979 MOBILITY GOAL STATUS: HCPCS

## 2017-12-11 PROCEDURE — 97113 AQUATIC THERAPY/EXERCISES: CPT

## 2017-12-11 NOTE — PROGRESS NOTES
[facility] PHYSICAL THERAPY  [address] [place] [address] Sravan Medrano, [employer] - Phone: [phone]  Fax: [phone] THERAPY          Patient Name: Joe Blackburn : 1960   Treatment/Medical Diagnosis: Low back pain [M54.5]  Left knee pain [M25.562]   Onset Date: DOI 10/28/17    Referral Source: Jeff Chang Start of Care [facility]): 17   Prior Hospitalization: See Medical History Provider #: [id number]   Prior Level of Function: Disability. Mod indep w/ walker, scooter, and self-care. Comorbidities: Morbid obesity, CLBP, previous R knee inj, HTN, depression, tachycardia, heart murmur, multi-jt OA, hiatal hernia,    Medications: Verified on Patient Summary List   Visits from [facility]: 10 Missed Visits: 0     Goal/Measure of Progress Goal Met? 1. Pt will score > 37 on FOTO functional status measure representing overall functional improvement. Status at last Eval: 19 Current Status: 18 no   2. Pt will increase strength to 5/5 to facilitate holding good posture x 8 hr work day without increasing pain < 3/10   Status at last Eval: 3+ Current Status: Requires rest break with posture in pool no   3. Pt will increase ROM to allow don/doff shoes with pain < 4/10. (50% pain decrease)   Status at last Eval: 8/10 Current Status: Pain levels continue ~8/10 no     Key Functional Changes/Progress: Pt has completed x10 sessions of PT including x6 aquatic sessions and  x4 land-based sessions. Overall pt reports subjective improvement with slight increases in strength and endurance however pt with decrease in FOTO score indicating decreased functional tolerance. However pt declined completing survey prior to session and requested completion post session.  Upon completion of survey pt stated \"My score went down but you have to realize I filled it out after exercising compared to the first visit when I hadn't done anything. \"  Pt continues to require rest breaks during aquatic session and demo limitations performing aquatic activity involving left knee flex. Problem List: pain affecting function, decrease ROM, decrease strength, edema affecting function, impaired gait/ balance, decrease ADL/ functional abilitiies, decrease activity tolerance, decrease flexibility/ joint mobility and decrease transfer abilities   Treatment Plan may include any combination of the following: Therapeutic exercise, Therapeutic activities, Neuromuscular re-education, Physical agent/modality, Gait/balance training, Manual therapy, Aquatic therapy, Patient education, Self Care training, Functional mobility training, Home safety training and Stair training  Patient Goal(s) has been updated and includes:  Same as [facility]    Goals for this certification period include and are to be achieved in   4  weeks:  1. Pt will be independent with HEP at D/C for self management. 2. Pt will increase FS FOTO Score to >/= 37  to indicate a significant decrease in functional disability. 3. Pt will increase flex and ext trunk strength to 4/5 for increased posture control during amb. 4. Pt will demo ability to allow don/doff shoes with pain < 4/10. (50% pain decrease)  Frequency / Duration:   Patient to be seen  2  times per week for   4    weeks:  G-Codes (GP): Mobility [id number] Current  CM= 80-99%   Goal  CL= 60-79%. The severity rating is based on the FOTO Score    Assessments/Recommendations: Pt would benefit from continued skilled PT to continue with strength and endurance gains in hopes to maximize strengthening and stability tolerance. If you have any questions/comments please contact us directly at [phone]. Thank you for allowing us to assist in the care of your patient. Therapist Signature:  Armando Byrne DPT Date: 21/19/9829   Certification Period:  Reporting Period: 11/9/17-2/8/17 11/9/17-12/11/17 Time: 3:31 PM   NOTE TO PHYSICIAN: PLEASE COMPLETE THE ORDERS BELOW AND FAX TO   [facility] Physical Therapy: [phone]. If you are unable to process this request in 24 hours please contact our office: [phone].    ___ I have read the above report and request that my patient continue as recommended.   ___ I have read the above report and request that my patient continue therapy with the following changes/special instructions: ________________________________________________   ___ I have read the above report and request that my patient be discharged from therapy.      Physician Signature:        Date:       Time:

## 2017-12-11 NOTE — PROGRESS NOTES
PHYSICAL THERAPY - DAILY TREATMENT NOTE - AQUATICS    Patient Name: Say Bee        Date: 2017  : 1960   YES Patient  Verified  Visit #:   10   of   10  Insurance: Payor: Delaney Dear / Plan: [place] MED[place] PART [place] & B / Product Type: Medicare /      In time: 1:05 Out time: 1:50   Total Treatment Time: 45     Medicare Time Tracking (below)   Total Timed Codes (min):  45 1:1 Treatment Time:  10     TREATMENT AREA =  LBP, L knee    SUBJECTIVE    Pain Level (on 0 to 10 scale):  7  / 10 knee, 5/10 back   Medication Changes/New allergies or changes in medical history, any new surgeries or procedures?     NO    If yes, update Summary List   Subjective Functional Status/Changes:  []  No changes reported     \"The back is a little less today\"          OBJECTIVE  45  (10) min Therapeutic Exercise:   [x]  Aquatic Therapy   Rationale:      increase ROM, increase strength, improve coordination and improve balance to improve the patients ability to amb and perform ADLs with increased ease      [x]   Patient Education:  Continue Aquatic Therapy and HEP as instructed     UE exercise resistance:                                                  LE exercises:                                                                 [] none                                                                             []  thera-band resistance       [x] small water weights                                                   [x] no UE support       [] medium water weights                                              [x]  1 UE support        [] large water weights                                                   [x]  2 UE support          [] back supported on wall       [] thera-band      SLS UE support:                      [] both UE                          [x] 1 UE       [] 1 finger/fingers       []  none       [] EC     Post Treatment Pain Level (on 0 to 10) scale:   8  / 10     Other  Educated pt on TA draw today and importance  Frequently limited with activities including knee flex (ex soccer kicks and tub steps)  Required rest breaks throughout to \"rest back\"       ASSESSMENT    Assessment/Changes in Function:     See MD note     []  See Progress Note/Recertification   Patient will continue to benefit from skilled PT services to modify and progress therapeutic interventions, address functional mobility deficits, address ROM deficits, address strength deficits, analyze and address soft tissue restrictions, analyze and cue movement patterns, analyze and modify body mechanics/ergonomics, assess and modify postural abnormalities, address imbalance/dizziness and instruct in home and community integration to attain remaining goals.    Progress toward goals / Updated goals:    See MD note     PLAN    []  Upgrade activities as tolerated YES Continue plan of care   []  Discharge due to :    []  Other:      Therapist: Liliana Weldon PT    Date: 12/11/2017 Time: 3:19 PM

## 2017-12-12 ENCOUNTER — HOSPITAL ENCOUNTER (OUTPATIENT)
Dept: PHYSICAL THERAPY | Age: 57
Discharge: HOME | End: 2017-12-12
Payer: MEDICARE

## 2017-12-12 PROCEDURE — 97110 THERAPEUTIC EXERCISES: CPT

## 2017-12-12 PROCEDURE — 97116 GAIT TRAINING THERAPY: CPT

## 2017-12-12 NOTE — PROGRESS NOTES
PT DAILY TREATMENT NOTE     Patient Name: Mary Jane Vargas [facility]  Date:2017  : 1960  [x]  Patient  Verified  Payor: [place] MEDICARE / Plan: [place] MEDICARE PART A & B / Product Type: Medicare /    In time:1720  Out time:1830  Total Treatment Time (min): 70    Medicare/Medicaid Total Timed Codes (min): 70  1:1 Treatment Time (min): 70     Visit #: 11 of 18    Treatment Area: Low back pain [M54.5]  Left knee pain [M25.562]    SUBJECTIVE  Pain Level (0-10 scale): 7  Any medication changes, allergies to medications, adverse drug reactions, diagnosis change, or new procedure performed?: [x] No    [] Yes (see summary sheet for update)  Subjective functional status/changes:   [] No changes reported  Patient reports compliance with HEP. OBJECTIVE    60 min Therapeutic Exercise [x]  See Flowsheet    Rationale: increase ROM and increase strength to improve the patients ability to perform sit<>stand transfers without pain increase or compensations. 10 min Gait Training [] Treadmill: Speed: ___. Grade: ___. Time: ___  [] Gym: Distance: ___   Dev: [] None [] SC [] QC [] AC  [] FC  [] SW [] RW   Assist:  [] None [] SB [] CG [] Min [] Mod [] Max   Rationale: decrease pain, increase ROM and increase postural awareness to improve functional mobility to safely ambulate over uneven terrain in the community. Patient Education [x] Review HEP    [] Progressed/Changed HEP based on:   [] positioning   [] body mechanics     [] transfers      [] heat/ice application       Other Objective/Functional Measures:  Patient requires TC/VC to perform therapeutic exercises correctly. Pain Level (0-10 scale) post treatment: 5 back. 8 knee. ASSESSMENT/Changes in Function: Patient tolerated treatment fairly. Providing good effort.      Patient will continue to benefit from skilled PT services to modify and progress therapeutic interventions, address functional mobility deficits, address ROM deficits, address strength deficits, analyze and address soft tissue restrictions, analyze and cue movement patterns, analyze and modify body mechanics/ergonomics, assess and modify postural abnormalities, address imbalance/dizziness and instruct in home and community integration to attain remaining goals. []  See Plan of Care  []  See Progress Note/ Recertification  []  See Discharge Summary         Progress towards goals / Updated goals:  STG- 4 wks - 1/08/18  1. Pt will be independent with HEP at D/C for self management. 2. Pt will increase FS FOTO Score to >/= 37  to indicate a significant decrease in functional disability. 3. Pt will increase flex and ext trunk strength to 4/5 for increased posture control during amb. 4. Pt will demo ability to allow don/doff shoes with pain < 4/10.  (50% pain decrease)    PLAN  [x]  Continue plan of care  []  Upgrade activities as tolerated       []  Update interventions per flow sheet       []  Discharge due to:_  []  Other:_      Therapist:  Jaz Estrada PT  Date:  12/12/2017  Time: 2:21 PM    Future Appointments  Date Time Provider Siva Wright   12/12/2017 5:30 PM [facility]   12/14/2017 1:00 PM Bobbi Pacheco PT [facility]   12/18/2017 1:00 PM Anand Wong PT [facility]   12/21/2017 1:00 PM Bobbi Pacheco PT [facility]   12/22/2017 9:00 AM Soco Scott OT [facility]   12/27/2017 11:30 AM Esthela Peres [facility]   12/29/2017 11:00 AM Alma Ortiz [facility]

## 2017-12-14 ENCOUNTER — HOSPITAL ENCOUNTER (OUTPATIENT)
Dept: PHYSICAL THERAPY | Age: 57
Discharge: HOME | End: 2017-12-14
Payer: MEDICARE

## 2017-12-14 PROCEDURE — 97113 AQUATIC THERAPY/EXERCISES: CPT

## 2017-12-14 NOTE — PROGRESS NOTES
PHYSICAL THERAPY - DAILY TREATMENT NOTE - AQUATICS    Patient Name: Coreen Ferrera        Date: 2017  : 1960   YES Patient  Verified  Visit #:     Insurance: Payor: Meir Cope / Plan: [place] MEDICARE PART A & B / Product Type: Medicare /      In time: 1:55 Out time: 2:55   Total Treatment Time: 60     Medicare Time Tracking (below)   Total Timed Codes (min):  60 1:1 Treatment Time:  n/a     TREATMENT AREA =  Low back pain [M54.5]  Left knee pain [M25.562]    SUBJECTIVE    Pain Level (on 0 to 10 scale):  7 (both)  / 10   Medication Changes/New allergies or changes in medical history, any new surgeries or procedures? NO    If yes, update Summary List   Subjective Functional Status/Changes:  []  No changes reported     \"Nothing really new since last time I was here. I just want to be stronger so I can have surgery. I do feel like I'm improving in my strength though. \"         OBJECTIVE  60 min Therapeutic Exercise:   [x]  Aquatic Therapy   Rationale:      increase ROM, increase strength, improve coordination,  and improve balance to improve the patients ability to perform ADL's and increase level of independence.      [x]   Patient Education:  Continue Aquatic Therapy and HEP as instructed     UE exercise resistance:                                                  LE exercises:                                                                 [] none                                                                             []  thera-band resistance       [x] small water weights                                                   [] no UE support       [] medium water weights                                              []  1 UE support        [] large water weights                                                   [x]  2 UE support          [] back supported on wall       [] thera-band      SLS UE support:                      [] both UE                          [] 1 UE       [] 1 finger/fingers       []  none       [] EC     Post Treatment Pain Level (on 0 to 10) scale:   7-8  / 10     Other  difficulty initiating rx initially due to [facility] patron not wanting to give room for patient to stand at appropriate height of water. Discussed with patron and  situation. Patroyal moved to ladder area reluctantly as she insisted that the space she took should not bother the other patients. ASSESSMENT    Assessment/Changes in Function:     Frequent rest periods given but able to partake in class   []  See Progress Note/Recertification   Patient will continue to benefit from skilled PT services to modify and progress therapeutic interventions, address functional mobility deficits, address ROM deficits, address strength deficits, analyze and address soft tissue restrictions, analyze and cue movement patterns, analyze and modify body mechanics/ergonomics and instruct in home and community integration to attain remaining goals. to attain remaining goals. Progress toward goals / Updated goals:    Improving in conditioning     PLAN    []  Upgrade activities as tolerated YES Continue plan of care   []  Discharge due to :    []  Other:      Therapist: Iftikhar Weems \"BJ\" SNEHA Borden, Cert. MDT, Cert.  DN    Date: 12/14/2017 Time: 1:50 PM   Future Appointments  Date Time Provider Siva Wright   12/14/2017 2:00 PM Leodan Quick, PT [facility]   12/18/2017 1:00 PM Ainsley Lemons PT [facility]   12/21/2017 1:00 PM Leodan Cons, PT [facility]   12/22/2017 9:00 AM James Aguirre OT [facility]   12/27/2017 3:30 PM Gaurav Peres [facility]   [facility]2017 [facility]

## 2017-12-18 ENCOUNTER — HOSPITAL ENCOUNTER (OUTPATIENT)
Dept: PHYSICAL THERAPY | Age: 57
Discharge: HOME | End: 2017-12-18
Payer: MEDICARE

## 2017-12-18 PROCEDURE — 97113 AQUATIC THERAPY/EXERCISES: CPT

## 2017-12-18 NOTE — PROGRESS NOTES
PHYSICAL THERAPY - DAILY TREATMENT NOTE    Patient Name: Viri Bull        Date: 2017  : 1960   YES Patient  Verified  Visit #:   15   of   18  Insurance: Payor: Natalie Breath / Plan: [place] MEDICARE PART A & B / Product Type: Medicare /      In time: 1:00 Out time: 1:45   Total Treatment Time: 45     Medicare Time Tracking (below)   Total Timed Codes (min):  45 1:1 Treatment Time:  10     TREATMENT AREA =  LBP, L knee    SUBJECTIVE    Pain Level (on 0 to 10 scale):  7  / 10   Medication Changes/New allergies or changes in medical history, any new surgeries or procedures? NO    If yes, update Summary List   Subjective Functional Status/Changes:  []  No changes reported     \"What is on the outside of the knee. That's what has been bothering me. \"          OBJECTIVE      45  (10) min Aquatic Therapeutic Exercise:  [x]  See flow sheet   Rationale:      increase ROM, increase strength and improve balance to improve the patients ability to amb and perform ADLs       min Patient Education:  YES  Reviewed HEP   []  Progressed/Changed HEP based on:   Cont HEP     Other Objective/Functional Measures:    Educated pt on anatomy of lateral knee   VC for hip abd and QS for knee control   Requires frequent rest breaks \"to relieve back\"     Post Treatment Pain Level (on 0 to 10) scale:   8  / 10     ASSESSMENT    Assessment/Changes in Function:     SLow progress     []  See Progress Note/Recertification   Patient will continue to benefit from skilled PT services to analyze,, cue,, progress,, modify,, demonstrate,, instruct, and address, movement patterns,, therapeutic interventions,, postural abnormalities,, soft tissue restrictions,, ROM,, strength,, functional mobility,, body mechanics/ergonomics, and home and community integration, to attain remaining goals. Progress toward goals / Updated goals:    · Goals for this certification period include and are to be achieved in   4  weeks:  1.  Pt will be independent with HEP at D/C for self management. 2. Pt will increase FS FOTO Score to >/= 37  to indicate a significant decrease in functional disability. 3. Pt will increase flex and ext trunk strength to 4/5 for increased posture control during amb. Strengthening per program  4. Pt will demo ability to allow don/doff shoes with pain < 4/10.  (50% pain decrease)     PLAN    []  Upgrade activities as tolerated YES Continue plan of care   []  Discharge due to :    []  Other:      Therapist: Millie Stallings DPT    Date: 12/18/2017 Time: 3:29 PM   Future Appointments  Date Time Provider Siva Wright   12/21/2017 1:00 PM Bobbi Pacheco PT [facility]   12/22/2017 9:00 AM Soco Scott OT [facility]   12/27/2017 3:30 PM Esthela Peres [facility]   [facility]2017 [facility]

## 2017-12-20 DIAGNOSIS — S80.02XD CONTUSION OF LEFT KNEE AND LOWER LEG, SUBSEQUENT ENCOUNTER: Primary | ICD-10-CM

## 2017-12-20 DIAGNOSIS — S80.12XD CONTUSION OF LEFT KNEE AND LOWER LEG, SUBSEQUENT ENCOUNTER: Primary | ICD-10-CM

## 2017-12-20 DIAGNOSIS — M15.9 PRIMARY OSTEOARTHRITIS INVOLVING MULTIPLE JOINTS: Primary | ICD-10-CM

## 2017-12-20 RX ORDER — HYDROCODONE BITARTRATE AND ACETAMINOPHEN 7.5; 325 MG/1; MG/1
1 TABLET ORAL
Qty: 21 TAB | Refills: 0 | Status: SHIPPED | OUTPATIENT
Start: 2017-12-20 | End: 2018-05-02 | Stop reason: SDUPTHER

## 2017-12-20 RX ORDER — HYDROCODONE BITARTRATE AND ACETAMINOPHEN 7.5; 325 MG/1; MG/1
1 TABLET ORAL
Qty: 21 TAB | Refills: 0 | Status: SHIPPED | OUTPATIENT
Start: 2017-12-20 | End: 2017-12-20 | Stop reason: SDUPTHER

## 2017-12-21 ENCOUNTER — HOSPITAL ENCOUNTER (OUTPATIENT)
Dept: PHYSICAL THERAPY | Age: 57
Discharge: HOME | End: 2017-12-21
Payer: MEDICARE

## 2017-12-21 PROCEDURE — 97113 AQUATIC THERAPY/EXERCISES: CPT

## 2017-12-21 NOTE — PROGRESS NOTES
PHYSICAL THERAPY - DAILY TREATMENT NOTE - AQUATICS    Patient Name: Jose Thomas        Date: 2017  : 1960   YES Patient  Verified  Visit #:     Insurance: Payor: Francoise Beans / Plan: [place] MEDICARE PART A & B / Product Type: Medicare /      In time: 1:00 Out time: 1:55   Total Treatment Time: 50     Medicare Time Tracking (below)   Total Timed Codes (min):  50 1:1 Treatment Time:  23     TREATMENT AREA =  Low back pain [M54.5]  Left knee pain [M25.562]    SUBJECTIVE    Pain Level (on 0 to 10 scale):  7  / 10   Medication Changes/New allergies or changes in medical history, any new surgeries or procedures? NO    If yes, update Summary List   Subjective Functional Status/Changes:  []  No changes reported     \"I'm always looking forward to the water. I'm sore when I'm done, but the next day I'm always feeling a whole lot better. I'd like to try to walk more in the water today than I usually do. I'm feeling up for it. \"         OBJECTIVE  50(23) min Therapeutic Exercise:   [x]  Aquatic Therapy   Rationale:      increase ROM, increase strength, improve coordination, and improve balance to improve the patients ability to perform ADL's and increase level of independence.      [x]   Patient Education:  Continue Aquatic Therapy and HEP as instructed     UE exercise resistance:                                                  LE exercises:                                                                 [] none                                                                             []  thera-band resistance       [x] small water weights                                                   [x] no UE support       [] medium water weights                                              []  1 UE support        [] large water weights                                                   []  2 UE support          [] back supported on wall       [] thera-band      SLS UE support: [x] both UE                          [] 1 UE       [] 1 finger/fingers       []  none       [] EC     Post Treatment Pain Level (on 0 to 10) scale:   8  / 10     Other  increased lap of walking       ASSESSMENT    Assessment/Changes in Function:     Improving well in conditioning as patient required less rest periods. []  See Progress Note/Recertification   Patient will continue to benefit from skilled PT services to modify and progress therapeutic interventions, address functional mobility deficits, address ROM deficits, address strength deficits, analyze and address soft tissue restrictions, analyze and cue movement patterns and instruct in home and community integration to attain remaining goals. to attain remaining goals. Progress toward goals / Updated goals:    Maintaining compliance in attendance and HEP     PLAN    []  Upgrade activities as tolerated YES Continue plan of care   []  Discharge due to :    []  Other:      Therapist: Troy Cobos \"WALE\" SNEHA Borden, Cert. MDT, Cert.  DN    Date: 12/21/2017 Time: 12:52 PM   Future Appointments  Date Time Provider Siva Wright   12/21/2017 1:00 PM Julia Rodriguez PT [facility]   12/22/2017 9:00 AM Kelby Quick, OT [facility]   12/27/2017 3:30 PM Elisha Peres [facility]   12/29/2017 11:00 AM Phoebe Luna [facility]

## 2017-12-22 ENCOUNTER — HOSPITAL ENCOUNTER (OUTPATIENT)
Dept: PHYSICAL THERAPY | Age: 57
Discharge: HOME | End: 2017-12-22
Payer: MEDICARE

## 2017-12-22 PROCEDURE — G8988 SELF CARE GOAL STATUS: HCPCS

## 2017-12-22 PROCEDURE — 97166 OT EVAL MOD COMPLEX 45 MIN: CPT

## 2017-12-22 PROCEDURE — G8987 SELF CARE CURRENT STATUS: HCPCS

## 2017-12-22 PROCEDURE — 97110 THERAPEUTIC EXERCISES: CPT
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
## 2017-12-27 ENCOUNTER — HOSPITAL ENCOUNTER (OUTPATIENT)
Dept: PHYSICAL THERAPY | Age: 57
Discharge: HOME | End: 2017-12-27
Payer: MEDICARE

## 2017-12-27 ENCOUNTER — APPOINTMENT (OUTPATIENT)
Dept: PHYSICAL THERAPY | Age: 57
End: 2017-12-27
Payer: MEDICARE

## 2017-12-27 PROCEDURE — 97116 GAIT TRAINING THERAPY: CPT

## 2017-12-27 PROCEDURE — 97110 THERAPEUTIC EXERCISES: CPT

## 2017-12-27 NOTE — PROGRESS NOTES
PT DAILY TREATMENT NOTE 8-    Patient Name: Jeromy Guerrero [facility] [place]  Date:2017  : 1960  [x]  Patient  Verified  Payor: Lazaro Gupta / Plan: [place] MEDICARE PART A & B / Product Type: Medicare /    In time:1530  Out time:1630  Total Treatment Time (min): 60    Medicare/Medicaid Total Timed Codes (min): 60  1:1 Treatment Time (min): 60     Visit #: 11 of 18    Treatment Area: Low back pain [M54.5]  Left knee pain [M25.562]    SUBJECTIVE  Pain Level (0-10 scale): 7  Any medication changes, allergies to medications, adverse drug reactions, diagnosis change, or new procedure performed?: [x] No    [] Yes (see summary sheet for update)  Subjective functional status/changes:   [] No changes reported  Patient reports compliance with HEP. OBJECTIVE    50 min Therapeutic Exercise [x]  See Flowsheet    Rationale: increase ROM and increase strength to improve the patients ability to perform sit<>stand transfers without pain increase or compensations. 10 min Gait Training [] Treadmill: Speed: ___. Grade: ___. Time: ___  [] Gym: Distance: ___   Dev: [] None [] SC [] QC [] AC  [] FC  [] SW [] RW   Assist:  [] None [] SB [] CG [] Min [] Mod [] Max   Rationale: decrease pain, increase ROM and increase postural awareness to improve functional mobility to safely ambulate over uneven terrain in the community. Patient Education [x] Review HEP    [] Progressed/Changed HEP based on:   [] positioning   [] body mechanics     [] transfers      [] heat/ice application       Other Objective/Functional Measures:  Patient requires TC/VC to perform therapeutic exercises correctly. Pain Level (0-10 scale) post treatment: 8    ASSESSMENT/Changes in Function: Patient tolerated treatment fairly. Providing good effort.      Patient will continue to benefit from skilled PT services to modify and progress therapeutic interventions, address functional mobility deficits, address ROM deficits, address strength deficits, analyze and address soft tissue restrictions, analyze and cue movement patterns, analyze and modify body mechanics/ergonomics, assess and modify postural abnormalities, address imbalance/dizziness and instruct in home and community integration to attain remaining goals. []  See Plan of Care  []  See Progress Note/ Recertification  []  See Discharge Summary         Progress towards goals / Updated goals:  STG- 4 wks - 1/08/18  1. Pt will be independent with HEP at D/C for self management. 2. Pt will increase FS FOTO Score to >/= 37  to indicate a significant decrease in functional disability. 3. Pt will increase flex and ext trunk strength to 4/5 for increased posture control during amb. progressing  4. Pt will demo ability to allow don/doff shoes with pain < 4/10.  (50% pain decrease) progressing    PLAN  [x]  Continue plan of care  []  Upgrade activities as tolerated       []  Update interventions per flow sheet       []  Discharge due to:_  []  Other:_      Therapist:  Micaela Conroy PT  Date:  12/27/2017  Time: 2:21 PM    Future Appointments  Date Time Provider Siva Wright   12/29/2017 11:00 AM [address] [place] [address]   1/2/2018 5:00 PM James Aguirre OT [facility]   1/9/2018 6:00 PM James Aguirre OT [facility]   1/11/2018 2:00 PM James Aguirre OT [facility]   1/16/2018 6:00 PM James Aguirre OT [facility]

## 2017-12-29 ENCOUNTER — APPOINTMENT (OUTPATIENT)
Dept: PHYSICAL THERAPY | Age: 57
End: 2017-12-29
Payer: MEDICARE

## 2018-01-02 ENCOUNTER — APPOINTMENT (OUTPATIENT)
Dept: PHYSICAL THERAPY | Age: 58
End: 2018-01-02

## 2018-01-04 ENCOUNTER — APPOINTMENT (OUTPATIENT)
Dept: PHYSICAL THERAPY | Age: 58
End: 2018-01-04
Payer: MEDICARE

## 2018-01-08 ENCOUNTER — APPOINTMENT (OUTPATIENT)
Dept: PHYSICAL THERAPY | Age: 58
End: 2018-01-08
Payer: MEDICARE

## 2018-01-09 ENCOUNTER — APPOINTMENT (OUTPATIENT)
Dept: PHYSICAL THERAPY | Age: 58
End: 2018-01-09

## 2018-01-10 ENCOUNTER — OFFICE VISIT (OUTPATIENT)
Dept: ORTHOPEDIC SURGERY | Facility: CLINIC | Age: 58
End: 2018-01-10

## 2018-01-10 ENCOUNTER — HOSPITAL ENCOUNTER (OUTPATIENT)
Dept: PHYSICAL THERAPY | Age: 58
Discharge: HOME | End: 2018-01-10
Payer: MEDICARE

## 2018-01-10 VITALS — SYSTOLIC BLOOD PRESSURE: 132 MMHG | DIASTOLIC BLOOD PRESSURE: 92 MMHG | HEART RATE: 71 BPM | OXYGEN SATURATION: 96 %

## 2018-01-10 DIAGNOSIS — S20.221D: ICD-10-CM

## 2018-01-10 DIAGNOSIS — M15.9 PRIMARY OSTEOARTHRITIS INVOLVING MULTIPLE JOINTS: ICD-10-CM

## 2018-01-10 DIAGNOSIS — S80.02XD CONTUSION OF LEFT KNEE AND LOWER LEG, SUBSEQUENT ENCOUNTER: Primary | ICD-10-CM

## 2018-01-10 DIAGNOSIS — S80.12XD CONTUSION OF LEFT KNEE AND LOWER LEG, SUBSEQUENT ENCOUNTER: Primary | ICD-10-CM

## 2018-01-10 PROBLEM — F33.9 RECURRENT DEPRESSION (HCC): Status: ACTIVE | Noted: 2018-01-10

## 2018-01-10 PROCEDURE — 97110 THERAPEUTIC EXERCISES: CPT

## 2018-01-10 PROCEDURE — 97116 GAIT TRAINING THERAPY: CPT

## 2018-01-10 RX ORDER — HYDROCODONE BITARTRATE AND ACETAMINOPHEN 7.5; 325 MG/1; MG/1
1 TABLET ORAL
Qty: 21 TAB | Refills: 0 | Status: SHIPPED | OUTPATIENT
Start: 2018-01-10 | End: 2018-02-05 | Stop reason: SDUPTHER

## 2018-01-10 NOTE — MR AVS SNAPSHOT
Visit Information Date & Time Provider Department Dept. Phone Encounter #  
 1/10/2018 11:15 AM Jhoan Valdez, [address] [facility] [place] [phone] Upcoming Health Maintenance Date Due  
 MEDICARE YEARLY EXAM 8/31/2018 BREAST CANCER SCRN MAMMOGRAM 8/1/2019 PAP AKA CERVICAL CYTOLOGY 8/9/2019 DTaP/Tdap/Td series (2 - Td) 10/29/2020 COLONOSCOPY 11/15/2022 Allergies as of 1/10/2018  Review Complete On: 1/10/2018 By: Gracy Barthel Severity Noted Reaction Type Reactions Oxycodone High 10/09/2013    Anaphylaxis, Hives Adhesive Tape-silicones  67/96/8841    Rash Paper tape is okay to use. Celebrex [Celecoxib]  10/09/2013    Hives Contrast Dye [Iodine]  03/21/2016    Nausea Only Abdominal pain, dizziness Cortisone  08/15/2013    Nausea and Vomiting Flexeril [Cyclobenzaprine]  08/04/2013    Nausea and Vomiting Pt states also causes confusion Keflex [Cephalexin]  11/30/2016    Diarrhea, Nausea Only Joint pain Current Immunizations  Reviewed on 3/29/2017 Name Date Influenza Vaccine Split 10/24/2012, 10/17/2011, 10/29/2010 TDAP Vaccine 10/29/2010 Not reviewed this visit You Were Diagnosed With   
  
 Codes Comments Contusion of left knee and lower leg, subsequent encounter    -  Primary ICD-10-CM: U19.39LY, B28.83UJ ICD-9-CM: V58.89, 924.10 Primary osteoarthritis involving multiple joints     ICD-10-CM: M15.0 ICD-9-CM: 715.09 Contusion, back, right, subsequent encounter     ICD-10-CM: S20.221D ICD-9-CM: V58.89, 922.31 Vitals BP Pulse SpO2 OB Status Smoking Status (!) 132/92 71 96% Having regular periods Never Smoker [facility] [address] [place] [address] [phone] Your Updated Medication List  
  
   
 This list is accurate as of: 1/10/18 11:54 AM.  Always use your most recent med list.  
  
  
  
  
 Cholecalciferol (Vitamin D3) 50,000 unit Cap Take  by mouth Every Mon, Wed & Sun.  
  
 cinnamon bark (bulk) Powd Take 40 mg by mouth daily. clotrimazole-betamethasone topical cream  
Commonly known as:  Weyman Abad Apply  to affected area two (2) times a day. COQ10  PO Take 1 Tab by mouth daily. EPINEPHrine 0.3 mg/0.3 mL injection Commonly known as:  EPIPEN  
0.3 mL by IntraMUSCular route once as needed for Anaphylaxis for up to 1 dose. Ok to dispense #2 if box contains 2 pens. esomeprazole 40 mg capsule Commonly known as:  NexIUM Take 1 Cap by mouth daily. Indications: Heartburn FISH OIL 1,000 mg Cap Generic drug:  omega-3 fatty acids-vitamin e Take 1 Cap by mouth daily. flaxseed oil 1,000 mg Cap Take 1,000 mg by mouth daily. * HYDROcodone-acetaminophen 7.5-325 mg per tablet Commonly known as:  Bianca Rodriguez Take 1 Tab by mouth every six (6) hours as needed. Max Daily Amount: 4 Tabs. * HYDROcodone-acetaminophen 7.5-325 mg per tablet Commonly known as:  Bianca Rodriguez Take 1 Tab by mouth every eight (8) hours as needed for Pain. Max Daily Amount: 3 Tabs. * HYDROcodone-acetaminophen 7.5-325 mg per tablet Commonly known as:  Bianca  Take 1 Tab by mouth every eight (8) hours as needed for Pain. Max Daily Amount: 3 Tabs. Indications: Pain * ibuprofen 800 mg tablet Commonly known as:  MOTRIN  
  
 * ibuprofen 800 mg tablet Commonly known as:  MOTRIN Take 1 Tab by mouth three (3) times daily (with meals). Indications: Pain  
  
 levothyroxine 50 mcg tablet Commonly known as:  SYNTHROID  
  
 LORazepam 1 mg tablet Commonly known as:  ATIVAN Take 1 Tab by mouth daily as needed for Anxiety (Severe). MAGNESIUM PO Take 400 mg by mouth daily. OTHER Ground seaweed - 1 tsp. Daily. psyllium packet Commonly known as:  METAMUCIL Take 1 Packet by mouth daily. traZODone 150 mg tablet Commonly known as:  Loletta Cj Take 1 Tab by mouth three (3) times daily. tretinoin 0.05 % topical cream  
Commonly known as:  RETIN-A Apply to affected area at night time. TURMERIC (BULK) Take 40 mg by mouth daily. VITAMIN B COMPLEX PO Take 1 Cap by mouth daily. VITAMIN C 250 mg tablet Generic drug:  ascorbic acid (vitamin C) Take 250 mg by mouth daily. vitamin e 200 unit capsule Commonly known as:  [address] Take 200 Units by mouth daily. * Notice: This list has 5 medication(s) that are the same as other medications prescribed for you. Read the directions carefully, and ask your doctor or other care provider to review them with you. Prescriptions Printed Refills HYDROcodone-acetaminophen (NORCO) 7.5-325 mg per tablet 0 Sig: Take 1 Tab by mouth every eight (8) hours as needed for Pain. Max Daily Amount: 3 Tabs. Indications: Pain Class: Print Route: Oral  
  
We Performed the Following REFERRAL TO PHYSICAL THERAPY [HDB12 Custom] Comments:  
 Continue PT as previously ordered.   
  
To-Do List   
 01/10/2018 3:30 PM  
  Appointment with Praveen Gracia May at [facility] ([phone])  
  
 01/11/2018 1:00 PM  
  Appointment with Praveen Gracia May at [facility] PT [address] ([phone])  
  
 01/15/2018 1:00 PM  
  Appointment with Praveen Gracia May at [facility] PT [address] ([phone])  
  
 01/18/2018 1:00 PM  
  Appointment with Praveen Gracia May at [facility] ([phone])  
  
 01/19/2018 10:00 AM  
  Appointment with Praveen Gracia May at [facility] PT [address] ([phone])  
  
 01/22/2018 1:00 PM  
  Appointment with Praveen Gracia May at [facility] PT [address] ([phone])  
  
 01/25/2018 1:00 PM  
  Appointment with Praveen Peres at [facility] ([phone])  
  
 01/26/2018 10:00 AM  
 Appointment with Maria Ines Jose May at [facility] PT [address] ([phone])  
  
 01/29/2018 1:00 PM  
  Appointment with Maria Ines Jose May at [facility] PT [address] ([phone])  
  
 01/30/2018 1:00 PM  
  Appointment with Maria Ines Jose May at [place] [address] ([phone]) Referral Information Referral ID Referred By Referred To  
  
 [id number] Emilie [facility] [address]   
   [address]   
   [address] Phone: [phone] Fax: [phone] Visits Status Start Date End Date 1 New Request 1/10/18 1/10/19 If your referral has a status of pending review or denied, additional information will be sent to support the outcome of this decision. Introducing [facility]! Dear Mana Steen: Thank you for requesting a [employer] account. Our records indicate that you already have an active [employer] account. You can access your account anytime at [email or web address] Health Integrated/"[email or web address] Did you know that you can access your hospital and ER discharge instructions at any time in [employer]? You can also review all of your test results from your hospital stay or ER visit. Additional Information If you have questions, please visit the Frequently Asked Questions section of the [employer] website at [email or web address] Health Integrated/"[email or web address]/. Remember, [employer] is NOT to be used for urgent needs. For medical emergencies, dial 911. Now available from your iPhone and Android! Please provide this summary of care documentation to your next provider. Your primary care clinician is listed as Gauri Patterson. If you have any questions after today's visit, please call [phone].

## 2018-01-11 ENCOUNTER — APPOINTMENT (OUTPATIENT)
Dept: PHYSICAL THERAPY | Age: 58
End: 2018-01-11

## 2018-01-11 ENCOUNTER — HOSPITAL ENCOUNTER (OUTPATIENT)
Dept: PHYSICAL THERAPY | Age: 58
Discharge: HOME | End: 2018-01-11
Payer: MEDICARE

## 2018-01-11 PROCEDURE — 97113 AQUATIC THERAPY/EXERCISES: CPT

## 2018-01-11 NOTE — PROGRESS NOTES
PT DAILY TREATMENT NOTE 8-    Patient Name: Kale Alvarez [facility]  Date:2018  : 1960  [x]  Patient  Verified  Payor: [place] MEDICARE / Plan: [place] MEDICARE PART A & B / Product Type: Medicare /    In time:1305  Out time:1355  Total Treatment Time (min): 55    Medicare/Medicaid Total Timed Codes (min): 25  1:1 Treatment Time (min): 25     Visit #: 17 of 18    Treatment Area: Low back pain [M54.5]  Left knee pain [M25.562]    SUBJECTIVE  Pain Level (0-10 scale): 7 knee and back  Any medication changes, allergies to medications, adverse drug reactions, diagnosis change, or new procedure performed?: [x] No    [] Yes (see summary sheet for update)  Subjective functional status/changes:   [] No changes reported  Patient reports compliance with HEP. OBJECTIVE    (25)  50 min Therapeutic Exercise [x]  See Flowsheet    Rationale: increase ROM, increase strength, improve coordination and improve balance to improve the patients ability to maintain neutral spinal stability while moving / performing ADLs to minimize/prevent pain increase or radicular sxs and improve functional mobility to safely ambulate over uneven terrain in the community.         Patient Education [x] Review HEP    [] Progressed/Changed HEP based on:   [] positioning   [] body mechanics     [] transfers      [] heat/ice application       Other Objective/Functional Measures:    UE exercise resistance:                                                  LE exercises:                                                                 [] none                                                                             []  thera-band resistance       [x] small water weights                                                   [] no UE support       [] medium water weights                                              [x]  1 UE support        [] large water weights                                                   []  2 UE support          [] back supported on wall       [] thera-band      SLS UE support:                      [] both UE                          [x] 1 UE       [] 1 finger/fingers       []  none       [] EC    Pain Level (0-10 scale) post treatment: 0 knee, 9 back    ASSESSMENT/Changes in Function: Patient tolerated treatment well for knee, poor for back. Providing good effort. Recommend less effort with trunk activities NV. Patient will continue to benefit from skilled PT services to modify and progress therapeutic interventions, address functional mobility deficits, address ROM deficits, address strength deficits, analyze and address soft tissue restrictions, analyze and cue movement patterns, analyze and modify body mechanics/ergonomics, assess and modify postural abnormalities, address imbalance/dizziness and instruct in home and community integration to attain remaining goals. []  See Plan of Care  []  See Progress Note/ Recertification  []  See Discharge Summary         Progress towards goals / Updated goals:  STG- 4 wks - 1/08/18  1. Pt will be independent with HEP at D/C for self management. 2. Pt will increase FS FOTO Score to >/= 37  to indicate a significant decrease in functional disability. 3. Pt will increase flex and ext trunk strength to 4/5 for increased posture control during amb. progressing  4. Pt will demo ability to allow don/doff shoes with pain < 4/10.  (50% pain decrease) progressing       PLAN  [x]  Continue plan of care   []  Upgrade activities as tolerated       []  Update interventions per flow sheet       []  Discharge due to:_  [x]  Other:_  Reassess/ Prog report next land visit    Therapist:  Alyssa Green PT  Date:  1/11/2018  Time: 2:53 PM    Future Appointments  Date Time Provider Siva Wright   [facility]2018 [facility]   1/15/2018 1:00 PM Praveen Gracia [facility]   1/18/2018 1:00 PM Praveen Gracia [facility]   1/19/2018 10:00 AM Praveen Gracia [facility]   1/22/2018 1:00 PM Praveen Gracia [facility]   1/25/2018 1:00 PM Critical access hospital   1/26/2018 10:00 AM Critical access hospital   1/29/2018 1:00 PM Critical access hospital   1/30/2018 1:00 PM Critical access hospital

## 2018-01-12 ENCOUNTER — HOSPITAL ENCOUNTER (OUTPATIENT)
Dept: LAB | Age: 58
Discharge: HOME | End: 2018-01-12
Payer: MEDICARE

## 2018-01-12 ENCOUNTER — APPOINTMENT (OUTPATIENT)
Dept: PHYSICAL THERAPY | Age: 58
End: 2018-01-12
Payer: MEDICARE

## 2018-01-12 DIAGNOSIS — E04.2 NONTOXIC MULTINODULAR GOITER: ICD-10-CM

## 2018-01-12 LAB
T4 FREE SERPL-MCNC: 1.1 NG/DL (ref 0.7–1.5)
TSH SERPL DL<=0.05 MIU/L-ACNC: 2.19 UIU/ML (ref 0.36–3.74)

## 2018-01-12 PROCEDURE — 84439 ASSAY OF FREE THYROXINE: CPT | Performed by: INTERNAL MEDICINE

## 2018-01-12 PROCEDURE — 84443 ASSAY THYROID STIM HORMONE: CPT | Performed by: INTERNAL MEDICINE

## 2018-01-12 PROCEDURE — 36415 COLL VENOUS BLD VENIPUNCTURE: CPT | Performed by: INTERNAL MEDICINE

## 2018-01-15 ENCOUNTER — APPOINTMENT (OUTPATIENT)
Dept: PHYSICAL THERAPY | Age: 58
End: 2018-01-15
Payer: MEDICARE

## 2018-01-16 ENCOUNTER — HOSPITAL ENCOUNTER (OUTPATIENT)
Dept: PHYSICAL THERAPY | Age: 58
Discharge: HOME | End: 2018-01-16
Payer: MEDICARE

## 2018-01-16 ENCOUNTER — APPOINTMENT (OUTPATIENT)
Dept: PHYSICAL THERAPY | Age: 58
End: 2018-01-16

## 2018-01-16 PROCEDURE — 97113 AQUATIC THERAPY/EXERCISES: CPT

## 2018-01-16 NOTE — PROGRESS NOTES
PHYSICAL THERAPY - DAILY TREATMENT NOTE - AQUATICS    Patient Name: Roman Ramos        Date: 2018  : 1960   YES Patient  Verified  Visit #:     Insurance: Payor: Sandeep Fu / Plan: [place] MEDICARE PART A & B / Product Type: Medicare /      In time: 1:00 Out time: 1:47   Total Treatment Time: 47     Medicare Time Tracking (below)   Total Timed Codes (min):  47 1:1 Treatment Time:  24     TREATMENT AREA =   Low back pain [M54.5]  Left knee pain [M25.562]    SUBJECTIVE    Pain Level (on 0 to 10 scale):  7  / 10 knee pain  8/10 LBP   Medication Changes/New allergies or changes in medical history, any new surgeries or procedures? NO    If yes, update Summary List   Subjective Functional Status/Changes:  []  No changes reported     \"I can only do 10 (reps) of each one, then I have to rest.\" (LE ex's in water)        OBJECTIVE  47  (24) min Therapeutic Exercise:   [x]  Aquatic Therapy   Rationale:      increase strength, increase ROM, and improve balance to improve the patients ability to perform ADL's and ambulate with less pain and increase activity tolerance.       [x]   Patient Education:  Continue Aquatic Therapy and HEP as instructed     UE exercise resistance:                                                  LE exercises:                                                                 [] none                                                                             []  thera-band resistance       [x] small water weights                                                   [] no UE support       [] medium water weights                                              [x]  1 UE support        [] large water weights                                                   []  2 UE support          [] back supported on wall       [] thera-band      SLS UE support:                      [] both UE                          [] 1 UE       [] 1 finger/fingers       []  none       [] EC     Post Treatment Pain Level (on 0 to 10) scale:  8 / 10 knee pain  9/10 LBP     Other  Pt takes short rest breaks approximately every 10 reps with LE ex secondary to L knee and back pain, then is able to resume ex. Pt reports increased pain in L knee and LBP at end of aquatic session today. Mod VCing for posture and min VCing for form with aquatic ex. ASSESSMENT    Assessment/Changes in Function:     Pt required rest breaks between LE ex secondary to increased sx's L/S and L knee. []  See Progress Note/Recertification   Patient will continue to benefit from skilled PT services to modify and progress therapeutic interventions, address functional mobility deficits, address strength deficits, analyze and address soft tissue restrictions, analyze and cue movement patterns, assess and modify postural abnormalities and instruct in home and community integration to attain remaining goals. Progress toward goals / Updated goals:    1. Pt will be independent with HEP at D/C for self management. Min to [employer] for form with aquatic ex. 2. Pt will increase FS FOTO Score to >/= 37  to indicate a significant decrease in functional disability. 3. Pt will increase flex and ext trunk strength to 4/5 for increased posture control during amb. 4. Pt will demo ability to allow don/doff shoes with pain < 4/10.  (50% pain decrease)     PLAN    []  Upgrade activities as tolerated YES Continue plan of care   []  Discharge due to :    []  Other:      Therapist: Manpreet Loza PTA    Date: 1/16/2018 Time: 1:50 PM     Future Appointments  Date Time Provider Siva Wright   1/18/2018 1:00 PM Praveen Gracia [facility]   1/19/2018 10:00 AM Praveen Gracia [facility]   1/22/2018 1:00 PM Praveen Gracia [facility]   1/25/2018 1:00 PM Praveen Gracia [facility]   1/26/2018 10:00 AM Praveen Gracia [facility]   1/29/2018 1:00 PM Praveen Gracia [facility]   1/30/2018 1:00 PM Praveen Gracia [facility]

## 2018-01-18 ENCOUNTER — APPOINTMENT (OUTPATIENT)
Dept: PHYSICAL THERAPY | Age: 58
End: 2018-01-18
Payer: MEDICARE

## 2018-01-19 ENCOUNTER — HOSPITAL ENCOUNTER (OUTPATIENT)
Dept: PHYSICAL THERAPY | Age: 58
Discharge: HOME | End: 2018-01-19
Payer: MEDICARE

## 2018-01-19 PROCEDURE — 97530 THERAPEUTIC ACTIVITIES: CPT

## 2018-01-19 PROCEDURE — G8978 MOBILITY CURRENT STATUS: HCPCS

## 2018-01-19 PROCEDURE — G8979 MOBILITY GOAL STATUS: HCPCS

## 2018-01-19 PROCEDURE — 97116 GAIT TRAINING THERAPY: CPT

## 2018-01-19 PROCEDURE — 97110 THERAPEUTIC EXERCISES: CPT

## 2018-01-19 NOTE — PROGRESS NOTES
PT DAILY TREATMENT NOTE 8    Patient Name: Diane Bower [facility]  Date:2018  : 1960  [x]  Patient  Verified  Payor: Patrick Acharya / Plan: [place] MEDICARE PART A & B / Product Type: Medicare /    In time:1000  Out time:1055  Total Treatment Time (min): 55    Medicare/Medicaid Total Timed Codes (min): 55  1:1 Treatment Time (min): 55     Visit #:     Treatment Area: Low back pain [M54.5]  Left knee pain [M25.562]    SUBJECTIVE  Pain Level (0-10 scale): 7  Any medication changes, allergies to medications, adverse drug reactions, diagnosis change, or new procedure performed?: [x] No    [] Yes (see summary sheet for update)  Subjective functional status/changes:   [] No changes reported  Patient reports partial compliance with HEP. OBJECTIVE    25 min Therapeutic Exercise [x]  See Flowsheet    Rationale: increase ROM and increase strength to improve the patients ability to perform sit<>stand transfers without pain increase or compensations. 10 min Therapeutic Activity [x]  See Flowsheet    Rationale: improve coordination and improve balance to improve the patients ability to perform sit<>stand transfers without pain increase or compensations. 20 min Gait Training [] Treadmill: Speed: ___. Grade: ___. Time: ___  [] Gym: Distance: ___   Dev: [] None [] SC [] QC [] AC  [] FC  [] SW [] RW   Assist:  [] None [] SB [] CG [] Min [] Mod [] Max   Rationale: decrease pain, increase ROM and increase postural awareness to improve functional mobility to safely ambulate over uneven terrain in the community. Patient Education [x] Review HEP    [] Progressed/Changed HEP based on:   [] positioning   [] body mechanics     [] transfers      [] heat/ice application       Other Objective/Functional Measures:  Patient requires TC/VC to perform therapeutic exercises correctly. Pain Level (0-10 scale) post treatment: 7    ASSESSMENT/Changes in Function: Patient tolerated treatment fairly. Providing fair effort. States effort limited by pain. Patient will continue to benefit from skilled PT services to modify and progress therapeutic interventions, address functional mobility deficits, address ROM deficits, address strength deficits, analyze and address soft tissue restrictions, analyze and cue movement patterns, analyze and modify body mechanics/ergonomics, assess and modify postural abnormalities, address imbalance/dizziness and instruct in home and community integration to attain remaining goals. []  See Plan of Care  [x]  See Progress Note/ Recertification  []  See Discharge Summary         Progress towards goals / Updated goals:  STG- 4 wks - 1/08/18  1. Pt will be independent with HEP at D/C for self management. 2. Pt will increase FS FOTO Score to >/= 37  to indicate a significant decrease in functional disability. 3. Pt will increase flex and ext trunk strength to 4/5 for increased posture control during amb. progressing  4. Pt will demo ability to allow don/doff shoes with pain < 4/10.  (50% pain decrease) progressing       PLAN  [x]  Continue plan of care  []  Upgrade activities as tolerated       []  Update interventions per flow sheet       []  Discharge due to:_  []  Other:_      Therapist:  Adonis Cronin PT  Date:  1/19/2018  Time: 11:11 AM    Future Appointments  Date Time Provider Siva Wright   1/22/2018 1:00 PM [facility]   1/25/2018 1:00 PM [facility]   1/26/2018 10:00 AM [facility]   1/29/2018 1:00 PM [facility]   1/30/2018 1:00 PM [facility]   2/1/2018 1:00 PM [facility]   2/5/2018 1:00 PM [facility]   2/7/2018 1:00 PM [facility]   2/8/2018 1:00 PM [facility]   2/12/2018 1:00 PM [facility]   2/14/2018 11:00 AM [facility]   2/15/2018 1:00 PM [facility]   2/19/2018 1:00 PM Blanchie Close [facility]   2/21/2018 11:00 AM [facility] Manuel [facility]   2/22/2018 1:00 PM Victor Manuel [facility]   2/26/2018 1:00 PM Victor Manuel [facility]   2/28/2018 11:00 AM Victor Manuel [facility]
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
## 2018-01-22 ENCOUNTER — HOSPITAL ENCOUNTER (OUTPATIENT)
Dept: PHYSICAL THERAPY | Age: 58
Discharge: HOME | End: 2018-01-22
Payer: MEDICARE

## 2018-01-22 PROCEDURE — 97113 AQUATIC THERAPY/EXERCISES: CPT

## 2018-01-22 NOTE — PROGRESS NOTES
PT DAILY TREATMENT NOTE 8    [place] [facility]  [place]  : 1960  [x]  Patient  Verified  [place] MED[place] / Plan: [place] MEDICARE PART [place] & B / Product Type: Medicare /    In time:1300  Out time:1355  Total Treatment Time (min): 55    Medicare/Medicaid Total Timed Codes (min): 30  1:1 Treatment Time (min): 30     Visit #: 20 of 28    Treatment Area: Low back pain [M54.5]  Left knee pain [M25.562]    SUBJECTIVE  Pain Level (0-10 scale): 7  Any medication changes, allergies to medications, adverse drug reactions, diagnosis change, or new procedure performed?: [x] No    [] Yes (see summary sheet for update)  Subjective functional status/changes:   [] No changes reported  Patient reports compliance with HEP. OBJECTIVE    30 min Aquatic Therapy [x]  See Flowsheet    Rationale: increase ROM, increase strength, improve coordination and improve balance to improve the patients ability to maintain neutral spinal stability while moving / performing ADLs to minimize/prevent pain increase or radicular sxs and improve functional mobility to safely ambulate over uneven terrain in the community.         Patient Education [x] Review HEP    [] Progressed/Changed HEP based on:   [] positioning   [] body mechanics     [] transfers      [] heat/ice application       Other Objective/Functional Measures:    UE exercise resistance:                                                  LE exercises:                                                                 [] none                                                                             []  thera-band resistance       [x] small water weights                                                   [] no UE support       [] medium water weights                                              [x]  1 UE support        [] large water weights                                                   []  2 UE support          [] back supported on wall       [] thera-band      SLS UE support:                      [] both UE                          [x] 1 UE       [] 1 finger/fingers       []  none       [] EC    Pain Level (0-10 scale) post treatment: 8    ASSESSMENT/Changes in Function: Patient tolerated treatment well. Providing good effort. Patient will continue to benefit from skilled PT services to modify and progress therapeutic interventions, address functional mobility deficits, address ROM deficits, address strength deficits, analyze and address soft tissue restrictions, analyze and cue movement patterns, analyze and modify body mechanics/ergonomics, assess and modify postural abnormalities, address imbalance/dizziness and instruct in home and community integration to attain remaining goals. []  See Plan of Care  []  See Progress Note/ Recertification  []  See Discharge Summary         Progress towards goals / Updated goals:  New Short-Term Goals - 1 mo - 2/19/18  1. Pt will be independent with HEP at D/C for self management progressing  2. Pt will increase FS FOTO Score to >/= 37  to indicate a significant decrease in functional disability.     PLAN  [x]  Continue plan of care  []  Upgrade activities as tolerated       []  Update interventions per flow sheet       []  Discharge due to:_  []  Other:_      Therapist:  Joann Doe, PT  Date:  1/22/2018  Time: 3:43 PM    Future Appointments  Date Time Provider Siva Wright   1/25/2018 1:00 PM Nicanor Terrell [facility]   1/26/2018 10:00 AM Nicanor Terrell [facility]   1/29/2018 1:00 PM Nicanor Terrell [facility]   1/30/2018 1:00 PM Nicanor Terrell [facility]   2/1/2018 1:00 PM Nicanor Terrell [facility]   2/5/2018 1:00 PM Nicanor Terrell [facility]   2/7/2018 1:00 PM Nicanor Terrell [facility]   2/8/2018 1:00 PM Nicanor Terrell [facility]   2/12/2018 1:00 PM Nicanor Terrell [facility]   2/14/2018 11:00 AM Nicanor Terrell [facility]   2/15/2018 1:00 PM Nicanor Terrell [facility]   2/19/2018 1:00 PM Nicanor Terrell [facility]   2/21/2018 11:00 AM Manuel Arpita [facility]   2/22/2018 1:00 PM Manuel [facility]   2/26/2018 1:00 PM Manuel [facility]   2/28/2018 11:00 AM Manuel [facility]

## 2018-01-25 ENCOUNTER — APPOINTMENT (OUTPATIENT)
Dept: PHYSICAL THERAPY | Age: 58
End: 2018-01-25
Payer: MEDICARE

## 2018-01-26 ENCOUNTER — HOSPITAL ENCOUNTER (OUTPATIENT)
Dept: PHYSICAL THERAPY | Age: 58
Discharge: HOME | End: 2018-01-26
Payer: MEDICARE

## 2018-01-26 PROCEDURE — 97110 THERAPEUTIC EXERCISES: CPT

## 2018-01-26 PROCEDURE — 97116 GAIT TRAINING THERAPY: CPT

## 2018-01-26 NOTE — PROGRESS NOTES
PT DAILY TREATMENT NOTE 8    Patient Name: Newton Kiran [facility]  Date:2018  : 1960  [x]  Patient  Verified  Payor: [place] MEDICARE / Plan: [place] MEDICARE PART A & B / Product Type: Medicare /    In time:1000  Out time:1105  Total Treatment Time (min): 65    Medicare/Medicaid Total Timed Codes (min): 65  1:1 Treatment Time (min): 65     Visit #:     Treatment Area: Low back pain [M54.5]  Left knee pain [M25.562]    SUBJECTIVE  Pain Level (0-10 scale): 8  Any medication changes, allergies to medications, adverse drug reactions, diagnosis change, or new procedure performed?: [x] No    [] Yes (see summary sheet for update)  Subjective functional status/changes:   [] No changes reported  Patient reports noncompliance with HEP- \" I just can't do it most days. It's not like I just lie on the couch, I'm up and moving throughout the day. \"    OBJECTIVE    55 min Therapeutic Exercise [x]  See Flowsheet    Rationale: increase ROM and increase strength to improve the patients ability to perform sit<>stand transfers without pain increase or compensations. 10 min Gait Training [] Treadmill: Speed: ___. Grade: ___. Time: ___  [x] Gym: Distance: 110' x 2  Dev: [] None [] SC [] QC [] AC  [] FC  [] SW [x] RW   Assist:  [] None [x] SB [] CG [] Min [] Mod [] Max   Rationale: increase ROM and increase strength to improve the patients ability to improve functional mobility to safely ambulate over uneven terrain in the community. .        Patient Education [x] Review HEP    [] Progressed/Changed HEP based on:   [] positioning   [] body mechanics     [] transfers      [] heat/ice application       Other Objective/Functional Measures:  Patient requires TC/VC to perform therapeutic exercises correctly. Pain Level (0-10 scale) post treatment: 9    ASSESSMENT/Changes in Function: Patient tolerated treatment poorly. Apparently providing good effort.  Pt states she feels better since starting PT, but her reported pain has not changed. Her posture, ROM, # of reps with NATALEE, and gait speed have increased slightly, but not significantly. Given her poor tolerance of activity outside the pool, it seems unlikely we can meet her needs at this point unless she can increase her participation in \"traditional land-based\" PT. Patient will continue to benefit from skilled PT services to modify and progress therapeutic interventions, address functional mobility deficits, address ROM deficits, address strength deficits, analyze and address soft tissue restrictions, analyze and modify body mechanics/ergonomics, assess and modify postural abnormalities, address imbalance/dizziness and instruct in home and community integration to attain remaining goals. []  See Plan of Care  []  See Progress Note/ Recertification  []  See Discharge Summary         Progress towards goals / Updated goals:  Short-Term Goals - 4 wks - 2/19/18  1. Pt will be independent with HEP at D/C for self management  2. Pt will increase FS FOTO Score to >/= 37  to indicate a significant decrease in functional disability.       PLAN  [x]  Continue plan of care  []  Upgrade activities as tolerated       []  Update interventions per flow sheet       []  Discharge due to:_  [x]  Other:_   Discussed PT options with pool closure. Pt feels strongly about continuing in pool She will let us know NV if she wishes us to Xfer her to another [facility] facility or DC so she can go to other facility that has aquatics available.     Therapist:  Gold Saavedra, PT  Date:  1/26/2018  Time: 10:13 AM    Future Appointments  Date Time Provider Siva Wright   1/30/2018 1:00 PM [facility]   2/7/2018 1:00 PM [facility]   2/14/2018 11:00 AM [facility]   2/21/2018 11:00 AM [facility]   2/28/2018 11:00 AM [facility]

## 2018-01-29 ENCOUNTER — APPOINTMENT (OUTPATIENT)
Dept: PHYSICAL THERAPY | Age: 58
End: 2018-01-29
Payer: MEDICARE

## 2018-01-30 ENCOUNTER — HOSPITAL ENCOUNTER (OUTPATIENT)
Dept: PHYSICAL THERAPY | Age: 58
Discharge: HOME | End: 2018-01-30
Payer: MEDICARE

## 2018-01-30 PROCEDURE — 97110 THERAPEUTIC EXERCISES: CPT

## 2018-01-30 PROCEDURE — 97116 GAIT TRAINING THERAPY: CPT

## 2018-01-30 NOTE — PROGRESS NOTES
PT DAILY TREATMENT NOTE     Patient Name: Tee Ramirez  Date:2018  : 1960  [x]  Patient  Verified  Payor: [place] MEDICARE / Plan: [place] MEDICARE PART A & B / Product Type: Medicare /    In time:1257  Out time:1350  Total Treatment Time (min): 53    Medicare/Medicaid Total Timed Codes (min): 53  1:1 Treatment Time (min): 53     Visit #: 22 of 28    Treatment Area: Low back pain [M54.5]  Left knee pain [M25.562]    SUBJECTIVE  Pain Level (0-10 scale): 7  Any medication changes, allergies to medications, adverse drug reactions, diagnosis change, or new procedure performed?: [x] No    [] Yes (see summary sheet for update)  Subjective functional status/changes:   [] No changes reported  Patient reports compliance with HEP. OBJECTIVE    45 min Therapeutic Exercise [x]  See Flowsheet    Rationale: increase ROM, increase strength and improve coordination to improve the patients ability to perform sit<>stand transfers without pain increase or compensations. 8 min Gait Training [] Treadmill: Speed: ___. Grade: ___. Time: ___  [x] Gym: Distance: 160' x 1. Too fatigued for 2nd attempt  Dev: [] None [] SC [] QC [] AC  [] FC  [] SW [x] RW   Assist:  [] None [x] SB [] CG [] Min [] Mod [] Max   Rationale: : increase ROM, increase strength and improve coordination improve functional mobility to safely ambulate over uneven terrain in the community. Patient Education [x] Review HEP    [] Progressed/Changed HEP based on:   [] positioning   [] body mechanics     [] transfers      [] heat/ice application       Other Objective/Functional Measures:  Patient requires TC/VC to perform therapeutic exercises correctly. Slightly increased NATALEE difficulty today, had to limit gait subsequently. Pain Level (0-10 scale) post treatment: 8    ASSESSMENT/Changes in Function: Patient tolerated treatment well. Providing good effort.      Patient will continue to benefit from skilled PT services to modify and progress therapeutic interventions, address functional mobility deficits, address ROM deficits, address strength deficits, analyze and address soft tissue restrictions, analyze and cue movement patterns, analyze and modify body mechanics/ergonomics, assess and modify postural abnormalities, address imbalance/dizziness and instruct in home and community integration to attain remaining goals. []  See Plan of Care  []  See Progress Note/ Recertification  []  See Discharge Summary         Progress towards goals / Updated goals:  Short-Term Goals - 4 wks - 2/19/18  1. Pt will be independent with HEP at D/C for self management progressing.   2. Pt will increase FS FOTO Score to >/= 37  to indicate a significant decrease in functional disability.       PLAN  [x]  Continue plan of care  []  Upgrade activities as tolerated       []  Update interventions per flow sheet       []  Discharge due to:_  []  Other:_      Therapist:  Chuckie Degroot PT  Date:  1/30/2018  Time: 2:56 PM    Future Appointments  Date Time Provider Siva Wright   2/7/2018 1:00 PM [address]   2/14/2018 11:00 AM [address]   2/21/2018 11:00 AM [address]   2/28/2018 11:00 AM [address]

## 2018-02-01 ENCOUNTER — APPOINTMENT (OUTPATIENT)
Dept: PHYSICAL THERAPY | Age: 58
End: 2018-02-01
Payer: MEDICARE

## 2018-02-05 ENCOUNTER — APPOINTMENT (OUTPATIENT)
Dept: PHYSICAL THERAPY | Age: 58
End: 2018-02-05
Payer: MEDICARE

## 2018-02-05 ENCOUNTER — OFFICE VISIT (OUTPATIENT)
Dept: ORTHOPEDIC SURGERY | Facility: CLINIC | Age: 58
End: 2018-02-05

## 2018-02-05 VITALS
HEART RATE: 64 BPM | SYSTOLIC BLOOD PRESSURE: 134 MMHG | TEMPERATURE: 98.5 F | DIASTOLIC BLOOD PRESSURE: 96 MMHG | OXYGEN SATURATION: 96 %

## 2018-02-05 DIAGNOSIS — M15.9 PRIMARY OSTEOARTHRITIS INVOLVING MULTIPLE JOINTS: ICD-10-CM

## 2018-02-05 RX ORDER — HYDROCODONE BITARTRATE AND ACETAMINOPHEN 7.5; 325 MG/1; MG/1
1 TABLET ORAL
Qty: 21 TAB | Refills: 0 | Status: SHIPPED | OUTPATIENT
Start: 2018-02-05 | End: 2018-02-15 | Stop reason: SDUPTHER
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
## 2018-02-07 ENCOUNTER — HOSPITAL ENCOUNTER (OUTPATIENT)
Dept: PHYSICAL THERAPY | Age: 58
Discharge: HOME | End: 2018-02-07
Payer: MEDICARE

## 2018-02-07 ENCOUNTER — DOCUMENTATION ONLY (OUTPATIENT)
Dept: ORTHOPEDIC SURGERY | Facility: CLINIC | Age: 58
End: 2018-02-07

## 2018-02-07 PROCEDURE — 97116 GAIT TRAINING THERAPY: CPT

## 2018-02-07 PROCEDURE — 97110 THERAPEUTIC EXERCISES: CPT

## 2018-02-07 NOTE — PROGRESS NOTES
PT DAILY TREATMENT NOTE 8-14    Patient Name: Leonardo Baptiste  Date:2018  : 1960  [x]  Patient  Verified  Payor: [place] MEDICARE / Plan: [place] MEDICARE PART A & B / Product Type: Medicare /    In time:1255  Out time:1350  Total Treatment Time (min): 55    Medicare/Medicaid Total Timed Codes (min): 55  1:1 Treatment Time (min): 55     Visit #:  of 28    Treatment Area: Low back pain [M54.5]  Left knee pain [M25.562]    SUBJECTIVE  Pain Level (0-10 scale): 8  Any medication changes, allergies to medications, adverse drug reactions, diagnosis change, or new procedure performed?: [x] No    [] Yes (see summary sheet for update)  Subjective functional status/changes:   [] No changes reported  Patient reports compliance with HEP. States has appt with ortho to determine if she needs meniscal repair. \"I hope not\". Also stated she had MRI of shoulders and it showed \"both my deltoids are torn. .. I think it's from the pool PT program\". Discussed at length with pt that the intensity of the pool RX is far too low to be the cause of her tears. DX testing NA in EMR. Continues to state things like \"I just can't. .. It's too much\" with even the lowest intensity NATALEE, but also states \" I can tell it's working. . I'm getting better\"    OBJECTIVE    45 min Therapeutic Exercise [x]  See Flowsheet    Rationale: increase ROM, increase strength and improve balance to improve the patients ability to perform sit<>stand transfers without pain increase or compensations. 10 min Gait Training [] Treadmill: Speed: ___. Grade: ___. Time: ___  [x] Gym: Distance: 110' x 3  Dev: [] None [] SC [] QC [] AC  [] FC  [] SW [x] RW   Assist:  [] None [x] SB [] CG [] Min [] Mod [] Max   Rationale: decrease pain, increase ROM and endurance to improve functional mobility to safely ambulate over uneven terrain in the community.         Patient Education [x] Review HEP    [] Progressed/Changed HEP based on:   [] positioning   [] body mechanics     [] transfers      [] heat/ice application       Other Objective/Functional Measures:  Patient requires TC/VC to perform therapeutic exercises correctly. Pain Level (0-10 scale) post treatment: 8    ASSESSMENT/Changes in Function: Patient tolerated treatment fairly. Providing fair effort. Patient will continue to benefit from skilled PT services to modify and progress therapeutic interventions, address functional mobility deficits, address ROM deficits, address strength deficits, analyze and address soft tissue restrictions, analyze and cue movement patterns, analyze and modify body mechanics/ergonomics, assess and modify postural abnormalities, address imbalance/dizziness and instruct in home and community integration to attain remaining goals. []  See Plan of Care  []  See Progress Note/ Recertification  []  See Discharge Summary         Progress towards goals / Updated goals:  Short-Term Goals - 4 wks - 2/19/18  1. Pt will be independent with HEP at D/C for self management progressing. 2. Pt will increase FS FOTO Score to >/= 37  to indicate a significant decrease in functional disability. PLAN  [x]  Continue plan of care  []  Upgrade activities as tolerated       []  Update interventions per flow sheet       []  Discharge due to:_  [x]  Other:_   Discussed DC PT at end of current RX as we seem to have plateaued with what she can tolerate. Offered relocation to site with ongoing aquatics program. Pt declined.      Therapist:  Chuckie Degroot PT  Date:  2/7/2018  Time: 10:07 AM    Future Appointments  Date Time Provider Siva Wright   2/7/2018 1:00 PM [facility]   2/14/2018 11:00 AM [facility]   2/14/2018 4:00 PM Pérez Kuhn MD [facility] SCHED   2/21/2018 11:00 AM [facility]   2/28/2018 11:00 AM [facility] hospital

## 2018-02-07 NOTE — PROGRESS NOTES
Pt came into the office stating she was told to come in to the office today (2/07/2018) @ 4:00 to see Dr. Faby Quiñones. Pt was no ton the schedule to see her, so Nicky added her on. Pt was not triaged. Dennise Winslow came upfront and stated that Dr. Faby Quiñones had to leave @ 4:00 to go back to the hospital. I verified with Dennise Winslow and Dr. Faby Quiñones that she needed to reschedule appointment. I spoke with the patient and she said that she was very upset and had cancelled two other appointments to be here today at 4. I apologized sincerely for the situation. Pt then stated that Homer Villalba had told her to come back today at 4 and that he told  at [facility]. Pt also stated that [facility] as well told  the same thing and that they had scheduled her for today at 4. I went back to see what could be done and Dr. Faby Quiñones again stated that she could not see her today and explained that she wanted to spend some extra time with the patient. I told Dennise Winslow what pt said and was told to get [facility] [place] to help me with the pt. [place] came to the front to let pt know why we weren't able to see her today and that we would accommodate her on another day at the best time for the pt. Pt stated she drove an hour and a half (lives in [place]) to get here for the appointment today. Once pt came to the window, she then stated, \" How can I make a complaint? \". I asked her to give me a moment to go and get Dr. So Puente card as well as [facility] card. I asked pt if she would like to reschedule today and pt stated that she was to angry to to do anything today.

## 2018-02-08 ENCOUNTER — APPOINTMENT (OUTPATIENT)
Dept: PHYSICAL THERAPY | Age: 58
End: 2018-02-08
Payer: MEDICARE

## 2018-02-12 ENCOUNTER — APPOINTMENT (OUTPATIENT)
Dept: PHYSICAL THERAPY | Age: 58
End: 2018-02-12
Payer: MEDICARE

## 2018-02-14 ENCOUNTER — HOSPITAL ENCOUNTER (OUTPATIENT)
Dept: PHYSICAL THERAPY | Age: 58
Discharge: HOME | End: 2018-02-14
Payer: MEDICARE

## 2018-02-14 PROCEDURE — 97110 THERAPEUTIC EXERCISES: CPT

## 2018-02-14 PROCEDURE — 97530 THERAPEUTIC ACTIVITIES: CPT

## 2018-02-14 NOTE — PROGRESS NOTES
PT DAILY TREATMENT NOTE     Patient Name: Shira Goldman [facility]  Date:2018  : 1960  [x]  Patient  Verified  Payor: Solis Edmondson / Plan: [place] PART A & B / Product Type: Medicare /    In time:1105  Out time:1155  Total Treatment Time (min): 50    Medicare/Medicaid Total Timed Codes (min): 50  1:1 Treatment Time (min): 50     Visit #: 24  28    Treatment Area: Low back pain [M54.5]  Left knee pain [M25.562]    SUBJECTIVE  Pain Level (0-10 scale): 7 knee, 8 L/S  Any medication changes, allergies to medications, adverse drug reactions, diagnosis change, or new procedure performed?: [x] No    [] Yes (see summary sheet for update)  Subjective functional status/changes:   [] No changes reported  \"can tell i'm slowly getting better. .. After last visit I was sure I was going to be really sore, and I was the day of, but the next day I felt a lot better\"     OBJECTIVE    30 min Therapeutic Exercise [x]  See Flowsheet    Rationale: increase ROM and increase strength to improve the patients ability to perform sit<>stand transfers without pain increase or compensations. 20 min Therapeutic Activity [x]  See Flowsheet    Rationale: improve coordination, improve balance and increase proprioception to improve the patients ability to improve functional mobility to safely ambulate over uneven terrain in the community. Patient Education [x] Review HEP    [] Progressed/Changed HEP based on:   [] positioning   [] body mechanics     [] transfers      [] heat/ice application       Other Objective/Functional Measures:  Patient tolerated treatment well. Providing good effort. Pain Level (0-10 scale) post treatment: 9    ASSESSMENT/Changes in Function: Patient tolerated treatment fairly. Providing good effort.      Patient will continue to benefit from skilled PT services to modify and progress therapeutic interventions, address functional mobility deficits, address ROM deficits, address strength deficits, analyze and address soft tissue restrictions, analyze and cue movement patterns, analyze and modify body mechanics/ergonomics, assess and modify postural abnormalities, address imbalance/dizziness and instruct in home and community integration to attain remaining goals. []  See Plan of Care  []  See Progress Note/ Recertification  []  See Discharge Summary         Progress towards goals / Updated goals:  Short-Term Goals - 4 wks - 2/19/18  1. Pt will be independent with HEP at D/C for self management progressing.   2. Pt will increase FS FOTO Score to >/= 37  to indicate a significant decrease in functional disability.       PLAN  [x]  Continue plan of care  []  Upgrade activities as tolerated       []  Update interventions per flow sheet       []  Discharge due to:_  []  Other:_      Therapist:  Abena Price PT  Date:  2/14/2018  Time: 6:23 PM    Future Appointments  Date Time Provider Siva Wright   2/15/2018 7:30 AM Matthew Benson NP [facility] SCHED   2/21/2018 11:00 AM [facility]   2/21/2018 1:30 PM Ann Marie Kingston MD [facility] ALBA SCHED   2/28/2018 11:00 AM [facility]   10/31/2018 7:30 AM Matthew Benson NP [address] [facility] 8760

## 2018-02-15 ENCOUNTER — APPOINTMENT (OUTPATIENT)
Dept: PHYSICAL THERAPY | Age: 58
End: 2018-02-15
Payer: MEDICARE

## 2018-02-15 ENCOUNTER — OFFICE VISIT (OUTPATIENT)
Dept: FAMILY MEDICINE CLINIC | Age: 58
End: 2018-02-15

## 2018-02-15 VITALS
BODY MASS INDEX: 39.68 KG/M2 | HEART RATE: 56 BPM | HEIGHT: 72 IN | DIASTOLIC BLOOD PRESSURE: 85 MMHG | TEMPERATURE: 97.2 F | SYSTOLIC BLOOD PRESSURE: 140 MMHG | WEIGHT: 293 LBS | RESPIRATION RATE: 18 BRPM

## 2018-02-15 DIAGNOSIS — H43.392 FLOATERS IN VISUAL FIELD, LEFT: Primary | ICD-10-CM

## 2018-02-15 DIAGNOSIS — L70.8 OTHER ACNE: ICD-10-CM

## 2018-02-15 DIAGNOSIS — F32.89 OTHER DEPRESSION: ICD-10-CM

## 2018-02-15 DIAGNOSIS — F51.01 PRIMARY INSOMNIA: ICD-10-CM

## 2018-02-15 RX ORDER — TRETINOIN 0.5 MG/G
CREAM TOPICAL
Qty: 45 G | Refills: 0 | Status: SHIPPED | OUTPATIENT
Start: 2018-02-15 | End: 2020-02-19

## 2018-02-15 RX ORDER — OMEPRAZOLE 20 MG/1
CAPSULE, DELAYED RELEASE ORAL AS NEEDED
COMMUNITY
End: 2021-02-03

## 2018-02-15 RX ORDER — TRAZODONE HYDROCHLORIDE 150 MG/1
150 TABLET ORAL 3 TIMES DAILY
Qty: 90 TAB | Refills: 0 | Status: SHIPPED | OUTPATIENT
Start: 2018-02-15 | End: 2018-05-02 | Stop reason: SDUPTHER

## 2018-02-15 NOTE — MR AVS SNAPSHOT
[address] [facility] [address] [id number] 
[phone] Patient: Karin Cedeño MRN: [id number] :1960 Visit Information Date & Time Provider Department Dept. Phone Encounter #  
 2/15/2018  7:30 AM Catal[facility] Mej[facility] [address] [id number] Follow-up Instructions Return if symptoms worsen or fail to improve. Your Appointments 2018  1:30 PM  
New Patient with Kristal England MD  
4 [facility], [address] [facility] [address] [place] [address] [facility] Appt [facility]: LT KNEE/PER CEM BRJORDIHART  
 [address] [facility], [address] [place] [address]  
[phone]  
  
   
 340 [facility], [address] [place] [id number]  
  
    
 10/31/2018  7:30 AM  
Office Visit with Roberto Carlos Ha NP  
[address] ([facility]) Appt Note: [address] [place] [address] 68 [facility] [address] 500 [facility]  
  
   
 7031  62[facility] Upcoming Health Maintenance Date Due  
 MEDICARE YEARLY EXAM 2018 BREAST CANCER SCRN MAMMOGRAM 2019 PAP AKA CERVICAL CYTOLOGY 2019 DTaP/Tdap/Td series (2 - Td) 10/29/2020 COLONOSCOPY 11/15/2022 Allergies as of 2/15/2018  Review Complete On: 2/15/2018 By: Juan Jose Kolb LPN Severity Noted Reaction Type Reactions Oxycodone High 10/09/2013    Anaphylaxis, Hives Adhesive Tape-silicones      Rash Paper tape is okay to use. Celebrex [Celecoxib]  10/09/2013    Hives Contrast Dye [Iodine]  2016    Nausea Only Abdominal pain, dizziness Cortisone  08/15/2013    Nausea and Vomiting Flexeril [Cyclobenzaprine]  2013    Nausea and Vomiting Pt states also causes confusion Keflex [Cephalexin]  2016    Diarrhea, Nausea Only Joint pain Current Immunizations  Reviewed on 3/29/2017 Name Date Influenza Vaccine Split 10/24/2012, 10/17/2011, 10/29/2010 TDAP Vaccine 10/29/2010 Not reviewed this visit You Were Diagnosed With   
  
 Codes Comments Floaters in visual field, left    -  Primary ICD-10-CM: E83.504 ICD-9-CM: 379.24 Primary insomnia     ICD-10-CM: F51.01 
ICD-9-CM: 307.42 Other depression     ICD-10-CM: F32.89 ICD-9-CM: 659 Other acne     ICD-10-CM: L70.8 ICD-9-CM: 706.1 Vitals BP Pulse Temp Resp Height(growth percentile) Weight(growth percentile) 140/85 (!) 56 97.2 °F (36.2 °C) (Oral) 18 6' 1\" (1.854 m) (!) 417 lb (189.1 kg) LMP BMI OB Status Smoking Status 01/29/2018 (Approximate) 55.02 kg/m2 Having regular periods Never Smoker Vitals History BMI and BSA Data Body Mass Index Body Surface Area 55.02 kg/m 2 3.12 m 2 Preferred Pharmacy Pharmacy Name Phone [facility] # [phone] [facility] [phone] Your Updated Medication List  
  
   
This list is accurate as of: 2/15/18  8:21 AM.  Always use your most recent med list.  
  
  
  
  
 Cholecalciferol (Vitamin D3) 50,000 unit Cap Take  by mouth Every Mon, Wed & Sun.  
  
 cinnamon bark (bulk) Powd Take 40 mg by mouth daily. clotrimazole-betamethasone topical cream  
Commonly known as:  Guyann Light Apply  to affected area two (2) times a day. COQ10  PO Take 1 Tab by mouth daily. esomeprazole 40 mg capsule Commonly known as:  NexIUM Take 1 Cap by mouth daily. Indications: Heartburn FISH OIL 1,000 mg Cap Generic drug:  omega-3 fatty acids-vitamin e Take 1 Cap by mouth daily. flaxseed oil 1,000 mg Cap Take 1,000 mg by mouth daily. * HYDROcodone-acetaminophen 7.5-325 mg per tablet Commonly known as:  Dione Rummage Take 1 Tab by mouth every six (6) hours as needed. Max Daily Amount: 4 Tabs. * HYDROcodone-acetaminophen 7.5-325 mg per tablet Commonly known as:  Dione Rummage  
 Take 1 Tab by mouth every eight (8) hours as needed for Pain. Max Daily Amount: 3 Tabs. ibuprofen 800 mg tablet Commonly known as:  MOTRIN Take 1 Tab by mouth three (3) times daily (with meals). Indications: Pain  
  
 levothyroxine 50 mcg tablet Commonly known as:  SYNTHROID  
  
 LORazepam 1 mg tablet Commonly known as:  ATIVAN Take 1 Tab by mouth daily as needed for Anxiety (Severe). MAGNESIUM PO Take 400 mg by mouth daily. omeprazole 20 mg capsule Commonly known as:  PRILOSEC Take 1 capsule by mouth twice daily OTHER Ground seaweed - 1 tsp. Daily. psyllium packet Commonly known as:  METAMUCIL Take 1 Packet by mouth daily. traZODone 150 mg tablet Commonly known as:  Mahan Rinne Take 1 Tab by mouth three (3) times daily. tretinoin 0.05 % topical cream  
Commonly known as:  RETIN-A Apply to affected area at night time. TURMERIC (BULK) Take 40 mg by mouth daily. VITAMIN B COMPLEX PO Take 1 Cap by mouth daily. VITAMIN C 250 mg tablet Generic drug:  ascorbic acid (vitamin C) Take 250 mg by mouth daily. vitamin e 200 unit capsule Commonly known as:  [address] Take 200 Units by mouth daily. * Notice: This list has 2 medication(s) that are the same as other medications prescribed for you. Read the directions carefully, and ask your doctor or other care provider to review them with you. Prescriptions Sent to Pharmacy Refills  
 traZODone (DESYREL) 150 mg tablet 0 Sig: Take 1 Tab by mouth three (3) times daily. Class: Normal  
 [facility] [address] # [address] Ph #: [phone] Route: Oral  
 tretinoin (RETIN-A) 0.05 % topical cream 0 Sig: Apply to affected area at night time. Class: Normal  
 [facility] [address] # [address] Ph #: [phone] We Performed the Following REFERRAL TO OPHTHALMOLOGY [REF57 Custom] Comments:  
 Please evaluate patient for floaters Follow-up Instructions Return if symptoms worsen or fail to improve. To-Do List   
 02/21/2018 11:00 AM  
  Appointment with Anjelica Peres at [facility] PT [address] IM ([phone])  
  
 02/28/2018 11:00 AM  
  Appointment with Anjelica Peres at [place] [address] ([phone]) Referral Information Referral ID Referred By Referred To  
  
 [id number] Yasmany Grant Not Available Visits Status Start Date End Date 1 New Request 2/15/18 2/15/19 If your referral has a status of pending review or denied, additional information will be sent to support the outcome of this decision. Patient Instructions Floaters and Flashes: Care Instructions Your Care Instructions Floaters are spots and lines that \"float\" across your field of vision. They are caused by stray cells or strands of tissue inside the eyeball. Flashes are sparkles or lightning streaks. These occur in your side vision. This is called the peripheral vision. Floaters and flashes usually aren't serious. In many cases, they're a normal part of getting older. Some people get used to them. Others find them annoying. If floaters bother you, you can try to look up and then down. This may make them go away. For now, your doctor doesn't think your symptoms are a sign of a more serious problem. But an eye exam is the only way to know for sure. Follow-up care is a key part of your treatment and safety. Be sure to make and go to all appointments, and call your doctor if you are having problems. It's also a good idea to know your test results and keep a list of the medicines you take. When should you call for help? Call your doctor now or seek immediate medical care if: 
? · You have vision changes. ? Watch closely for changes in your health, and be sure to contact your doctor if: 
? · You see new floaters. ? · You see new flashes of light. ? · You do not get better as expected. Where can you learn more? Go to http://flora-sarkis.info/. Enter B735 in the search box to learn more about \"Floaters and Flashes: Care Instructions. \" Current as of: March 3, 2017 Content Version: 11.4 © 4455-2511 yWorld. Care instructions adapted under license by op5 (which disclaims liability or warranty for this information). If you have questions about a medical condition or this instruction, always ask your healthcare professional. [address] any warranty or liability for your use of this information. Floaters and Flashes: Care Instructions Your Care Instructions Floaters are spots and lines that \"float\" across your field of vision. They are caused by stray cells or strands of tissue inside the eyeball. Flashes are sparkles or lightning streaks. These occur in your side vision. This is called the peripheral vision. Floaters and flashes usually aren't serious. In many cases, they're a normal part of getting older. Some people get used to them. Others find them annoying. If floaters bother you, you can try to look up and then down. This may make them go away. For now, your doctor doesn't think your symptoms are a sign of a more serious problem. But an eye exam is the only way to know for sure. Follow-up care is a key part of your treatment and safety. Be sure to make and go to all appointments, and call your doctor if you are having problems. It's also a good idea to know your test results and keep a list of the medicines you take. When should you call for help? Call your doctor now or seek immediate medical care if: 
? · You have vision changes. ? Watch closely for changes in your health, and be sure to contact your doctor if: 
? · You see new floaters. ? · You see new flashes of light. ? · You do not get better as expected. Where can you learn more? Go to http://flora-sarkis.info/. Enter F491 in the search box to learn more about \"Floaters and Flashes: Care Instructions. \" Current as of: March 3, 2017 Content Version: 11.4 © 8915-5832 AirCast Mobile. Care instructions adapted under license by [employer] (which disclaims liability or warranty for this information). If you have questions about a medical condition or this instruction, always ask your healthcare professional. [address] any warranty or liability for your use of this information. Introducing [facility]! Dear Valery Baltazar: Thank you for requesting a PartTec account. Our records indicate that you already have an active PartTec account. You can access your account anytime at [email or web address] Did you know that you can access your hospital and ER discharge instructions at any time in PartTec? You can also review all of your test results from your hospital stay or ER visit. Additional Information If you have questions, please visit the Frequently Asked Questions section of the PartTec website at [email or web address] Remember, PartTec is NOT to be used for urgent needs. For medical emergencies, dial 911. Now available from your iPhone and Android! Please provide this summary of care documentation to your next provider. Your primary care clinician is listed as Km Reasoner. If you have any questions after today's visit, please call [phone].

## 2018-02-15 NOTE — PROGRESS NOTES
1. Have you been to the ER, urgent care clinic since your last visit? Hospitalized since your last visit? No    2. Have you seen or consulted any other health care providers outside of the [address] [place] [address] since your last visit? Include any pap smears or colon screening.  No
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
## 2018-02-15 NOTE — PATIENT INSTRUCTIONS
Floaters and Flashes: Care Instructions  Your Care Instructions    Floaters are spots and lines that \"float\" across your field of vision. They are caused by stray cells or strands of tissue inside the eyeball. Flashes are sparkles or lightning streaks. These occur in your side vision. This is called the peripheral vision. Floaters and flashes usually aren't serious. In many cases, they're a normal part of getting older. Some people get used to them. Others find them annoying. If floaters bother you, you can try to look up and then down. This may make them go away. For now, your doctor doesn't think your symptoms are a sign of a more serious problem. But an eye exam is the only way to know for sure. Follow-up care is a key part of your treatment and safety. Be sure to make and go to all appointments, and call your doctor if you are having problems. It's also a good idea to know your test results and keep a list of the medicines you take. When should you call for help? Call your doctor now or seek immediate medical care if:  ? · You have vision changes. ? Watch closely for changes in your health, and be sure to contact your doctor if:  ? · You see new floaters. ? · You see new flashes of light. ? · You do not get better as expected. Where can you learn more? Go to http://flora-sarkis.info/. Enter D682 in the search box to learn more about \"Floaters and Flashes: Care Instructions. \"  Current as of: March 3, 2017  Content Version: 11.4  © 8694-4212 Healthwise, Incorporated. Care instructions adapted under license by [employer] (which disclaims liability or warranty for this information). If you have questions about a medical condition or this instruction, always ask your healthcare professional. [address] any warranty or liability for your use of this information.        Floaters and Flashes: Care Instructions  Your Care Instructions    Floaters are spots and lines that \"float\" across your field of vision. They are caused by stray cells or strands of tissue inside the eyeball. Flashes are sparkles or lightning streaks. These occur in your side vision. This is called the peripheral vision. Floaters and flashes usually aren't serious. In many cases, they're a normal part of getting older. Some people get used to them. Others find them annoying. If floaters bother you, you can try to look up and then down. This may make them go away. For now, your doctor doesn't think your symptoms are a sign of a more serious problem. But an eye exam is the only way to know for sure. Follow-up care is a key part of your treatment and safety. Be sure to make and go to all appointments, and call your doctor if you are having problems. It's also a good idea to know your test results and keep a list of the medicines you take. When should you call for help? Call your doctor now or seek immediate medical care if:  ? · You have vision changes. ? Watch closely for changes in your health, and be sure to contact your doctor if:  ? · You see new floaters. ? · You see new flashes of light. ? · You do not get better as expected. Where can you learn more? Go to http://flora-sarkis.info/. Enter E114 in the search box to learn more about \"Floaters and Flashes: Care Instructions. \"  Current as of: March 3, 2017  Content Version: 11.4  © 2602-5452 Healthwise, Incorporated. Care instructions adapted under license by [employer] (which disclaims liability or warranty for this information). If you have questions about a medical condition or this instruction, always ask your healthcare professional. [address] any warranty or liability for your use of this information.

## 2018-02-19 ENCOUNTER — APPOINTMENT (OUTPATIENT)
Dept: PHYSICAL THERAPY | Age: 58
End: 2018-02-19
Payer: MEDICARE

## 2018-02-21 ENCOUNTER — HOSPITAL ENCOUNTER (OUTPATIENT)
Dept: PHYSICAL THERAPY | Age: 58
Discharge: HOME | End: 2018-02-21
Payer: MEDICARE

## 2018-02-21 ENCOUNTER — OFFICE VISIT (OUTPATIENT)
Dept: ORTHOPEDIC SURGERY | Facility: CLINIC | Age: 58
End: 2018-02-21

## 2018-02-21 VITALS
TEMPERATURE: 98 F | HEART RATE: 63 BPM | SYSTOLIC BLOOD PRESSURE: 140 MMHG | DIASTOLIC BLOOD PRESSURE: 90 MMHG | OXYGEN SATURATION: 95 % | HEIGHT: 72 IN

## 2018-02-21 DIAGNOSIS — M17.0 ARTHRITIS OF BOTH KNEES: Primary | ICD-10-CM

## 2018-02-21 PROCEDURE — 97530 THERAPEUTIC ACTIVITIES: CPT

## 2018-02-21 PROCEDURE — 97110 THERAPEUTIC EXERCISES: CPT

## 2018-02-21 PROCEDURE — G8978 MOBILITY CURRENT STATUS: HCPCS

## 2018-02-21 PROCEDURE — G8979 MOBILITY GOAL STATUS: HCPCS

## 2018-02-21 RX ORDER — HYDROCODONE BITARTRATE AND ACETAMINOPHEN 7.5; 325 MG/1; MG/1
1 TABLET ORAL
Qty: 21 TAB | Refills: 0 | Status: SHIPPED | OUTPATIENT
Start: 2018-02-21 | End: 2018-05-02 | Stop reason: SDUPTHER

## 2018-02-21 NOTE — MR AVS SNAPSHOT
[place] 
 
 
 [address] [facility], [address] [facility] [address] 
[phone] Patient: Lalitha Lugo MRN: [id number] :1960 Visit Information Date & Time Provider Department Dept. Phone Encounter #  
 2018  1:30 PM Blossom Gaffney  [facility], [address] [facility] [place] [address] [phone] Your Appointments 10/31/2018  7:30 AM  
Office Visit with FARIDA Gutierrez 23 ([address]) Appt Note: [address]  
  
   
 [address] [facility] [address] Upcoming Health Maintenance Date Due  
 MEDICARE YEARLY EXAM 2018 BREAST CANCER SCRN MAMMOGRAM 2019 PAP AKA CERVICAL CYTOLOGY 2019 DTaP/Tdap/Td series (2 - Td) 10/29/2020 COLONOSCOPY 11/15/2022 Allergies as of 2018  Review Complete On: 2018 By: Blossom Gaffney MD  
  
 Severity Noted Reaction Type Reactions Oxycodone High 10/09/2013    Anaphylaxis, Hives Adhesive Tape-silicones      Rash Paper tape is okay to use. Celebrex [Celecoxib]  10/09/2013    Hives Contrast Dye [Iodine]  2016    Nausea Only Abdominal pain, dizziness Cortisone  08/15/2013    Nausea and Vomiting Flexeril [Cyclobenzaprine]  2013    Nausea and Vomiting Pt states also causes confusion Keflex [Cephalexin]  2016    Diarrhea, Nausea Only Joint pain Current Immunizations  Reviewed on 3/29/2017 Name Date Influenza Vaccine Split 10/24/2012, 10/17/2011, 10/29/2010 TDAP Vaccine 10/29/2010 Not reviewed this visit You Were Diagnosed With   
  
 Codes Comments Arthritis of both knees    -  Primary ICD-10-CM: M17.0 ICD-9-CM: 716.96 Vitals BP Pulse Temp Height(growth percentile) LMP SpO2  
 140/90 63 98 °F (36.7 °C) (Oral) 6' 1\" (1.854 m) 2018 (Approximate) 95% OB Status Smoking Status Having regular periods Never Smoker Vitals History Preferred Pharmacy Pharmacy Name Phone [facility] # [address] [facility] [phone] Your Updated Medication List  
  
   
This list is accurate as of 2/21/18  2:13 PM.  Always use your most recent med list.  
  
  
  
  
 Cholecalciferol (Vitamin D3) 50,000 unit Cap Take  by mouth Every Mon, Wed & Sun.  
  
 cinnamon bark (bulk) Powd Take 40 mg by mouth daily. clotrimazole-betamethasone topical cream  
Commonly known as:  Wellington Marii Apply  to affected area two (2) times a day. COQ10  PO Take 1 Tab by mouth daily. esomeprazole 40 mg capsule Commonly known as:  NexIUM Take 1 Cap by mouth daily. Indications: Heartburn FISH OIL 1,000 mg Cap Generic drug:  omega-3 fatty acids-vitamin e Take 1 Cap by mouth daily. flaxseed oil 1,000 mg Cap Take 1,000 mg by mouth daily. * HYDROcodone-acetaminophen 7.5-325 mg per tablet Commonly known as:  Latisha José Manuel Take 1 Tab by mouth every six (6) hours as needed. Max Daily Amount: 4 Tabs. * HYDROcodone-acetaminophen 7.5-325 mg per tablet Commonly known as:  Latisha José Manuel Take 1 Tab by mouth every eight (8) hours as needed for Pain. Max Daily Amount: 3 Tabs. ibuprofen 800 mg tablet Commonly known as:  MOTRIN Take 1 Tab by mouth three (3) times daily (with meals). Indications: Pain  
  
 levothyroxine 50 mcg tablet Commonly known as:  SYNTHROID  
  
 LORazepam 1 mg tablet Commonly known as:  ATIVAN Take 1 Tab by mouth daily as needed for Anxiety (Severe). MAGNESIUM PO Take 400 mg by mouth daily. omeprazole 20 mg capsule Commonly known as:  PRILOSEC Take 1 capsule by mouth twice daily OTHER Ground seaweed - 1 tsp. Daily. psyllium packet Commonly known as:  METAMUCIL Take 1 Packet by mouth daily. traZODone 150 mg tablet Commonly known as:  Angie Bolognese Take 1 Tab by mouth three (3) times daily. tretinoin 0.05 % topical cream  
Commonly known as:  RETIN-A Apply to affected area at night time. TURMERIC (BULK) Take 40 mg by mouth daily. VITAMIN B COMPLEX PO Take 1 Cap by mouth daily. VITAMIN C 250 mg tablet Generic drug:  ascorbic acid (vitamin C) Take 250 mg by mouth daily. vitamin e 200 unit capsule Commonly known as:  [address] Take 200 Units by mouth daily. * Notice: This list has 2 medication(s) that are the same as other medications prescribed for you. Read the directions carefully, and ask your doctor or other care provider to review them with you. We Performed the Following REFERRAL TO PHYSICAL THERAPY [LHN77 Custom] Comments:  
 Evaluate and treat bilateral knees land and aquatic To-Do List   
 02/28/2018 11:00 AM  
  Appointment with Krissy Peres at [facility] PT [address] IM ([phone])  
  
 03/07/2018 1:00 PM  
  Appointment with Krissy Peres at [facility] [address] ([phone])  
  
 03/14/2018 1:00 PM  
  Appointment with [facility] Víctor Peres at [facility] [address] [facility] [address] ([phone])  
  
 03/21/2018 1:00 PM  
  Appointment with [facility] Víctor Peres at [facility] PT [address] [facility] [address] ([phone])  
  
 03/28/2018 1:00 PM  
  Appointment with [facility] Víctor Peres at [place] [address] ([phone]) Referral Information Referral ID Referred By Referred To  
  
 [id number] Aurea QUEEN Not Available Visits Status Start Date End Date 1 New Request 2/21/18 2/21/19 If your referral has a status of pending review or denied, additional information will be sent to support the outcome of this decision. Introducing [facility]! Dear Valery Baltazar: Thank you for requesting a [employer] account. Our records indicate that you already have an active [employer] account. You can access your account anytime at [email or web address] Did you know that you can access your hospital and ER discharge instructions at any time in [facility]? You can also review all of your test results from your hospital stay or ER visit. Additional Information If you have questions, please visit the Frequently Asked Questions section of the [employer] website at [email or web address]. [employer]/. Remember, [facility] is NOT to be used for urgent needs. For medical emergencies, dial 911. Now available from your iPhone and Android! Please provide this summary of care documentation to your next provider. Your primary care clinician is listed as Mich Kirkland. If you have any questions after today's visit, please call [phone].
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
## 2018-02-21 NOTE — PROGRESS NOTES
PT DAILY TREATMENT NOTE 8-    Patient Name: Giuseppe Arredondo [facility]  Date:2018  : 1960  [x]  Patient  Verified  [place]or: [place] MEDICARE / Plan: [place] MEDICARE PART A & B / Product Type: Medicare /    In time:1100  Out time:1155  Total Treatment Time (min): 55    Medicare/Medicaid Total Timed Codes (min): 55  1:1 Treatment Time (min): 55    Visit #:     Treatment Area: Low back pain [M54.5]  Left knee pain [M25.562]    SUBJECTIVE  Pain Level (0-10 scale): 7 knee, L/S  Any medication changes, allergies to medications, adverse drug reactions, diagnosis change, or new procedure performed?: [x] No    [] Yes (see summary sheet for update)  Subjective functional status/changes:   [] No changes reported  \"i have a dr's appt later today so I can't do too much walking\"     OBJECTIVE    30 min Therapeutic Exercise [x]  See Flowsheet    Rationale: increase ROM and increase strength to improve the patients ability to perform sit<>stand transfers without pain increase or compensations. 25 min Therapeutic Activity [x]  See Flowsheet    Rationale: improve coordination, improve balance and increase proprioception to improve the patients ability to improve functional mobility to safely ambulate over uneven terrain in the community. Patient Education [x] Review HEP    [] Progressed/Changed HEP based on:   [] positioning   [] body mechanics     [] transfers      [] heat/ice application       Other Objective/Functional Measures:  Patient tolerated treatment well. Providing good effort. Pain Level (0-10 scale) post treatment: 9    ASSESSMENT/Changes in Function: Patient tolerated treatment fairly. Providing good effort.      Patient will continue to benefit from skilled PT services to modify and progress therapeutic interventions, address functional mobility deficits, address ROM deficits, address strength deficits, analyze and address soft tissue restrictions, analyze and cue movement patterns, analyze and modify body mechanics/ergonomics, assess and modify postural abnormalities, address imbalance/dizziness and instruct in home and community integration to attain remaining goals. []  See Plan of Care  [x]  See Progress Note/ Recertification  []  See Discharge Summary         Progress towards goals / Updated goals:  Short-Term Goals - 4 wks - 2/19/18  1. Pt will be independent with HEP at D/C for self management MET. 2. Pt will increase FS FOTO Score to >/= 37  to indicate a significant decrease in functional disability.  NOT MET.       PLAN  [x]  Continue plan of care  []  Upgrade activities as tolerated       []  Update interventions per flow sheet       []  Discharge due to:_  []  Other:_      Therapist:  Crista Dance, PT  Date:  2/21/2018  Time: 6:23 PM    Future Appointments  Date Time Provider Siva Wright   2/28/2018 11:00 AM [facility]   3/7/2018 1:00 PM [facility]   3/14/2018 1:00 PM [facility]   3/21/2018 1:00 PM [facility]   3/28/2018 1:00 PM [facility]   10/31/2018 7:30 AM Sae Sommers NP [address] [facility] [address]

## 2018-02-22 ENCOUNTER — APPOINTMENT (OUTPATIENT)
Dept: PHYSICAL THERAPY | Age: 58
End: 2018-02-22
Payer: MEDICARE

## 2018-02-26 ENCOUNTER — APPOINTMENT (OUTPATIENT)
Dept: PHYSICAL THERAPY | Age: 58
End: 2018-02-26
Payer: MEDICARE

## 2018-02-28 ENCOUNTER — APPOINTMENT (OUTPATIENT)
Dept: PHYSICAL THERAPY | Age: 58
End: 2018-02-28
Payer: MEDICARE

## 2018-03-07 ENCOUNTER — APPOINTMENT (OUTPATIENT)
Dept: PHYSICAL THERAPY | Age: 58
End: 2018-03-07
Payer: MEDICARE

## 2018-03-07 ENCOUNTER — HOSPITAL ENCOUNTER (OUTPATIENT)
Dept: PHYSICAL THERAPY | Age: 58
Discharge: HOME | End: 2018-03-07
Payer: MEDICARE

## 2018-03-07 PROCEDURE — 97110 THERAPEUTIC EXERCISES: CPT
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
## 2018-03-14 ENCOUNTER — HOSPITAL ENCOUNTER (OUTPATIENT)
Dept: PHYSICAL THERAPY | Age: 58
Discharge: HOME | End: 2018-03-14
Payer: MEDICARE

## 2018-03-14 ENCOUNTER — APPOINTMENT (OUTPATIENT)
Dept: PHYSICAL THERAPY | Age: 58
End: 2018-03-14
Payer: MEDICARE

## 2018-03-14 PROCEDURE — 97116 GAIT TRAINING THERAPY: CPT

## 2018-03-14 PROCEDURE — 97110 THERAPEUTIC EXERCISES: CPT

## 2018-03-14 NOTE — PROGRESS NOTES
PT DAILY TREATMENT NOTE     Patient Name: Giuseppe Arredondo [facility]  Date:3/14/2018  : 1960  [x]  Patient  Verified  Payor: Sandeep Fu / Plan: [place] MEDICARE PART A & B / Product Type: Medicare /    In time:1300  Out time:1405  Total Treatment Time (min): 65    Medicare/Medicaid Total Timed Codes (min): 55  1:1 Treatment Time (min): 55     Visit #: 27 of 35    Treatment Area: Low back pain [M54.5]  Left knee pain [M25.562]    SUBJECTIVE  Pain Level (0-10 scale): 8 knees. 9 L/S  Any medication changes, allergies to medications, adverse drug reactions, diagnosis change, or new procedure performed?: [x] No    [] Yes (see summary sheet for update)  Subjective functional status/changes:   [] No changes reported  Patient reports compliance with HEP. \"You've been an ananya. Everyone here has been so nice and helped me so much. \"    OBJECTIVE  Modality rationale: decrease inflammation and decrease pain to improve the patients ability to perform sit<>stand transfers without pain increase or compensations.    Min Type Additional Details     E-Stim:          [] Att                 [] Unatt                                [] Premod         [] IFC                          [] NMES           [] TENS instruct                          [] Other: Dose:  ___  Hz X ___ V  Location:  [] supine              [] prone  [] legs elevatedv   [] legs flat  []w/heat  []w/ice  []w/US     Traction:         [] Cervical          []Lumbar                          [] Intermitent      []Continuous Ramp/ hold/ lbs:     [] supine              [] prone  [] legs elevated    [] legs flat     US:                []Continuous      [] Pulsed                         [] 1 MHz             [] 3MHz Location:    W/cm2:      Iontophoresis with dexamethasone                         [] 40 mA             [] 80 mA [] In clinic  [] Take home patch    Heat               [] pre-NATALEE          [] post-NATALEE Location:    [] supine              [] prone  [] legs elevated [] legs flat   10 Cold Pack     [] pre-NATALEE          [x] post-NATALEE  []  Ice massage Location:  bilat knees  [x] supine              [] prone  [] legs elevated    [x] legs flat    Vasopneumatic Device Pressure:       [] lo [] med [] hi   Temperature: [] lo [] med [] hi   [] Skin assessment post-treatment:  []intact []redness- no adverse reaction       []redness - adverse reaction:        40 min Therapeutic Exercise [x]  See Flowsheet    Rationale: increase ROM and increase strength to improve the patients ability to perform sit<>stand transfers without pain increase or compensations. 15 min Gait Training [] Treadmill: Speed: ___. Grade: ___. Time: ___  [x] Gym: Distance: 200' x 2  Dev: [] None [] SC [] QC [] AC  [] FC  [] SW [x] RW   Assist:  [] None [x] SB [] CG [] Min [] Mod [] Max   Rationale:  increase ROM, increase strength, improve coordination, improve balance and increase proprioception to improve the patients ability to improve functional mobility to safely ambulate over uneven terrain in the community. Patient Education [x] Review HEP    [] Progressed/Changed HEP based on:   [] positioning   [] body mechanics     [] transfers      [] heat/ice application       Other Objective/Functional Measures:  Patient requires TC/VC to perform therapeutic exercises correctly. Pain Level (0-10 scale) post treatment: 9 knees and L/S    ASSESSMENT/Changes in Function: Patient tolerated treatment poorly. Pt's strength, endurance, and functional mobility have changed very little. Multiple times we offered to transfer her to another facility while pool was being repaired and pt declined. Now that pool here has been repaired and she will be resuming aquatics, we will allow her to extend PT for 1m month and reassess gains. It seems likely she will be discharged after 2-3 weeks of aquatics unless she makes surprising progress.      Patient will continue to benefit from skilled PT services to modify and progress therapeutic interventions, address functional mobility deficits, address ROM deficits, address strength deficits, analyze and address soft tissue restrictions, analyze and cue movement patterns, analyze and modify body mechanics/ergonomics, assess and modify postural abnormalities, address imbalance/dizziness and instruct in home and community integration to attain remaining goals. []  See Plan of Care  []  See Progress Note/ Recertification  []  See Discharge Summary         Progress towards goals / Updated goals:  STG- 4 wks - 3/21/18  1. Patient will ambulate 500' continuously with least restrictive device safely and indpendently.   2. Pt will increase FS FOTO Score to >/= 32  to indicate a significant decrease in functional disability    PLAN  [x]  Continue plan of care  []  Upgrade activities as tolerated       []  Update interventions per flow sheet       []  Discharge due to:_  []  Other:_      Therapist:  Prisca Parada PT  Date:  3/14/2018  Time: 2:54 PM    Future Appointments  Date Time Provider Siva Mcmahoni   3/21/2018 1:00 PM [facility]   3/22/2018 11:20 AM Tia John MD [address] [place] [address]   3/28/2018 1:00 PM Richard Valderrama, PT [facility]   4/2/2018 1:00 PM Eustaquio Klinefelter, PT [facility]   4/4/2018 1:00 PM Eustaquio Klinefelter, PT [facility]   4/5/2018 1:00 PM Eustaquio Klinefelter, PT [facility]   4/9/2018 1:00 PM Richard Valderrama, PT [facility]   4/11/2018 1:00 PM Richard Valderrama, PT [facility]   4/12/2018 1:00 PM Richard Valderrama, PT [facility]   4/16/2018 1:00 PM Richard Valderrama, PT [facility]   4/18/2018 1:00 PM Richard Valderrama, PT [facility]   4/19/2018 1:00 PM Richard Valderrama, PT [facility]   4/23/2018 1:00 PM Richard Valderrama, PT [facility]   4/25/2018 1:00 PM Richard Valderrama, PT [facility]   4/26/2018 1:00 PM Richard Valderrama, PT [facility]   4/30/2018 1:00 PM Richard Valderrama, [address] [place] [address]   10/31/2018 7:30 AM Emerita Andrews NP [address] [facility] 0082

## 2018-03-21 ENCOUNTER — HOSPITAL ENCOUNTER (OUTPATIENT)
Dept: PHYSICAL THERAPY | Age: 58
Discharge: HOME | End: 2018-03-21
Payer: MEDICARE

## 2018-03-21 PROCEDURE — 97110 THERAPEUTIC EXERCISES: CPT

## 2018-03-21 PROCEDURE — 97116 GAIT TRAINING THERAPY: CPT
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
## 2018-03-21 NOTE — PROGRESS NOTES
PT DAILY TREATMENT NOTE 8-    Patient Name: Cory Sotomayor  Date:3/21/2018  : 1960  [x]  Patient  Verified  Payor: Jed Augustine / Plan: VA MEDICARE PART A & B / Product Type: Medicare /    In time:1300  Out time:1410  Total Treatment Time (min): 70    Medicare/Medicaid Total Timed Codes (min): 60  1:1 Treatment Time (min): 60     Visit #: 28 of 35    Treatment Area: Low back pain [M54.5]  Left knee pain [M25.562]    SUBJECTIVE  Pain Level (0-10 scale): 8 knees + L/S  Any medication changes, allergies to medications, adverse drug reactions, diagnosis change, or new procedure performed?: [x] No    [] Yes (see summary sheet for update)  Subjective functional status/changes:   [] No changes reported  Patient reports compliance with HEP. \"You've been a lifesaver. I'm so grateful to everyone here who's been so kind and helpful. \"    OBJECTIVE  Modality rationale: decrease inflammation and decrease pain to improve the patients ability to perform sit<>stand transfers without pain increase or compensations.    Min Type Additional Details     E-Stim:          [] Att                 [] Unatt                                [] Premod         [] IFC                          [] NMES           [] TENS instruct                          [] Other: Dose:  ___  Hz X ___ V  Location:  [] supine              [] prone  [] legs elevatedv   [] legs flat  []w/heat  []w/ice  []w/US     Traction:         [] Cervical          []Lumbar                          [] Intermitent      []Continuous Ramp/ hold/ lbs:     [] supine              [] prone  [] legs elevated    [] legs flat     US:                []Continuous      [] Pulsed                         [] 1 MHz             [] 3MHz Location:    W/cm2:      Iontophoresis with dexamethasone                         [] 40 mA             [] 80 mA [] In clinic  [] Take home patch    Heat               [] pre-NATALEE          [] post-NATALEE Location:    [] supine              [] prone  [] legs elevated    [] legs flat   10 Cold Pack     [] pre-NATALEE          [x] post-NATALEE  []  Ice massage Location:  bilat knees and L/S  [x] supine              [] prone  [] legs elevated    [x] legs flat    Vasopneumatic Device Pressure:       [] lo [] med [] hi   Temperature: [] lo [] med [] hi   [] Skin assessment post-treatment:  []intact []redness- no adverse reaction       []redness - adverse reaction:        45 min Therapeutic Exercise [x]  See Flowsheet    Rationale: increase ROM and increase strength to improve the patients ability to perform sit<>stand transfers without pain increase or compensations. 15 min Gait Training [] Treadmill: Speed: ___. Grade: ___. Time: ___  [x] Gym: Distance: 200' + 12'  Dev: [] None [] SC [] QC [] AC  [] FC  [] SW [x] RW   Assist:  [] None [x] SB [] CG [] Min [] Mod [] Max   Rationale:  increase ROM, increase strength, improve coordination, improve balance and increase proprioception to improve the patients ability to improve functional mobility to safely ambulate over uneven terrain in the community. Patient Education [x] Review HEP    [] Progressed/Changed HEP based on:   [] positioning   [] body mechanics     [] transfers      [] heat/ice application       Other Objective/Functional Measures:  Patient requires TC/VC to perform therapeutic exercises correctly. Pain Level (0-10 scale) post treatment: 8 knees. 9 L/S    ASSESSMENT/Changes in Function: Patient tolerated treatment poorly. Pt's strength, endurance, and functional mobility have changed very little. Multiple times we offered to transfer her to another facility while pool was being repaired and pt declined. Now that pool here has been repaired and she will be resuming aquatics, we will allow her to extend PT for 1m month and reassess gains. It seems likely she will be discharged after 2-3 weeks of aquatics unless she makes surprising progress.      Patient will continue to benefit from skilled PT services to modify and progress therapeutic interventions, address functional mobility deficits, address ROM deficits, address strength deficits, analyze and address soft tissue restrictions, analyze and cue movement patterns, analyze and modify body mechanics/ergonomics, assess and modify postural abnormalities, address imbalance/dizziness and instruct in home and community integration to attain remaining goals. []  See Plan of Care  []  See Progress Note/ Recertification  []  See Discharge Summary         Progress towards goals / Updated goals:  STG- 4 wks - 3/21/18  1. Patient will ambulate 500' continuously with least restrictive device safely and indpendently. NOT MET. CONTINUE. 2. Pt will increase FS FOTO Score to >/= 32  to indicate a significant decrease in functional disability  NOT MET. CONTINUE.     PLAN  [x]  Continue plan of care  []  Upgrade activities as tolerated       []  Update interventions per flow sheet       []  Discharge due to:_  []  Other:_      Therapist:  Keiko Lugo PT  Date:  3/21/2018  Time: 2:54 PM    Future Appointments  Date Time Provider Siva Wright   3/22/2018 11:20 AM Berlin Kate MD [address] [place] [address]   3/28/2018 1:00 PM Patis Jose, PT [facility]   4/2/2018 1:00 PM Annie Sprain, PT [facility]   4/4/2018 1:00 PM Annie Sprain, PT [facility]   4/5/2018 1:00 PM Annie Sprain, PT [facility]   4/9/2018 1:00 PM Patis Jose, PT [facility]   4/11/2018 1:00 PM Lafrances Bristle, PT [facility]   4/12/2018 1:00 PM Lafrances Bristle, PT [facility]   4/16/2018 1:00 PM Lafrances Bristle, PT [facility]   4/18/2018 1:00 PM Lafrances Bristle, PT [facility]   4/19/2018 1:00 PM Lafkeenas Jose, PT [facility]   4/23/2018 1:00 PM Lafkeenas Jose, PT [facility]   4/25/2018 1:00 PM Rashmi Garcia, PT [facility]   4/26/2018 1:00 PM Rashmi Garcia, PT [facility]   4/30/2018 1:00 PM Rashmi Garcia, [address] [place] [address]   10/31/2018 7:30 AM Gauri Patterson, [facility] [address] [facility] 4205

## 2018-03-22 ENCOUNTER — OFFICE VISIT (OUTPATIENT)
Dept: CARDIOLOGY CLINIC | Age: 58
End: 2018-03-22

## 2018-03-22 VITALS
OXYGEN SATURATION: 98 % | SYSTOLIC BLOOD PRESSURE: 130 MMHG | BODY MASS INDEX: 39.68 KG/M2 | WEIGHT: 293 LBS | DIASTOLIC BLOOD PRESSURE: 80 MMHG | HEIGHT: 72 IN | HEART RATE: 63 BPM

## 2018-03-22 DIAGNOSIS — R00.0 TACHYCARDIA: ICD-10-CM

## 2018-03-22 DIAGNOSIS — E78.5 HYPERLIPIDEMIA WITH TARGET LDL LESS THAN 130: ICD-10-CM

## 2018-03-22 DIAGNOSIS — R01.1 HEART MURMUR: Primary | ICD-10-CM

## 2018-03-22 DIAGNOSIS — I71.21 ASCENDING AORTIC ANEURYSM: ICD-10-CM

## 2018-03-22 DIAGNOSIS — E07.9 THYROID DISEASE: ICD-10-CM
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
## 2018-03-22 NOTE — PROGRESS NOTES
1. Have you been to the ER, urgent care clinic since your last visit? Hospitalized since your last visit? No     2. Have you seen or consulted any other health care providers outside of the [address] [place] [address] since your last visit? Include any pap smears or colon screening.  No

## 2018-03-28 ENCOUNTER — HOSPITAL ENCOUNTER (OUTPATIENT)
Dept: PHYSICAL THERAPY | Age: 58
Discharge: HOME | End: 2018-03-28
Payer: MEDICARE

## 2018-03-28 PROCEDURE — 97530 THERAPEUTIC ACTIVITIES: CPT

## 2018-03-28 PROCEDURE — 97110 THERAPEUTIC EXERCISES: CPT

## 2018-03-28 NOTE — PROGRESS NOTES
PHYSICAL THERAPY - DAILY TREATMENT NOTE    Patient Name: Goyo Byrne        Date: 3/28/2018  : 1960   YES Patient  Verified  Visit #:   34   of   35  Insurance: Payor: [place] MED[place]ARE / Plan: [place] MEDICARE PART A & B / Product Type: Medicare /      In time: 1:00 Out time: 2:15   Total Treatment Time: 65     Medicare Time Tracking (below)   Total Timed Codes (min):  65 1:1 Treatment Time:  55     TREATMENT AREA = Low back pain [M54.5]  Left knee pain [M25.562]    SUBJECTIVE    Pain Level (on 0 to 10 scale):  8  / 10   Medication Changes/New allergies or changes in medical history, any new surgeries or procedures? NO    If yes, update Summary List   Subjective Functional Status/Changes:  []  No changes reported     \"8 in my knee and back. \"          OBJECTIVE    Therapeutic Procedures:  Min Procedure Specifics + Rationale   n/a [x]  Patient Education (performed throughout session) [x] Review HEP    [] Progressed/Changed HEP based on:   [] proper performance and advancement of Therex/TA   [] reduction in pain level    [] increased functional capacity       [] change in directional preference   45(45) [x] Therapeutic Exercise   [x]  See Flowsheet   Rationale: increase ROM and increase strength to improve the patients ability to participate in ADL's    10(10) [x] Therapeutic Activity   [x]  See Flowsheet  Sit<-->stand, gait training x 3 laps. Rationale:  To improve safety, proprioception, coordination, and efficiency with tasks     Modality rationale: decrease inflammation, decrease pain, increase tissue extensibility and increase muscle contraction/control to improve the patients ability to perform ADL's with greater ease   Min Type Additional Details   10 [x]  Cold Pack  [] pre-NATALEE          [x] post-NATALEE  []  Ice massage Location:L/S, B knees    [x] supine              [] prone  [x] legs elevated    [] legs flat   [] Skin assessment post-treatment:  []intact []redness- no adverse reaction []redness - adverse reaction:     Other Objective/Functional Measures:    Discussed with patient limitation in progression vs authorization. Patient agreed to cx several appointments and drop to 1x/week in pool, with intent to join recreational center to continue independently. Printed land based HEP as well as copy of aquatic HEP for patient, which was laminated for her. Discussed with patient either focusing on land or water based PT. Patient agreed that aquatic based PT would be more motivational for her to continue independently, and agreed to refresh with remaining treatment sessions. Post Treatment Pain Level (on 0 to 10) scale:   8.5  / 10     ASSESSMENT    Assessment/Changes in Function:     Increased conditioning for loaded activity as patient was able to ambulate 2 full laps prior to requiring rest.    []  See Progress Note/ Recertification  []  See Discharge Summary        Patient will continue to benefit from skilled PT services to modify and progress therapeutic interventions and instruct in home and community integration  to attain remaining goals   Progress toward goals / Updated goals:    1st session since progress note. No significant progress observed towards LTGs. PLAN    [x]  Upgrade activities as tolerated  [x]  Update interventions per flow sheet YES Continue plan of care   []  Discharge due to :    []  Other:      Therapist: Shiva Fagan \"BJ\" SNEHA Borden, Cert. MDT, Cert. DN, Cert.  SMT    Date: 3/28/2018 Time: 1:18 PM   Future Appointments  Date Time Provider Siva Wright   4/2/2018 1:00 PM Matthew Khan [facility]   4/4/2018 1:00 PM Matthew Khan [facility]   4/5/2018 1:00 PM Matthew Khan [facility]   4/9/2018 9:00 AM [facility]- IE33 MACHINE (WT ) [facility]   4/9/2018 1:00 PM Ju Gonsalez [facility]   4/11/2018 1:00 PM Ju Gonsalez [facility]   4/12/2018 1:00 PM Ju Gonsalez [facility]   4/16/2018 1:00 PM Ju Gonsalez, [address] [place] [address]   4/18/2018 1:00 PM Chelsie Mccarty, PT [facility]   4/19/2018 1:00 PM Chelsie Mccarty, PT [facility]   4/23/2018 1:00 PM Chelsie Mccarty, PT [facility]   4/25/2018 1:00 PM Chelsie Mccarty, PT [facility]   4/26/2018 1:00 PM Chelsie Mccarty, PT [facility]   4/30/2018 1:00 PM Chelsie Mccarty, PT [facility]   10/31/2018 7:30 AM Rhoda Fan NP [address] [facility] [address]

## 2018-04-02 ENCOUNTER — HOSPITAL ENCOUNTER (OUTPATIENT)
Dept: PHYSICAL THERAPY | Age: 58
Discharge: HOME | End: 2018-04-02
Payer: MEDICARE

## 2018-04-02 PROCEDURE — 97113 AQUATIC THERAPY/EXERCISES: CPT

## 2018-04-02 NOTE — PROGRESS NOTES
PHYSICAL THERAPY - DAILY TREATMENT NOTE - AQUATICS    Patient Name: Macey Grullon        Date: 2018  : 1960   YES Patient  Verified  Visit #:     Insurance: Payor: Nguyen Martel / Plan: VA MEDICARE PART A & B / Product Type: Medicare /      In time: 1:00 Out time: 1:55   Total Treatment Time: 55     Medicare Time Tracking (below)   Total Timed Codes (min):  55 1:1 Treatment Time:  10     TREATMENT AREA =  LBP, B knees    SUBJECTIVE    Pain Level (on 0 to 10 scale):  8  / 10   Medication Changes/New allergies or changes in medical history, any new surgeries or procedures?     NO    If yes, update Summary List   Subjective Functional Status/Changes:  []  No changes reported     \"I feel like I can do more than I could before\"          OBJECTIVE  55  (10) min Therapeutic Exercise:   [x]  Aquatic Therapy   Rationale:      increase ROM, increase strength and improve balance to improve the patients ability to amb and perform ADLs with increased ease      [x]   Patient Education:  Continue Aquatic Therapy and HEP as instructed     UE exercise resistance:                                                  LE exercises:                                                                 [] none                                                                             []  thera-band resistance       [] small water weights                                                   [] no UE support       [] medium water weights                                              [x]  1 UE support        [x] large water weights                                                   [x]  2 UE support          [] back supported on wall       [] thera-band      SLS UE support:                      [x] both UE                          [x] 1 UE        [] 1 finger/fingers       []  none       [] EC     Post Treatment Pain Level (on 0 to 10) scale:   8  / 10     Other  Pt required frequents rest breaks throughout session to flex and perform PPT for LBP relief. Educated pt on importance for core requirement during LE TE to prevent instability of lower trunk  VC for LE form with TE      ASSESSMENT    Assessment/Changes in Function:     Pt reports increased tolerance to TE today     []  See Progress Note/Recertification   Patient will continue to benefit from skilled PT services to modify and progress therapeutic interventions, address functional mobility deficits, address ROM deficits, address strength deficits, analyze and address soft tissue restrictions, analyze and cue movement patterns, analyze and modify body mechanics/ergonomics, assess and modify postural abnormalities, address imbalance/dizziness and instruct in home and community integration to attain remaining goals. Progress toward goals / Updated goals:  LE exercises to increase functional tolerance and FOTO score:   1. Pt will be safe and independent with aquatics HEP at D/C for self management  2.  Pt will increase FS FOTO Score to >/= 32  to indicate a significant decrease in functional disability.        PLAN    []  Upgrade activities as tolerated YES Continue plan of care   []  Discharge due to :    []  Other:      Therapist: Prudence Carranza PT    Date: 4/2/2018 Time: 3:43 PM

## 2018-04-04 ENCOUNTER — APPOINTMENT (OUTPATIENT)
Dept: PHYSICAL THERAPY | Age: 58
End: 2018-04-04
Payer: MEDICARE

## 2018-04-05 ENCOUNTER — APPOINTMENT (OUTPATIENT)
Dept: PHYSICAL THERAPY | Age: 58
End: 2018-04-05
Payer: MEDICARE

## 2018-04-09 ENCOUNTER — APPOINTMENT (OUTPATIENT)
Dept: PHYSICAL THERAPY | Age: 58
End: 2018-04-09
Payer: MEDICARE

## 2018-04-11 ENCOUNTER — APPOINTMENT (OUTPATIENT)
Dept: PHYSICAL THERAPY | Age: 58
End: 2018-04-11
Payer: MEDICARE

## 2018-04-12 ENCOUNTER — HOSPITAL ENCOUNTER (OUTPATIENT)
Dept: NON INVASIVE DIAGNOSTICS | Age: 58
Discharge: HOME | End: 2018-04-12
Attending: INTERNAL MEDICINE
Payer: MEDICARE

## 2018-04-12 ENCOUNTER — APPOINTMENT (OUTPATIENT)
Dept: PHYSICAL THERAPY | Age: 58
End: 2018-04-12
Payer: MEDICARE

## 2018-04-12 DIAGNOSIS — R01.1 HEART MURMUR: ICD-10-CM

## 2018-04-12 PROCEDURE — 93306 TTE W/DOPPLER COMPLETE: CPT

## 2018-04-16 ENCOUNTER — HOSPITAL ENCOUNTER (OUTPATIENT)
Dept: PHYSICAL THERAPY | Age: 58
Discharge: HOME | End: 2018-04-16
Payer: MEDICARE

## 2018-04-16 PROCEDURE — 97113 AQUATIC THERAPY/EXERCISES: CPT

## 2018-04-16 NOTE — PROGRESS NOTES
PHYSICAL THERAPY - DAILY TREATMENT NOTE - AQUATICS    Patient Name: Therno Raphael        Date: 2018  : 1960   YES Patient  Verified  Visit #:   32   of   34  Insurance: Payor: Vahe Fend / Plan: VA MEDICARE PART A & B / Product Type: Medicare /      In time: 12:50 Out time: 1:50   Total Treatment Time: 60     Medicare Time Tracking (below)   Total Timed Codes (min):  60 1:1 Treatment Time:  23     TREATMENT AREA =  Low back pain [M54.5]  Left knee pain [M25.562]    SUBJECTIVE    Pain Level (on 0 to 10 scale):  7Knee, 8L/S  / 10   Medication Changes/New allergies or changes in medical history, any new surgeries or procedures? NO    If yes, update Summary List   Subjective Functional Status/Changes:  []  No changes reported     \"Feeling it more in the back than my knee. The hardest thing for me is getting off a low seat. \"         OBJECTIVE  60(23) min Therapeutic Exercise:   [x]  Aquatic Therapy   Rationale:      increase ROM, increase strength, improve coordination, and improve balance to improve the patients ability to perform ADL's and increase level of independence.      [x]   Patient Education:  Continue Aquatic Therapy and HEP as instructed     UE exercise resistance:                                                  LE exercises:                                                                 [] none                                                                             []  thera-band resistance       [] small water weights                                                   [] no UE support       [] medium water weights                                              []  1 UE support        [x] large water weights                                                   [x]  2 UE support          [] back supported on wall       [] thera-band      SLS UE support:                      [x] both UE                          [] 1 UE       [] 1 finger/fingers       []  none       [] EC     Post Treatment Pain Level (on 0 to 10) scale:   8/8  / 10     Other  Performed unilateral water bell extensions and horizontal add/abductions       ASSESSMENT    Assessment/Changes in Function:     Water continues to yield positive result in alleviation of symptoms but pain returns upon exiting water     []  See Progress Note/Recertification   Patient will continue to benefit from skilled PT services to modify and progress therapeutic interventions, address functional mobility deficits, address ROM deficits, address strength deficits, analyze and address soft tissue restrictions, analyze and cue movement patterns, analyze and modify body mechanics/ergonomics and instruct in home and community integration to attain remaining goals. to attain remaining goals. Progress toward goals / Updated goals:    Progressing in level of independence and balance while in water. PLAN    []  Upgrade activities as tolerated YES Continue plan of care   []  Discharge due to :    []  Other:      Therapist: Emory Ortiz \"WALE\" SNEHA Borden, Cert. MDT, Cert.  DN    Date: 4/16/2018 Time: 12:56 PM   Future Appointments  Date Time Provider Siva Wright   4/16/2018 1:00 PM Des Lamas PT [facility]   4/23/2018 1:00 PM Des Lamas PT [facility]   4/30/2018 1:00 PM Des Lamas PT [facility]   10/31/2018 7:30 AM Namita Diaz NP [address] [facility] [address]

## 2018-04-18 ENCOUNTER — TELEPHONE (OUTPATIENT)
Dept: CARDIOLOGY CLINIC | Age: 58
End: 2018-04-18

## 2018-04-18 ENCOUNTER — APPOINTMENT (OUTPATIENT)
Dept: PHYSICAL THERAPY | Age: 58
End: 2018-04-18
Payer: MEDICARE

## 2018-04-18 NOTE — TELEPHONE ENCOUNTER
Patient left a voicemail on the medical records line requesting that someone call her with her recent test results.

## 2018-04-19 ENCOUNTER — APPOINTMENT (OUTPATIENT)
Dept: PHYSICAL THERAPY | Age: 58
End: 2018-04-19
Payer: MEDICARE

## 2018-04-23 ENCOUNTER — HOSPITAL ENCOUNTER (OUTPATIENT)
Dept: PHYSICAL THERAPY | Age: 58
Discharge: HOME | End: 2018-04-23
Payer: MEDICARE

## 2018-04-23 PROCEDURE — 97113 AQUATIC THERAPY/EXERCISES: CPT

## 2018-04-23 NOTE — PROGRESS NOTES
PHYSICAL THERAPY - DAILY TREATMENT NOTE - AQUATICS    Patient Name: Alan Clifton        Date: 2018  : 1960   YES Patient  Verified  Visit #:     Insurance: Payor: Erma Mck[place]ington / Plan: VA MEDICARE PART A & B / Product Type: Medicare /      In time: 12:50 Out time: 1:51   Total Treatment Time: 61     Medicare Time Tracking (below)   Total Timed Codes (min):  60 1:1 Treatment Time:  38     TREATMENT AREA =  Low back pain [M54.5]  Left knee pain [M25.562]    SUBJECTIVE    Pain Level (on 0 to 10 scale):  7  / 10   Medication Changes/New allergies or changes in medical history, any new surgeries or procedures? NO    If yes, update Summary List   Subjective Functional Status/Changes:  []  No changes reported     \"It's been a day, but not in as much pain in my knee. Mostly my back\"         OBJECTIVE  60(38 min Therapeutic Exercise:   [x]  Aquatic Therapy   Rationale:      increase ROM, increase strength, improve coordination, and improve balance to improve the patients ability to perform ADL's and increase level of independence.      [x]   Patient Education:  Continue Aquatic Therapy and HEP as instructed     UE exercise resistance:                                                  LE exercises:                                                                 [] none                                                                             []  thera-band resistance       [] small water weights                                                   [] no UE support       [] medium water weights                                              []  1 UE support        [] large water weights                                                   []  2 UE support          [] back supported on wall       [] thera-band      SLS UE support:                      [] both UE                          [] 1 UE       [] 1 finger/fingers       []  none       [] EC     Post Treatment Pain Level (on 0 to 10) scale: 8L/S  / 10     Other  performed Warrior 2 in static position       ASSESSMENT    Assessment/Changes in Function:     Maintaining level of independence with water therapy     []  See Progress Note/Recertification   Patient will continue to benefit from skilled PT services to instruct in home and community integration to attain remaining goals. to attain remaining goals. Progress toward goals / Updated goals:    Progressing towards DC NV     PLAN    []  Upgrade activities as tolerated YES Continue plan of care   []  Discharge due to :    []  Other:      Therapist: Tavon Lorenz \"BJ\" SNEHA Borden, Cert. MDT, Cert.  DN    Date: 4/23/2018 Time: 1:05 PM   Future Appointments  Date Time Provider Siva Wright   4/30/2018 1:00 PM Kay Bach, [address] [place] [address]   10/31/2018 7:30 AM Hannah Cameron NP [address] [facility] [address]

## 2018-04-25 ENCOUNTER — APPOINTMENT (OUTPATIENT)
Dept: PHYSICAL THERAPY | Age: 58
End: 2018-04-25
Payer: MEDICARE

## 2018-04-26 ENCOUNTER — APPOINTMENT (OUTPATIENT)
Dept: PHYSICAL THERAPY | Age: 58
End: 2018-04-26
Payer: MEDICARE

## 2018-04-30 ENCOUNTER — HOSPITAL ENCOUNTER (OUTPATIENT)
Dept: PHYSICAL THERAPY | Age: 58
Discharge: HOME | End: 2018-04-30
Payer: MEDICARE

## 2018-04-30 PROCEDURE — G8979 MOBILITY GOAL STATUS: HCPCS

## 2018-04-30 PROCEDURE — G8980 MOBILITY D/C STATUS: HCPCS

## 2018-04-30 PROCEDURE — 97113 AQUATIC THERAPY/EXERCISES: CPT

## 2018-04-30 NOTE — PROGRESS NOTES
PHYSICAL THERAPY - DAILY TREATMENT NOTE - AQUATICS    Patient Name: Hortencia Tristan        Date: 2018  : 1960   YES Patient  Verified  Visit #:   35   of   34  Insurance: Payor: Hansa Pradhan / Plan: VA MEDICARE PART A & B / Product Type: Medicare /      In time: 12:50 Out time: 1:51   Total Treatment Time: 61     Medicare Time Tracking (below)   Total Timed Codes (min):  61 1:1 Treatment Time:  23     TREATMENT AREA =  Low back pain [M54.5]  Left knee pain [M25.562]    SUBJECTIVE    Pain Level (on 0 to 10 scale):  7/7   / 10   Medication Changes/New allergies or changes in medical history, any new surgeries or procedures? NO    If yes, update Summary List   Subjective Functional Status/Changes:  []  No changes reported     \"I'm graduating today, aren't I?\"         OBJECTIVE  61(23) min Therapeutic Exercise:   [x]  Aquatic Therapy   Rationale:      increase ROM, increase strength, improve coordination, and improve balance to improve the patients ability to perform ADL's and increase level of independence.      [x]   Patient Education:  Continue Aquatic Therapy and HEP as instructed     UE exercise resistance:                                                  LE exercises:                                                                 [] none                                                                             []  thera-band resistance       [] small water weights                                                   [] no UE support       [] medium water weights                                              []  1 UE support        [] large water weights                                                   []  2 UE support          [] back supported on wall       [] thera-band      SLS UE support:                      [] both UE                          [] 1 UE       [] 1 finger/fingers       []  none       [] EC     Post Treatment Pain Level (on 0 to 10) scale:   7  / 10     Other  reviewed HEP for DC       ASSESSMENT    Assessment/Changes in Function:     See DC     []  See Progress Note/Recertification   Patient will continue to benefit from skilled PT services to /a to attain remaining goals. to attain remaining goals. Progress toward goals / Updated goals:    See DC     PLAN    []  Upgrade activities as tolerated NO Continue plan of care   []  Discharge due to :    []  Other:      Therapist: Shabnam Tijerina \"WALE\" SNEHA Borden, Cert. MDT, Cert.  DN    Date: 4/30/2018 Time: 12:56 PM   Future Appointments  Date Time Provider Siva Wright   4/30/2018 1:00 PM Maggie Hawk, [address] [place] [address]   10/31/2018 7:30 AM Valerie Hagan NP [address] [facility] [address]

## 2018-04-30 NOTE — PROGRESS NOTES
[facility] PHYSICAL THERAPY  [address] [place] [address], [address] [employer] [address] - Phone: [phone]  Fax: [phone] SUMMARY  Patient Name: Sole Chaudhry : 1960   Treatment/Medical Diagnosis: Low back pain [M54.5]  Left knee pain [M25.562]   Referral Source: Odalis Hoskins     Date of Initial Visit: 17 Attended Visits: 33 Missed Visits: Ezekiel Pang  Patient's POC has consisted of therex, therapeutic activities, manual therapy prn, modalities prn, pt. education, and a comprehensive HEP. Treatment strategies used to address functional mobility deficits, ROM deficits, strength deficits, analyze and address soft tissue restrictions, analyze and cue movement patterns, analyze and modify body mechanics/ergonomics, assess and modify postural abnormalities and instruct in home and community integration. CURRENT STATUS  Patient is now fully independent in performing our aquatherapy program, as well as her land based HEP. Patient advised to attain her own [employer] pool membership in order to facilitate and continue gains in relief after discharge. She has exhausted our facility's PT services at this time. GOALS/MEASURE OF PROGRESS Goal Met? 1. Pt will be safe and independent with aquatics HEP at D/C for self management  2. Pt will increase FS FOTO Score to >/= 32  to indicate a significant decrease in functional disability. MET    31       G-Codes: Mobility   Goal  CL= 60-79%  D/C  CL= 60-79%. The severity rating is based on the FOTO Score    RECOMMENDATIONS  Discontinue therapy due to lack of appreciable progress towards goals. We discussed at length that PT is NOT a maintenance program and it is her responsibility to continue to self manage/progress her gains with her HEP. If you have any questions/comments please contact us directly at [phone].    Thank you for allowing us to assist in the care of your patient. Therapist Signature: Vasyl Adrian \"BJ\" Valerie Santana, SNEHA, Cert. MDT, Cert. DN, Cert. SMT Date: 4/30/18   Reporting Period: n/a     Certification Period n/a Time: 1:03 PM     NOTE TO PHYSICIAN:  PLEASE COMPLETE THE ORDERS BELOW AND FAX TO   [facility]: [phone]  If you are unable to process this request in 24 hours please contact our office: [phone]    ___ I have read the above report and request that my patient continue as recommended.   ___ I have read the above report and request that my patient continue therapy with the following changes/special instructions:_________________________________________________________   ___ I have read the above report and request that my patient be discharged from therapy.      Physician Signature:        Date:     Time:

## 2018-05-02 ENCOUNTER — OFFICE VISIT (OUTPATIENT)
Dept: FAMILY MEDICINE CLINIC | Age: 58
End: 2018-05-02

## 2018-05-02 VITALS
OXYGEN SATURATION: 97 % | HEART RATE: 59 BPM | SYSTOLIC BLOOD PRESSURE: 137 MMHG | WEIGHT: 293 LBS | HEIGHT: 72 IN | BODY MASS INDEX: 39.68 KG/M2 | RESPIRATION RATE: 16 BRPM | DIASTOLIC BLOOD PRESSURE: 74 MMHG | TEMPERATURE: 98.6 F

## 2018-05-02 DIAGNOSIS — E66.01 MORBID OBESITY WITH BMI OF 50.0-59.9, ADULT (HCC): ICD-10-CM

## 2018-05-02 DIAGNOSIS — G89.29 OTHER CHRONIC PAIN: Primary | ICD-10-CM

## 2018-05-02 DIAGNOSIS — M54.50 CHRONIC BILATERAL LOW BACK PAIN WITHOUT SCIATICA: ICD-10-CM

## 2018-05-02 DIAGNOSIS — F51.01 PRIMARY INSOMNIA: ICD-10-CM

## 2018-05-02 DIAGNOSIS — E03.9 ACQUIRED HYPOTHYROIDISM: ICD-10-CM

## 2018-05-02 DIAGNOSIS — F32.89 OTHER DEPRESSION: ICD-10-CM

## 2018-05-02 DIAGNOSIS — E55.9 VITAMIN D DEFICIENCY: ICD-10-CM

## 2018-05-02 DIAGNOSIS — G89.29 CHRONIC BILATERAL LOW BACK PAIN WITHOUT SCIATICA: ICD-10-CM

## 2018-05-02 DIAGNOSIS — R73.9 ELEVATED SERUM GLUCOSE: ICD-10-CM

## 2018-05-02 RX ORDER — TRAZODONE HYDROCHLORIDE 150 MG/1
150 TABLET ORAL 3 TIMES DAILY
Qty: 90 TAB | Refills: 1 | Status: SHIPPED | OUTPATIENT
Start: 2018-05-02

## 2018-05-02 NOTE — PATIENT INSTRUCTIONS
Back Pain: Care Instructions  Your Care Instructions    Back pain has many possible causes. It is often related to problems with muscles and ligaments of the back. It may also be related to problems with the nerves, discs, or bones of the back. Moving, lifting, standing, sitting, or sleeping in an awkward way can strain the back. Sometimes you don't notice the injury until later. Arthritis is another common cause of back pain. Although it may hurt a lot, back pain usually improves on its own within several weeks. Most people recover in 12 weeks or less. Using good home treatment and being careful not to stress your back can help you feel better sooner. Follow-up care is a key part of your treatment and safety. Be sure to make and go to all appointments, and call your doctor if you are having problems. It's also a good idea to know your test results and keep a list of the medicines you take. How can you care for yourself at home? · Sit or lie in positions that are most comfortable and reduce your pain. Try one of these positions when you lie down:  ¨ Lie on your back with your knees bent and supported by large pillows. ¨ Lie on the floor with your legs on the seat of a sofa or chair. Helane Gitelman on your side with your knees and hips bent and a pillow between your legs. ¨ Lie on your stomach if it does not make pain worse. · Do not sit up in bed, and avoid soft couches and twisted positions. Bed rest can help relieve pain at first, but it delays healing. Avoid bed rest after the first day of back pain. · Change positions every 30 minutes. If you must sit for long periods of time, take breaks from sitting. Get up and walk around, or lie in a comfortable position. · Try using a heating pad on a low or medium setting for 15 to 20 minutes every 2 or 3 hours. Try a warm shower in place of one session with the heating pad. · You can also try an ice pack for 10 to 15 minutes every 2 to 3 hours.  Put a thin cloth between the ice pack and your skin. · Take pain medicines exactly as directed. ¨ If the doctor gave you a prescription medicine for pain, take it as prescribed. ¨ If you are not taking a prescription pain medicine, ask your doctor if you can take an over-the-counter medicine. · Take short walks several times a day. You can start with 5 to 10 minutes, 3 or 4 times a day, and work up to longer walks. Walk on level surfaces and avoid hills and stairs until your back is better. · Return to work and other activities as soon as you can. Continued rest without activity is usually not good for your back. · To prevent future back pain, do exercises to stretch and strengthen your back and stomach. Learn how to use good posture, safe lifting techniques, and proper body mechanics. When should you call for help? Call your doctor now or seek immediate medical care if:  ? · You have new or worsening numbness in your legs. ? · You have new or worsening weakness in your legs. (This could make it hard to stand up.)   ? · You lose control of your bladder or bowels. ? Watch closely for changes in your health, and be sure to contact your doctor if:  ? · Your pain gets worse. ? · You are not getting better after 2 weeks. Where can you learn more? Go to http://flora-sarkis.info/. Enter B187 in the search box to learn more about \"Back Pain: Care Instructions. \"  Current as of: March 21, 2017  Content Version: 11.4  © 0972-7901 [employer]. Care instructions adapted under license by [employer] (which disclaims liability or warranty for this information). If you have questions about a medical condition or this instruction, always ask your healthcare professional. [place] [address] any warranty or liability for your use of this information.        Body Mass Index: Care Instructions  Your Care Instructions    Body mass index (BMI) can help you see if your weight is raising your risk for health problems. It uses a formula to compare how much you weigh with how tall you are. · A BMI lower than 18.5 is considered underweight. · A BMI between 18.5 and 24.9 is considered healthy. · A BMI between 25 and 29.9 is considered overweight. A BMI of 30 or higher is considered obese. If your BMI is in the normal range, it means that you have a lower risk for weight-related health problems. If your BMI is in the overweight or obese range, you may be at increased risk for weight-related health problems, such as high blood pressure, heart disease, stroke, arthritis or joint pain, and diabetes. If your BMI is in the underweight range, you may be at increased risk for health problems such as fatigue, lower protection (immunity) against illness, muscle loss, bone loss, hair loss, and hormone problems. BMI is just one measure of your risk for weight-related health problems. You may be at higher risk for health problems if you are not active, you eat an unhealthy diet, or you drink too much alcohol or use tobacco products. Follow-up care is a key part of your treatment and safety. Be sure to make and go to all appointments, and call your doctor if you are having problems. It's also a good idea to know your test results and keep a list of the medicines you take. How can you care for yourself at home? · Practice healthy eating habits. This includes eating plenty of fruits, vegetables, whole grains, lean protein, and low-fat dairy. · If your doctor recommends it, get more exercise. Walking is a good choice. Bit by bit, increase the amount you walk every day. Try for at least 30 minutes on most days of the week. · Do not smoke. Smoking can increase your risk for health problems. If you need help quitting, talk to your doctor about stop-smoking programs and medicines. These can increase your chances of quitting for good. · Limit alcohol to 2 drinks a day for men and 1 drink a day for women. Too much alcohol can cause health problems. If you have a BMI higher than 25  · Your doctor may do other tests to check your risk for weight-related health problems. This may include measuring the distance around your waist. A waist measurement of more than 40 inches in men or 35 inches in women can increase the risk of weight-related health problems. · Talk with your doctor about steps you can take to stay healthy or improve your health. You may need to make lifestyle changes to lose weight and stay healthy, such as changing your diet and getting regular exercise. If you have a BMI lower than 18.5  · Your doctor may do other tests to check your risk for health problems. · Talk with your doctor about steps you can take to stay healthy or improve your health. You may need to make lifestyle changes to gain or maintain weight and stay healthy, such as getting more healthy foods in your diet and doing exercises to build muscle. Where can you learn more? Go to http://flora-sarkis.info/. Enter S176 in the search box to learn more about \"Body Mass Index: Care Instructions. \"  Current as of: October 13, 2016  Content Version: 11.4  © 8287-0268 [employer]. Care instructions adapted under license by [employer] (which disclaims liability or warranty for this information). If you have questions about a medical condition or this instruction, always ask your healthcare professional. [address] any warranty or liability for your use of this information. Hypothyroidism: Care Instructions  Your Care Instructions    You have hypothyroidism, which means that your body is not making enough thyroid hormone. This hormone helps your body use energy. If your thyroid level is low, you may feel tired, be constipated, have an increase in your blood pressure, or have dry skin or memory problems. You may also get cold easily, even when it is warm.  Women with low thyroid levels may have heavy menstrual periods. A blood test to find your thyroid-stimulating hormone (TSH) level is used to check for hypothyroidism. A high TSH level may mean that you have low thyroid. When your body is not making enough thyroid hormone, TSH levels rise in an effort to make the body produce more. The treatment for hypothyroidism is to take thyroid hormone pills. You should start to feel better in 1 to 2 weeks. But it can take several months to see changes in the TSH level. You will need regular visits with your doctor to make sure you have the right dose of medicine. Most people need treatment for the rest of their lives. You will need to see your doctor regularly to have blood tests and to make sure you are doing well. Follow-up care is a key part of your treatment and safety. Be sure to make and go to all appointments, and call your doctor if you are having problems. It's also a good idea to know your test results and keep a list of the medicines you take. How can you care for yourself at home? · Take your thyroid hormone medicine exactly as prescribed. Call your doctor if you think you are having a problem with your medicine. Most people do not have side effects if they take the right amount of medicine regularly. ¨ Take the medicine 30 minutes before breakfast, and do not take it with calcium, vitamins, or iron. ¨ Do not take extra doses of your thyroid medicine. It will not help you get better any faster, and it may cause side effects. ¨ If you forget to take a dose, do NOT take a double dose of medicine. Take your usual dose the next day. · Tell your doctor about all prescription, herbal, or over-the-counter products you take. · Take care of yourself. Eat a healthy diet, get enough sleep, and get regular exercise. When should you call for help? Call 911 anytime you think you may need emergency care. For example, call if:  ? · You passed out (lost consciousness).    ? · You have severe trouble breathing. ? · You have a very slow heartbeat (less than 60 beats a minute). ? · You have a low body temperature (95°F or below). ?Call your doctor now or seek immediate medical care if:  ? · You feel tired, sluggish, or weak. ? · You have trouble remembering things or concentrating. ? · You do not begin to feel better 2 weeks after starting your medicine. ? Watch closely for changes in your health, and be sure to contact your doctor if you have any problems. Where can you learn more? Go to http://flora-sarkis.info/. Enter N963 in the search box to learn more about \"Hypothyroidism: Care Instructions. \"  Current as of: May 12, 2017  Content Version: 11.4  © 0675-4125 [employer]. Care instructions adapted under license by [employer] (which disclaims liability or warranty for this information). If you have questions about a medical condition or this instruction, always ask your healthcare professional. [address] any warranty or liability for your use of this information. Body Mass Index: Care Instructions  Your Care Instructions    Body mass index (BMI) can help you see if your weight is raising your risk for health problems. It uses a formula to compare how much you weigh with how tall you are. · A BMI lower than 18.5 is considered underweight. · A BMI between 18.5 and 24.9 is considered healthy. · A BMI between 25 and 29.9 is considered overweight. A BMI of 30 or higher is considered obese. If your BMI is in the normal range, it means that you have a lower risk for weight-related health problems. If your BMI is in the overweight or obese range, you may be at increased risk for weight-related health problems, such as high blood pressure, heart disease, stroke, arthritis or joint pain, and diabetes.  If your BMI is in the underweight range, you may be at increased risk for health problems such as fatigue, lower protection (immunity) against illness, muscle loss, bone loss, hair loss, and hormone problems. BMI is just one measure of your risk for weight-related health problems. You may be at higher risk for health problems if you are not active, you eat an unhealthy diet, or you drink too much alcohol or use tobacco products. Follow-up care is a key part of your treatment and safety. Be sure to make and go to all appointments, and call your doctor if you are having problems. It's also a good idea to know your test results and keep a list of the medicines you take. How can you care for yourself at home? · Practice healthy eating habits. This includes eating plenty of fruits, vegetables, whole grains, lean protein, and low-fat dairy. · If your doctor recommends it, get more exercise. Walking is a good choice. Bit by bit, increase the amount you walk every day. Try for at least 30 minutes on most days of the week. · Do not smoke. Smoking can increase your risk for health problems. If you need help quitting, talk to your doctor about stop-smoking programs and medicines. These can increase your chances of quitting for good. · Limit alcohol to 2 drinks a day for men and 1 drink a day for women. Too much alcohol can cause health problems. If you have a BMI higher than 25  · Your doctor may do other tests to check your risk for weight-related health problems. This may include measuring the distance around your waist. A waist measurement of more than 40 inches in men or 35 inches in women can increase the risk of weight-related health problems. · Talk with your doctor about steps you can take to stay healthy or improve your health. You may need to make lifestyle changes to lose weight and stay healthy, such as changing your diet and getting regular exercise. If you have a BMI lower than 18.5  · Your doctor may do other tests to check your risk for health problems.   · Talk with your doctor about steps you can take to stay healthy or improve your health. You may need to make lifestyle changes to gain or maintain weight and stay healthy, such as getting more healthy foods in your diet and doing exercises to build muscle. Where can you learn more? Go to http://flora-sarkis.info/. Enter S176 in the search box to learn more about \"Body Mass Index: Care Instructions. \"  Current as of: October 13, 2016  Content Version: 11.4  © 2832-6108 [employer]. Care instructions adapted under license by [employer] (which disclaims liability or warranty for this information). If you have questions about a medical condition or this instruction, always ask your healthcare professional. [place] [address] any warranty or liability for your use of this information. Back Pain: Care Instructions  Your Care Instructions    Back pain has many possible causes. It is often related to problems with muscles and ligaments of the back. It may also be related to problems with the nerves, discs, or bones of the back. Moving, lifting, standing, sitting, or sleeping in an awkward way can strain the back. Sometimes you don't notice the injury until later. Arthritis is another common cause of back pain. Although it may hurt a lot, back pain usually improves on its own within several weeks. Most people recover in 12 weeks or less. Using good home treatment and being careful not to stress your back can help you feel better sooner. Follow-up care is a key part of your treatment and safety. Be sure to make and go to all appointments, and call your doctor if you are having problems. It's also a good idea to know your test results and keep a list of the medicines you take. How can you care for yourself at home? · Sit or lie in positions that are most comfortable and reduce your pain. Try one of these positions when you lie down:  ¨ Lie on your back with your knees bent and supported by large pillows.   Madison Cano on the floor with your legs on the seat of a sofa or chair. Rik Ramp on your side with your knees and hips bent and a pillow between your legs. ¨ Lie on your stomach if it does not make pain worse. · Do not sit up in bed, and avoid soft couches and twisted positions. Bed rest can help relieve pain at first, but it delays healing. Avoid bed rest after the first day of back pain. · Change positions every 30 minutes. If you must sit for long periods of time, take breaks from sitting. Get up and walk around, or lie in a comfortable position. · Try using a heating pad on a low or medium setting for 15 to 20 minutes every 2 or 3 hours. Try a warm shower in place of one session with the heating pad. · You can also try an ice pack for 10 to 15 minutes every 2 to 3 hours. Put a thin cloth between the ice pack and your skin. · Take pain medicines exactly as directed. ¨ If the doctor gave you a prescription medicine for pain, take it as prescribed. ¨ If you are not taking a prescription pain medicine, ask your doctor if you can take an over-the-counter medicine. · Take short walks several times a day. You can start with 5 to 10 minutes, 3 or 4 times a day, and work up to longer walks. Walk on level surfaces and avoid hills and stairs until your back is better. · Return to work and other activities as soon as you can. Continued rest without activity is usually not good for your back. · To prevent future back pain, do exercises to stretch and strengthen your back and stomach. Learn how to use good posture, safe lifting techniques, and proper body mechanics. When should you call for help? Call your doctor now or seek immediate medical care if:  ? · You have new or worsening numbness in your legs. ? · You have new or worsening weakness in your legs. (This could make it hard to stand up.)   ? · You lose control of your bladder or bowels. ? Watch closely for changes in your health, and be sure to contact your doctor if:  ? · Your pain gets worse. ? · You are not getting better after 2 weeks. Where can you learn more? Go to http://flora-sarkis.info/. Enter X762 in the search box to learn more about \"Back Pain: Care Instructions. \"  Current as of: March 21, 2017  Content Version: 11.4  © 8585-4015 [employer]. Care instructions adapted under license by [employer] (which disclaims liability or warranty for this information). If you have questions about a medical condition or this instruction, always ask your healthcare professional. [place] [address] any warranty or liability for your use of this information.

## 2018-05-02 NOTE — MR AVS SNAPSHOT
Phillip Wilson [place] [id number] [address] [phone] [id number] 
[phone] Patient: Alan Clifton MRN: [id number] :1960 Visit Information Date & Time Provider Department Dept. Phone Encounter #  
 2018 11:30 AM Mirella Griffiths NP  [address] [facility] [phone] [id number] Follow-up Instructions Return if symptoms worsen or fail to improve. Your Appointments 10/31/2018  7:30 AM  
Office Visit with FARIDA Kilgore ([facility]) Appt Note: 295 [facility] 68 [facility] [address] [phone] [facility]  
  
   
 [address] Upcoming Health Maintenance Date Due  
 MEDICARE YEARLY EXAM 2018 BREAST CANCER SCRN MAMMOGRAM 2019 PAP AKA CERVICAL CYTOLOGY 2019 DTaP/Tdap/Td series (2 - Td) 10/29/2020 COLONOSCOPY 11/15/2022 Allergies as of 2018  Review Complete On: 2018 By: Kassnadra Quezada LPN Severity Noted Reaction Type Reactions Oxycodone High 10/09/2013    Anaphylaxis, Hives Adhesive Tape-silicones      Rash Paper tape is okay to use. Celebrex [Celecoxib]  10/09/2013    Hives Contrast Dye [Iodine]  2016    Nausea Only Abdominal pain, dizziness Cortisone  08/15/2013    Nausea and Vomiting Flexeril [Cyclobenzaprine]  2013    Nausea and Vomiting Pt states also causes confusion Keflex [Cephalexin]  2016    Diarrhea, Nausea Only Joint pain Current Immunizations  Reviewed on 3/29/2017 Name Date Influenza Vaccine Split 10/24/2012, 10/17/2011, 10/29/2010 TDAP Vaccine 10/29/2010 Not reviewed this visit You Were Diagnosed With   
  
 Codes Comments Other chronic pain    -  Primary ICD-10-CM: G89.29 ICD-9-CM: 338.29 Primary insomnia     ICD-10-CM: F51.01 
ICD-9-CM: 307.42  Other depression     ICD-10-CM: F32.89 
 ICD-9-CM: 506 Chronic bilateral low back pain without sciatica     ICD-10-CM: M54.5, G89.29 ICD-9-CM: 724.2, 338.29 Morbid obesity with BMI of 50.0-59.9, adult (HCC)     ICD-10-CM: E66.01, Z68.43 
ICD-9-CM: 278.01, V85.43 Acquired hypothyroidism     ICD-10-CM: E03.9 ICD-9-CM: 460. 9 Vitamin D deficiency     ICD-10-CM: E55.9 ICD-9-CM: 268.9 Elevated serum glucose     ICD-10-CM: R73.9 ICD-9-CM: 790.29 Vitals BP Pulse Temp Resp Height(growth percentile) Weight(growth percentile)  
 137/74 (BP 1 Location: Left arm, BP Patient Position: Sitting) (!) 59 98.6 °F (37 °C) (Oral) 16 6' 1\" (1.854 m) (!) 420 lb 12.8 oz (190.9 kg) LMP SpO2 BMI OB Status Smoking Status 04/07/2018 (Approximate) 97% 55.52 kg/m2 Having regular periods Never Smoker Vitals History BMI and BSA Data Body Mass Index Body Surface Area 55.52 kg/m 2 3.14 m 2 Preferred Pharmacy Pharmacy Name Phone [facility] # [phone] [facility] [phone] Your Updated Medication List  
  
   
This list is accurate as of 5/2/18 12:23 PM.  Always use your most recent med list.  
  
  
  
  
 cholecalciferol 50,000 unit capsule Commonly known as:  VITAMIN D3 Take  by mouth Every Mon, Wed & Sun.  
  
 cinnamon bark (bulk) Powd Take 40 mg by mouth daily. clotrimazole-betamethasone topical cream  
Commonly known as:  Armando [place] Apply  to affected area two (2) times a day. COQ10  PO Take 1 Tab by mouth daily. esomeprazole 40 mg capsule Commonly known as:  NexIUM Take 1 Cap by mouth daily. Indications: Heartburn FISH OIL 1,000 mg Cap Generic drug:  omega-3 fatty acids-vitamin e Take 1 Cap by mouth daily. flaxseed oil 1,000 mg Cap Take 1,000 mg by mouth daily. HYDROcodone-acetaminophen 7.5-325 mg per tablet Commonly known as:  Earl Valerio Take 1 Tab by mouth every six (6) hours as needed.  Max Daily Amount: 4 Tabs.  
  
 ibuprofen 800 mg tablet Commonly known as:  MOTRIN Take 1 Tab by mouth three (3) times daily (with meals). Indications: Pain  
  
 levothyroxine 50 mcg tablet Commonly known as:  SYNTHROID  
  
 LORazepam 1 mg tablet Commonly known as:  ATIVAN Take 1 Tab by mouth daily as needed for Anxiety (Severe). MAGNESIUM PO Take 400 mg by mouth daily. omeprazole 20 mg capsule Commonly known as:  PRILOSEC Take 1 capsule by mouth twice daily OTHER Ground seaweed - 1 tsp. Daily. psyllium packet Commonly known as:  METAMUCIL Take 1 Packet by mouth as needed. traZODone 150 mg tablet Commonly known as:  Hayde Lucas Take 1 Tab by mouth three (3) times daily. tretinoin 0.05 % topical cream  
Commonly known as:  RETIN-A Apply to affected area at night time. TURMERIC (BULK) Take 40 mg by mouth daily. VITAMIN B COMPLEX PO Take 1 Cap by mouth daily. VITAMIN C 250 mg tablet Generic drug:  ascorbic acid (vitamin C) Take 250 mg by mouth daily. vitamin e 200 unit capsule Commonly known as:  [address] Take 200 Units by mouth daily. Prescriptions Sent to Pharmacy Refills  
 traZODone (DESYREL) 150 mg tablet 1 Sig: Take 1 Tab by mouth three (3) times daily. Class: Normal  
 Pharmacy: [facility] # [address]  #: [phone] Route: Oral  
  
We Performed the Following REFERRAL TO PAIN MANAGEMENT [[id number] Custom] Comments:  
 Please evaluate patient for chronic knee and back pain Follow-up Instructions Return if symptoms worsen or fail to improve. To-Do List   
 05/02/2018 Lab:  CBC WITH AUTOMATED DIFF   
  
 05/02/2018 Lab:  HEMOGLOBIN A1C WITH EAG   
  
 05/02/2018 Lab:  LIPID PANEL   
  
 05/02/2018 Lab:  METABOLIC PANEL, COMPREHENSIVE   
  
 05/02/2018 Lab:  T4, FREE   
  
 05/02/2018   Lab:  TSH 3RD GENERATION   
  
 05/02/2018 Lab:  VITAMIN D, 25 HYDROXY Referral Information Referral ID Referred By Referred To  
  
 [id number] Martir Gallardo MD   
   [address] [place] [address] [facility] [place], [address] Phone: [phone] Fax: [phone] Visits Status Start Date End Date 1 New Request 5/2/18 5/2/19 If your referral has a status of pending review or denied, additional information will be sent to support the outcome of this decision. Patient Instructions Back Pain: Care Instructions Your Care Instructions Back pain has many possible causes. It is often related to problems with muscles and ligaments of the back. It may also be related to problems with the nerves, discs, or bones of the back. Moving, lifting, standing, sitting, or sleeping in an awkward way can strain the back. Sometimes you don't notice the injury until later. Arthritis is another common cause of back pain. Although it may hurt a lot, back pain usually improves on its own within several weeks. Most people recover in 12 weeks or less. Using good home treatment and being careful not to stress your back can help you feel better sooner. Follow-up care is a key part of your treatment and safety. Be sure to make and go to all appointments, and call your doctor if you are having problems. It's also a good idea to know your test results and keep a list of the medicines you take. How can you care for yourself at home? · Sit or lie in positions that are most comfortable and reduce your pain. Try one of these positions when you lie down: ¨ Lie on your back with your knees bent and supported by large pillows. ¨ Lie on the floor with your legs on the seat of a sofa or chair. Lyndy Newer on your side with your knees and hips bent and a pillow between your legs. ¨ Lie on your stomach if it does not make pain worse. · Do not sit up in bed, and avoid soft couches and twisted positions. Bed rest can help relieve pain at first, but it delays healing. Avoid bed rest after the first day of back pain. · Change positions every 30 minutes. If you must sit for long periods of time, take breaks from sitting. Get up and walk around, or lie in a comfortable position. · Try using a heating pad on a low or medium setting for 15 to 20 minutes every 2 or 3 hours. Try a warm shower in place of one session with the heating pad. · You can also try an ice pack for 10 to 15 minutes every 2 to 3 hours. Put a thin cloth between the ice pack and your skin. · Take pain medicines exactly as directed. ¨ If the doctor gave you a prescription medicine for pain, take it as prescribed. ¨ If you are not taking a prescription pain medicine, ask your doctor if you can take an over-the-counter medicine. · Take short walks several times a day. You can start with 5 to 10 minutes, 3 or 4 times a day, and work up to longer walks. Walk on level surfaces and avoid hills and stairs until your back is better. · Return to work and other activities as soon as you can. Continued rest without activity is usually not good for your back. · To prevent future back pain, do exercises to stretch and strengthen your back and stomach. Learn how to use good posture, safe lifting techniques, and proper body mechanics. When should you call for help? Call your doctor now or seek immediate medical care if: 
? · You have new or worsening numbness in your legs. ? · You have new or worsening weakness in your legs. (This could make it hard to stand up.) ? · You lose control of your bladder or bowels. ? Watch closely for changes in your health, and be sure to contact your doctor if: 
? · Your pain gets worse. ? · You are not getting better after 2 weeks. Where can you learn more? Go to http://flora-sarkis.info/. Enter Q596 in the search box to learn more about \"Back Pain: Care Instructions. \" Current as of: March 21, 2017 Content Version: 11.4 © 5311-1331 [employer]. Care instructions adapted under license by [employer] (which disclaims liability or warranty for this information). If you have questions about a medical condition or this instruction, always ask your healthcare professional. [address] any warranty or liability for your use of this information. Body Mass Index: Care Instructions Your Care Instructions Body mass index (BMI) can help you see if your weight is raising your risk for health problems. It uses a formula to compare how much you weigh with how tall you are. · A BMI lower than 18.5 is considered underweight. · A BMI between 18.5 and 24.9 is considered healthy. · A BMI between 25 and 29.9 is considered overweight. A BMI of 30 or higher is considered obese. If your BMI is in the normal range, it means that you have a lower risk for weight-related health problems. If your BMI is in the overweight or obese range, you may be at increased risk for weight-related health problems, such as high blood pressure, heart disease, stroke, arthritis or joint pain, and diabetes. If your BMI is in the underweight range, you may be at increased risk for health problems such as fatigue, lower protection (immunity) against illness, muscle loss, bone loss, hair loss, and hormone problems. BMI is just one measure of your risk for weight-related health problems. You may be at higher risk for health problems if you are not active, you eat an unhealthy diet, or you drink too much alcohol or use tobacco products. Follow-up care is a key part of your treatment and safety. Be sure to make and go to all appointments, and call your doctor if you are having problems. It's also a good idea to know your test results and keep a list of the medicines you take. How can you care for yourself at home? · Practice healthy eating habits. This includes eating plenty of fruits, vegetables, whole grains, lean protein, and low-fat dairy. · If your doctor recommends it, get more exercise. Walking is a good choice. Bit by bit, increase the amount you walk every day. Try for at least 30 minutes on most days of the week. · Do not smoke. Smoking can increase your risk for health problems. If you need help quitting, talk to your doctor about stop-smoking programs and medicines. These can increase your chances of quitting for good. · Limit alcohol to 2 drinks a day for men and 1 drink a day for women. Too much alcohol can cause health problems. If you have a BMI higher than 25 · Your doctor may do other tests to check your risk for weight-related health problems. This may include measuring the distance around your waist. A waist measurement of more than 40 inches in men or 35 inches in women can increase the risk of weight-related health problems. · Talk with your doctor about steps you can take to stay healthy or improve your health. You may need to make lifestyle changes to lose weight and stay healthy, such as changing your diet and getting regular exercise. If you have a BMI lower than 18.5 · Your doctor may do other tests to check your risk for health problems. · Talk with your doctor about steps you can take to stay healthy or improve your health. You may need to make lifestyle changes to gain or maintain weight and stay healthy, such as getting more healthy foods in your diet and doing exercises to build muscle. Where can you learn more? Go to http://flora-sarkis.info/. Enter S176 in the search box to learn more about \"Body Mass Index: Care Instructions. \" Current as of: October 13, 2016 Content Version: 11.4 © 0381-1280 [employer]wise[employer] Incorporated.  Care instructions adapted under license by [address] (which disclaims liability or warranty for this information). If you have questions about a medical condition or this instruction, always ask your healthcare professional. [address] any warranty or liability for your use of this information. Hypothyroidism: Care Instructions Your Care Instructions You have hypothyroidism, which means that your body is not making enough thyroid hormone. This hormone helps your body use energy. If your thyroid level is low, you may feel tired, be constipated, have an increase in your blood pressure, or have dry skin or memory problems. You may also get cold easily, even when it is warm. Women with low thyroid levels may have heavy menstrual periods. A blood test to find your thyroid-stimulating hormone (TSH) level is used to check for hypothyroidism. A high TSH level may mean that you have low thyroid. When your body is not making enough thyroid hormone, TSH levels rise in an effort to make the body produce more. The treatment for hypothyroidism is to take thyroid hormone pills. You should start to feel better in 1 to 2 weeks. But it can take several months to see changes in the TSH level. You will need regular visits with your doctor to make sure you have the right dose of medicine. Most people need treatment for the rest of their lives. You will need to see your doctor regularly to have blood tests and to make sure you are doing well. Follow-up care is a key part of your treatment and safety. Be sure to make and go to all appointments, and call your doctor if you are having problems. It's also a good idea to know your test results and keep a list of the medicines you take. How can you care for yourself at home? · Take your thyroid hormone medicine exactly as prescribed. Call your doctor if you think you are having a problem with your medicine.  Most people do not have side effects if they take the right amount of medicine regularly. ¨ Take the medicine 30 minutes before breakfast, and do not take it with calcium, vitamins, or iron. ¨ Do not take extra doses of your thyroid medicine. It will not help you get better any faster, and it may cause side effects. ¨ If you forget to take a dose, do NOT take a double dose of medicine. Take your usual dose the next day. · Tell your doctor about all prescription, herbal, or over-the-counter products you take. · Take care of yourself. Eat a healthy diet, get enough sleep, and get regular exercise. When should you call for help? Call 911 anytime you think you may need emergency care. For example, call if: 
? · You passed out (lost consciousness). ? · You have severe trouble breathing. ? · You have a very slow heartbeat (less than 60 beats a minute). ? · You have a low body temperature (95°F or below). ?Call your doctor now or seek immediate medical care if: 
? · You feel tired, sluggish, or weak. ? · You have trouble remembering things or concentrating. ? · You do not begin to feel better 2 weeks after starting your medicine. ? Watch closely for changes in your health, and be sure to contact your doctor if you have any problems. Where can you learn more? Go to http://flora-sarkis.info/. Enter Y136 in the search box to learn more about \"Hypothyroidism: Care Instructions. \" Current as of: May 12, 2017 Content Version: 11.4 © 7423-0103 [employer]. Care instructions adapted under license by [employer] (which disclaims liability or warranty for this information). If you have questions about a medical condition or this instruction, always ask your healthcare professional. [address] any warranty or liability for your use of this information. Body Mass Index: Care Instructions Your Care Instructions Body mass index (BMI) can help you see if your weight is raising your risk for health problems. It uses a formula to compare how much you weigh with how tall you are. · A BMI lower than 18.5 is considered underweight. · A BMI between 18.5 and 24.9 is considered healthy. · A BMI between 25 and 29.9 is considered overweight. A BMI of 30 or higher is considered obese. If your BMI is in the normal range, it means that you have a lower risk for weight-related health problems. If your BMI is in the overweight or obese range, you may be at increased risk for weight-related health problems, such as high blood pressure, heart disease, stroke, arthritis or joint pain, and diabetes. If your BMI is in the underweight range, you may be at increased risk for health problems such as fatigue, lower protection (immunity) against illness, muscle loss, bone loss, hair loss, and hormone problems. BMI is just one measure of your risk for weight-related health problems. You may be at higher risk for health problems if you are not active, you eat an unhealthy diet, or you drink too much alcohol or use tobacco products. Follow-up care is a key part of your treatment and safety. Be sure to make and go to all appointments, and call your doctor if you are having problems. It's also a good idea to know your test results and keep a list of the medicines you take. How can you care for yourself at home? · Practice healthy eating habits. This includes eating plenty of fruits, vegetables, whole grains, lean protein, and low-fat dairy. · If your doctor recommends it, get more exercise. Walking is a good choice. Bit by bit, increase the amount you walk every day. Try for at least 30 minutes on most days of the week. · Do not smoke. Smoking can increase your risk for health problems. If you need help quitting, talk to your doctor about stop-smoking programs and medicines. These can increase your chances of quitting for good. · Limit alcohol to 2 drinks a day for men and 1 drink a day for women. Too much alcohol can cause health problems. If you have a BMI higher than 25 · Your doctor may do other tests to check your risk for weight-related health problems. This may include measuring the distance around your waist. A waist measurement of more than 40 inches in men or 35 inches in women can increase the risk of weight-related health problems. · Talk with your doctor about steps you can take to stay healthy or improve your health. You may need to make lifestyle changes to lose weight and stay healthy, such as changing your diet and getting regular exercise. If you have a BMI lower than 18.5 · Your doctor may do other tests to check your risk for health problems. · Talk with your doctor about steps you can take to stay healthy or improve your health. You may need to make lifestyle changes to gain or maintain weight and stay healthy, such as getting more healthy foods in your diet and doing exercises to build muscle. Where can you learn more? Go to http://flora-sarkis.info/. Enter S176 in the search box to learn more about \"Body Mass Index: Care Instructions. \" Current as of: October 13, 2016 Content Version: 11.4 © 4766-0114 [employer] Care instructions adapted under license by [employer] (which disclaims liability or warranty for this information). If you have questions about a medical condition or this instruction, always ask your healthcare professional. [place] [address] any warranty or liability for your use of this information. Back Pain: Care Instructions Your Care Instructions Back pain has many possible causes. It is often related to problems with muscles and ligaments of the back. It may also be related to problems with the nerves, discs, or bones of the back.  Moving, lifting, standing, sitting, or sleeping in an awkward way can strain the back. Sometimes you don't notice the injury until later. Arthritis is another common cause of back pain. Although it may hurt a lot, back pain usually improves on its own within several weeks. Most people recover in 12 weeks or less. Using good home treatment and being careful not to stress your back can help you feel better sooner. Follow-up care is a key part of your treatment and safety. Be sure to make and go to all appointments, and call your doctor if you are having problems. It's also a good idea to know your test results and keep a list of the medicines you take. How can you care for yourself at home? · Sit or lie in positions that are most comfortable and reduce your pain. Try one of these positions when you lie down: ¨ Lie on your back with your knees bent and supported by large pillows. ¨ Lie on the floor with your legs on the seat of a sofa or chair. Sonda Chaz on your side with your knees and hips bent and a pillow between your legs. ¨ Lie on your stomach if it does not make pain worse. · Do not sit up in bed, and avoid soft couches and twisted positions. Bed rest can help relieve pain at first, but it delays healing. Avoid bed rest after the first day of back pain. · Change positions every 30 minutes. If you must sit for long periods of time, take breaks from sitting. Get up and walk around, or lie in a comfortable position. · Try using a heating pad on a low or medium setting for 15 to 20 minutes every 2 or 3 hours. Try a warm shower in place of one session with the heating pad. · You can also try an ice pack for 10 to 15 minutes every 2 to 3 hours. Put a thin cloth between the ice pack and your skin. · Take pain medicines exactly as directed. ¨ If the doctor gave you a prescription medicine for pain, take it as prescribed. ¨ If you are not taking a prescription pain medicine, ask your doctor if you can take an over-the-counter medicine. · Take short walks several times a day. You can start with 5 to 10 minutes, 3 or 4 times a day, and work up to longer walks. Walk on level surfaces and avoid hills and stairs until your back is better. · Return to work and other activities as soon as you can. Continued rest without activity is usually not good for your back. · To prevent future back pain, do exercises to stretch and strengthen your back and stomach. Learn how to use good posture, safe lifting techniques, and proper body mechanics. When should you call for help? Call your doctor now or seek immediate medical care if: 
? · You have new or worsening numbness in your legs. ? · You have new or worsening weakness in your legs. (This could make it hard to stand up.) ? · You lose control of your bladder or bowels. ? Watch closely for changes in your health, and be sure to contact your doctor if: 
? · Your pain gets worse. ? · You are not getting better after 2 weeks. Where can you learn more? Go to http://flora-sarkis.info/. Enter S374 in the search box to learn more about \"Back Pain: Care Instructions. \" Current as of: March 21, 2017 Content Version: 11.4 © 4736-9374 [employer]. Care instructions adapted under license by [employer] (which disclaims liability or warranty for this information). If you have questions about a medical condition or this instruction, always ask your healthcare professional. [place] [address] any warranty or liability for your use of this information. Introducing [facility]! Dear Maverick oL: Thank you for requesting a [employer] account. Our records indicate that you already have an active [employer] account. You can access your account anytime at [email or web address] Did you know that you can access your hospital and ER discharge instructions at any time in [employer]?   You can also review all of your test results from your hospital stay or ER visit. Additional Information If you have questions, please visit the Frequently Asked Questions section of the [employer] website at [email or web address] Remember, [employer] is NOT to be used for urgent needs. For medical emergencies, dial 911. Now available from your iPhone and Android! Please provide this summary of care documentation to your next provider. Your primary care clinician is listed as Paz Lay. If you have any questions after today's visit, please call [phone].

## 2018-05-02 NOTE — PROGRESS NOTES
Chief Complaint   Patient presents with    Medication Refill     1. Have you been to the ER, urgent care clinic since your last visit? Hospitalized since your last visit? No    2. Have you seen or consulted any other health care providers outside of the [address] [place] [address] since your last visit? Include any pap smears or colon screening.  No
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
## 2018-05-10 ENCOUNTER — HOSPITAL ENCOUNTER (OUTPATIENT)
Dept: LAB | Age: 58
Discharge: HOME | End: 2018-05-10
Payer: MEDICARE

## 2018-05-10 ENCOUNTER — TELEPHONE (OUTPATIENT)
Dept: FAMILY MEDICINE CLINIC | Age: 58
End: 2018-05-10

## 2018-05-10 DIAGNOSIS — R73.9 ELEVATED SERUM GLUCOSE: ICD-10-CM

## 2018-05-10 DIAGNOSIS — E66.01 MORBID OBESITY WITH BMI OF 50.0-59.9, ADULT (HCC): ICD-10-CM

## 2018-05-10 DIAGNOSIS — E55.9 VITAMIN D DEFICIENCY: ICD-10-CM

## 2018-05-10 DIAGNOSIS — E03.9 ACQUIRED HYPOTHYROIDISM: ICD-10-CM

## 2018-05-10 LAB
ALBUMIN SERPL-MCNC: 3.6 G/DL (ref 3.4–5)
ALBUMIN/GLOB SERPL: 1.1 {RATIO} (ref 0.8–1.7)
ALP SERPL-CCNC: 71 U/L (ref 45–117)
ALT SERPL-CCNC: 24 U/L (ref 13–56)
ANION GAP SERPL CALC-SCNC: 7 MMOL/L (ref 3–18)
AST SERPL-CCNC: 18 U/L (ref 15–37)
BASOPHILS # BLD: 0 K/UL (ref 0–0.06)
BASOPHILS NFR BLD: 0 % (ref 0–2)
BILIRUB SERPL-MCNC: 0.5 MG/DL (ref 0.2–1)
BUN SERPL-MCNC: 14 MG/DL (ref 7–18)
BUN/CREAT SERPL: 14 (ref 12–20)
CALCIUM SERPL-MCNC: 8.3 MG/DL (ref 8.5–10.1)
CHLORIDE SERPL-SCNC: 103 MMOL/L (ref 100–108)
CHOLEST SERPL-MCNC: 276 MG/DL
CO2 SERPL-SCNC: 28 MMOL/L (ref 21–32)
CREAT SERPL-MCNC: 1 MG/DL (ref 0.6–1.3)
DIFFERENTIAL METHOD BLD: ABNORMAL
EOSINOPHIL # BLD: 0.2 K/UL (ref 0–0.4)
EOSINOPHIL NFR BLD: 2 % (ref 0–5)
ERYTHROCYTE [DISTWIDTH] IN BLOOD BY AUTOMATED COUNT: 15 % (ref 11.6–14.5)
EST. AVERAGE GLUCOSE BLD GHB EST-MCNC: 117 MG/DL
GLOBULIN SER CALC-MCNC: 3.3 G/DL (ref 2–4)
GLUCOSE SERPL-MCNC: 95 MG/DL (ref 74–99)
HBA1C MFR BLD: 5.7 % (ref 4.2–5.6)
HCT VFR BLD AUTO: 43.6 % (ref 35–45)
HDLC SERPL-MCNC: 35 MG/DL (ref 40–60)
HDLC SERPL: 7.9 {RATIO} (ref 0–5)
HGB BLD-MCNC: 14.5 G/DL (ref 12–16)
LDLC SERPL CALC-MCNC: 170.4 MG/DL (ref 0–100)
LIPID PROFILE,FLP: ABNORMAL
LYMPHOCYTES # BLD: 1.8 K/UL (ref 0.9–3.6)
LYMPHOCYTES NFR BLD: 24 % (ref 21–52)
MCH RBC QN AUTO: 29.2 PG (ref 24–34)
MCHC RBC AUTO-ENTMCNC: 33.3 G/DL (ref 31–37)
MCV RBC AUTO: 87.7 FL (ref 74–97)
MONOCYTES # BLD: 0.5 K/UL (ref 0.05–1.2)
MONOCYTES NFR BLD: 6 % (ref 3–10)
NEUTS SEG # BLD: 5.3 K/UL (ref 1.8–8)
NEUTS SEG NFR BLD: 68 % (ref 40–73)
PLATELET # BLD AUTO: 295 K/UL (ref 135–420)
PMV BLD AUTO: 10.1 FL (ref 9.2–11.8)
POTASSIUM SERPL-SCNC: 4.5 MMOL/L (ref 3.5–5.5)
PROT SERPL-MCNC: 6.9 G/DL (ref 6.4–8.2)
RBC # BLD AUTO: 4.97 M/UL (ref 4.2–5.3)
SODIUM SERPL-SCNC: 138 MMOL/L (ref 136–145)
T4 FREE SERPL-MCNC: 1.1 NG/DL (ref 0.7–1.5)
TRIGL SERPL-MCNC: 353 MG/DL (ref ?–150)
TSH SERPL DL<=0.05 MIU/L-ACNC: 1.41 UIU/ML (ref 0.36–3.74)
VLDLC SERPL CALC-MCNC: 70.6 MG/DL
WBC # BLD AUTO: 7.8 K/UL (ref 4.6–13.2)

## 2018-05-10 PROCEDURE — 85025 COMPLETE CBC W/AUTO DIFF WBC: CPT | Performed by: NURSE PRACTITIONER

## 2018-05-10 PROCEDURE — 36415 COLL VENOUS BLD VENIPUNCTURE: CPT | Performed by: NURSE PRACTITIONER

## 2018-05-10 PROCEDURE — 83036 HEMOGLOBIN GLYCOSYLATED A1C: CPT | Performed by: NURSE PRACTITIONER

## 2018-05-10 PROCEDURE — 84439 ASSAY OF FREE THYROXINE: CPT | Performed by: NURSE PRACTITIONER

## 2018-05-10 PROCEDURE — 82306 VITAMIN D 25 HYDROXY: CPT | Performed by: NURSE PRACTITIONER

## 2018-05-10 PROCEDURE — 84443 ASSAY THYROID STIM HORMONE: CPT | Performed by: NURSE PRACTITIONER

## 2018-05-10 PROCEDURE — 80053 COMPREHEN METABOLIC PANEL: CPT | Performed by: NURSE PRACTITIONER

## 2018-05-10 PROCEDURE — 80061 LIPID PANEL: CPT | Performed by: NURSE PRACTITIONER

## 2018-05-10 NOTE — TELEPHONE ENCOUNTER
Dr. Maude Hendrix is certified for pain management only permittedjfor 1 shot.    Call her back at [phone]

## 2018-05-10 NOTE — TELEPHONE ENCOUNTER
Verified patient by full name and date of birth. Patient states the place she was referred for pain management does not have a provider who can treat her for what she needs until 09/2018. She would like to be referred somewhere else and she would like a copy of her referral mailed to her home address. Referral requisition along with provider notes sent to Dr. Brit Bennett office and fax confirmation received. Referral placed in mail to patient home address on file.

## 2018-05-11 ENCOUNTER — TELEPHONE (OUTPATIENT)
Dept: FAMILY MEDICINE CLINIC | Age: 58
End: 2018-05-11

## 2018-05-11 LAB — 25(OH)D3 SERPL-MCNC: 80.1 NG/ML (ref 30–100)

## 2018-05-11 NOTE — TELEPHONE ENCOUNTER
----- Message from Mirza Vásquez NP sent at 5/11/2018 12:27 PM EDT -----  Cholesterol worsen- strongly advise starting a statin  a1c-5.7 predm- mange with diet and exercise

## 2018-05-14 NOTE — TELEPHONE ENCOUNTER
Verified patient by full name and date of birth. Notified patient of lab results and provider recommendations. Patient states she is not interested in taking a statin at this time and will manage her cholesterol with diet changes. Patient states she has concerns about her RDW being high and her Calcium being low. She would like further explanation. Please advise.

## 2018-05-17 ENCOUNTER — OFFICE VISIT (OUTPATIENT)
Dept: PAIN MANAGEMENT | Age: 58
End: 2018-05-17

## 2018-05-17 ENCOUNTER — TELEPHONE (OUTPATIENT)
Dept: FAMILY MEDICINE CLINIC | Age: 58
End: 2018-05-17

## 2018-05-17 ENCOUNTER — HOSPITAL ENCOUNTER (OUTPATIENT)
Age: 58
Discharge: HOME | End: 2018-05-17
Attending: PHYSICAL MEDICINE & REHABILITATION
Payer: MEDICARE

## 2018-05-17 VITALS
WEIGHT: 293 LBS | HEIGHT: 72 IN | TEMPERATURE: 97.8 F | DIASTOLIC BLOOD PRESSURE: 89 MMHG | BODY MASS INDEX: 39.68 KG/M2 | HEART RATE: 78 BPM | SYSTOLIC BLOOD PRESSURE: 135 MMHG | RESPIRATION RATE: 16 BRPM

## 2018-05-17 DIAGNOSIS — M47.816 LUMBAR SPONDYLOSIS: ICD-10-CM

## 2018-05-17 DIAGNOSIS — M47.816 LUMBAR FACET ARTHROPATHY: ICD-10-CM

## 2018-05-17 DIAGNOSIS — M25.561 CHRONIC PAIN OF RIGHT KNEE: ICD-10-CM

## 2018-05-17 DIAGNOSIS — M17.0 PRIMARY OSTEOARTHRITIS OF BOTH KNEES: Primary | ICD-10-CM

## 2018-05-17 DIAGNOSIS — M51.36 LUMBAR DEGENERATIVE DISC DISEASE: ICD-10-CM

## 2018-05-17 DIAGNOSIS — E66.01 MORBID OBESITY (HCC): ICD-10-CM

## 2018-05-17 DIAGNOSIS — F50.89 COMPULSIVE OVEREATING: ICD-10-CM

## 2018-05-17 DIAGNOSIS — G89.29 CHRONIC PAIN OF RIGHT KNEE: ICD-10-CM

## 2018-05-17 DIAGNOSIS — G89.4 CHRONIC PAIN SYNDROME: ICD-10-CM

## 2018-05-17 DIAGNOSIS — G89.29 CHRONIC PAIN OF LEFT KNEE: ICD-10-CM

## 2018-05-17 DIAGNOSIS — M25.562 CHRONIC PAIN OF LEFT KNEE: ICD-10-CM

## 2018-05-17 PROCEDURE — 72148 MRI LUMBAR SPINE W/O DYE: CPT

## 2018-05-17 NOTE — TELEPHONE ENCOUNTER
Available [id number] and 2:00 and after 4:00 but she said that you can leave a voice mail it is secure.
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
## 2018-05-21 RX ORDER — DIAZEPAM 5 MG/1
10 TABLET ORAL ONCE
Status: CANCELLED | OUTPATIENT
Start: 2018-05-22 | End: 2018-05-22

## 2018-05-22 ENCOUNTER — APPOINTMENT (OUTPATIENT)
Dept: GENERAL RADIOLOGY | Age: 58
End: 2018-05-22
Attending: PHYSICAL MEDICINE & REHABILITATION
Payer: MEDICARE

## 2018-05-22 ENCOUNTER — HOSPITAL ENCOUNTER (OUTPATIENT)
Age: 58
Setting detail: OUTPATIENT SURGERY
Discharge: HOME | End: 2018-05-22
Attending: PHYSICAL MEDICINE & REHABILITATION | Admitting: PHYSICAL MEDICINE & REHABILITATION
Payer: MEDICARE

## 2018-05-22 VITALS
RESPIRATION RATE: 18 BRPM | OXYGEN SATURATION: 99 % | DIASTOLIC BLOOD PRESSURE: 80 MMHG | SYSTOLIC BLOOD PRESSURE: 140 MMHG | BODY MASS INDEX: 39.68 KG/M2 | HEART RATE: 61 BPM | HEIGHT: 72 IN | TEMPERATURE: 98.6 F | WEIGHT: 293 LBS

## 2018-05-22 PROCEDURE — 74011000250 HC RX REV CODE- 250

## 2018-05-22 PROCEDURE — 76010000009 HC PAIN MGT 0 TO 30 MIN PROC: Performed by: PHYSICAL MEDICINE & REHABILITATION

## 2018-05-22 PROCEDURE — 77030003672 HC NDL SPN HALY -A: Performed by: PHYSICAL MEDICINE & REHABILITATION

## 2018-05-22 PROCEDURE — 74011250636 HC RX REV CODE- 250/636

## 2018-05-22 PROCEDURE — 74011250636 HC RX REV CODE- 250/636: Performed by: PHYSICAL MEDICINE & REHABILITATION

## 2018-05-22 PROCEDURE — 74011250637 HC RX REV CODE- 250/637: Performed by: PHYSICAL MEDICINE & REHABILITATION

## 2018-05-22 RX ORDER — LIDOCAINE HYDROCHLORIDE 10 MG/ML
INJECTION, SOLUTION EPIDURAL; INFILTRATION; INTRACAUDAL; PERINEURAL AS NEEDED
Status: DISCONTINUED | OUTPATIENT
Start: 2018-05-22 | End: 2018-05-22 | Stop reason: HOSPADM

## 2018-05-22 RX ORDER — ROPIVACAINE HYDROCHLORIDE 2 MG/ML
INJECTION, SOLUTION EPIDURAL; INFILTRATION; PERINEURAL AS NEEDED
Status: DISCONTINUED | OUTPATIENT
Start: 2018-05-22 | End: 2018-05-22 | Stop reason: HOSPADM

## 2018-05-22 RX ORDER — DIAZEPAM 5 MG/1
10 TABLET ORAL ONCE
Status: COMPLETED | OUTPATIENT
Start: 2018-05-22 | End: 2018-05-22

## 2018-05-22 RX ADMIN — DIAZEPAM 10 MG: 5 TABLET ORAL at 11:40

## 2018-05-22 NOTE — DISCHARGE INSTRUCTIONS
[address] [place] [address] for Pain Management      Post Procedures Instructions    *Resume Diet and Activity as tolerated. Rest for the remainder of the day. *You may fell worse before you feel better as the numbing medications wear off before the steroids take effect if used for your procedures. *Do not use affected extremity until numbness or loss of sensation has completely resolved without assistance. *DO NOT DRIVE, operate machinery/heavey equipment for 24 hours. *DO NOT DRINK ALCOHOL for 24 hours as it may interact with the sedation if you received it and also thins your blood and may cause you to bleed. *WAIT 24 hours before starting back ANY Blood thinning medications:   (Heparin, Coumadin, Warfarin, Lovenox, Plavix, Aggrenox)    *Resume Pre-Procedure Medications as prescribed except Blood Thinners unless directed by your Physician or Cardiologist.     *Avoid Hot tubs and Heating pad for 24 hours to prevent dissipation of medications, you may shower to remove bandages and remaining prep residue on the skin. * If you develop a Headache, drink plenty of fluids including beverages with caffeine (Coffee, Mt. Dew etc.) and rest.  If the headache persists longer than 24 hoursor intensifies - Please call [facility] ([facility]) [phone]    * If you are DIABETIC, check your blood sugar three times a day for the next three days, the steroids will increase your blood sugar. If your blood sugar is greater than 400 have someone drive you to the nearest [address]. * If you experience any of the following problems, call the [facility] [phone] between 8:00 am - 4:30pm or After Hours [phone].     Shortness of breath    Fever of 101 F or higher    Nausea / Vomiting (not normal to you)    Increasing stiffness in the neck    Weakness or numbness in the arms or legs that is not resolving    Prolonged and increasing pain > than 4 days    ANYTHING OUT of the ORDINARY TO YOU    If YOU are experiencing a severe reaction / complication that you have never had before post procedure, call 911 or go to the nearest emergency room! All patients must have a  for transportation [place] regardless if you do or do not receive sedation. DISCHARGE SUMMARY from Nurse      PATIENT INSTRUCTIONS:    After Oral  or intravenous sedation, for 24 hours or while taking prescription Narcotics:  · Limit your activities  · Do not drive and operate hazardous machinery  · Do not make important personal or business decisions  · Do  not drink alcoholic beverages  · If you have not urinated within 8 hours after discharge, please contact your surgeon on call. Report the following to your surgeon:  · Excessive pain, swelling, redness or odor of or around the surgical area  · Temperature over 101  · Nausea and vomiting lasting longer than 4 hours or if unable to take medications  · Any signs of decreased circulation or nerve impairment to extremity: change in color, persistent  numbness, tingling, coldness or increase pain  · Any questions        What to do at Home:  Recommended activity: Activity as tolerated, NO DRIVING FOR 24 Hours post injection          *  Please give a list of your current medications to your Primary Care Provider. *  Please update this list whenever your medications are discontinued, doses are      changed, or new medications (including over-the-counter products) are added. *  Please carry medication information at all times in case of emergency situations. These are general instructions for a healthy lifestyle:    No smoking/ No tobacco products/ Avoid exposure to second hand smoke    Surgeon General's Warning:  Quitting smoking now greatly reduces serious risk to your health.     Obesity, smoking, and sedentary lifestyle greatly increases your risk for illness    A healthy diet, regular physical exercise & weight monitoring are important for maintaining a healthy lifestyle    You may be retaining fluid if you have a history of heart failure or if you experience any of the following symptoms:  Weight gain of 3 pounds or more overnight or 5 pounds in a week, increased swelling in our hands or feet or shortness of breath while lying flat in bed. Please call your doctor as soon as you notice any of these symptoms; do not wait until your next office visit. Recognize signs and symptoms of STROKE:    F-face looks uneven    A-arms unable to move or move unevenly    S-speech slurred or non-existent    T-time-call 911 as soon as signs and symptoms begin-DO NOT go       Back to bed or wait to see if you get better-TIME IS BRAIN.

## 2018-05-22 NOTE — PROCEDURES
[facility]    DIAGNOSTIC GENICULAR BLOCKS   PROCEDURE REPORT      PATIENT:  Migue Renteria  YOB: 1960  DATE OF SERVICE:  5/22/2018  [facility]    PRE-PROCEDURE DIAGNOSIS:  See Above    POST-PROCEDURE DIAGNOSIS:  See Above                PROCEDURE:  1. Bilateral diagnostic genicular branch blocks of the vastus lateralis, vastus medialis, and saphenous nerves (  64450 x 3,  50 )   2. Fluoroscopic needle guidance (45203)      LEVELS TREATED:  Bilateral sided genicular branch blocks of the vastus lateralis, vastus medialis, and saphenous nerves      ANESTHESIA:  See Medication Administration Record    COMPLICATIONS: None. PHYSICIAN:  Adrian Goodwin MD    PRE-PROCEDURE NOTE:  Pre-procedural assessment of the patient was performed including a limited history and physical examination. The details of the procedure were discussed with the patient, including the risks, benefits and alternative options and an informed consent was obtained. The patients NPO status, if necessary for the specific procedure and/or administration of moderate intravenous sedation, if utilized, and availability of a responsible adult to escort the patient following the procedure were confirmed. PROCEDURE NOTE:  The patient was brought to the procedure suite and positioned on the fluoroscopy table in the supine position. Physiologic monitors were applied and supplemental oxygen was administered via nasal cannula. The skin was prepped in the standard surgical fashion and sterile drapes were applied over the procedure site. Please refer to the Flowsheet for documentation of the patients vital signs and the Medication Administration Record for any oral and/or intravenous sedation administered prior to or during the procedure. 1% Lidocaine was utilized for local anesthesia.  Under AP fluoroscopic guidance, 25-gauge, 3-1/2 inch short bevel regional block needles were placed at the anatomic location of the genicular branch nerves of the vastus lateralis, vastus medialis, and saphenous nerves. Confirmation of correct needle placement was obtained through AP and lateral fluoroscopic imaging of the respective knee. After all needles were placed,  2 mL of 0.2% ropivacaine was injected at each location after the negative aspiration of blood. The needles were removed and the stilets were replaced. The procedure was performed on the contralateral side in the same fashion and at the same levels using the same volume of local anesthetic following negative aspiration of blood. The needles were removed intact. The area was thoroughly cleaned and sterile bandages applied as necessary. The patient tolerated the procedures well and vital signs remained stable throughout the procedures. The patient was assessed immediately following the procedure and was noted to have greater than 50% reduction in pain (a reduction from 8/10 to 2/10 in severity of genicular pain). Based on these results, the patient is considered an appropriate candidate for radiofrequency ablation of the above-mentioned genicular branch nerves of the respective knees, and will be scheduled for these procedures. The total genicular branch nerves successfully blocked were 3 genicular nerves,  both knees. POST-PROCEDURE COURSE:  The patient was escorted from the procedure suite in satisfactory condition and recovered per facility protocol based on the type of procedure performed and/or the sedation utilized. The patient did not experience any adverse events and remained hemodynamically stable during the post-procedure period. DISCHARGE NOTE:  Upon discharge, the patient was able to tolerate fluids and was in no acute distress. The patient was oriented to person, place and time and vital signs were stable. Appropriate post-procedure instructions were provided and explained to the patient in detail and all questions were answered.     John Betancur MD 5/22/2018 12:29 PM

## 2018-05-22 NOTE — INTERVAL H&P NOTE
H&P Update:  Vicie Krabbe was seen and examined. History and physical has been reviewed. The patient has been examined. There have been no significant clinical changes since the completion of the originally dated History and Physical.    [facility]  Brief Pre-Procedure History & Physical    PATIENT NAME:  Vicie Krabbe   YOB: 1960  DATE OF SERVICE:  5/22/2018      CHIEF COMPLAINT:  Pain    HISTORY OF PRESENT ILLNESS:  Vicie Krabbe presents today for a previously diagnosed problem contributing to some or all of this patients pain. The location and pattern of the pain has not changed substantially since the last visit in our office. No other significant medical changes have occurred in the last 30 days. PAST MEDICAL HISTORY:  The patient  has a past medical history of Adverse effect of anesthesia; Anemia; Aortic aneurysm ([address] (3/2016); Arrhythmia (2013); Cardiac echocardiogram (08/26/2016); Chronic anal fissure; Chronic pain; Compulsive overeating (1/28/2010); Dental disorder; Depression (1/28/2010); Fatty liver; Folliculitis; Hypercholesterolemia; Hypertension; Hypoglycemia; IBS (irritable bowel syndrome); Insomnia; Left breast mass; Morbid obesity ([address] (1/28/2010); Multiple thyroid nodules; Osteoarthritis of multiple joints; PCOS (polycystic ovarian syndrome); Prediabetes; PTSD (post-traumatic stress disorder); Rectal fissure; Stool color black; and Unspecified adverse effect of anesthesia. She also has no past medical history of Burning with urination; Calculus of kidney; or Headache.     PAST SURGICAL HISTORY:  The patient  has a past surgical history that includes laparoscopy abdomen diagnostic (1998); hx colonoscopy (2008); hx dilation and curettage (1997); hx pelvic laparoscopy; hx partial thyroidectomy (Left, right/ March 2017); hx knee arthroscopy (Right, 2013); hx tonsillectomy (1976); hx hernia repair (1997); and colonoscopy (N/A, 11/15/2017). CURRENT MEDICATIONS:  See Medication Administration Record [facility]) in the patient's electronic record. ALLERGIES:    Allergies   Allergen Reactions    Oxycodone Anaphylaxis and Hives    Adhesive Tape-Silicones Rash     Paper tape is okay to use.  Celebrex [Celecoxib] Hives    Contrast Dye [Iodine] Nausea Only     Abdominal pain, dizziness    Cortisone Nausea and Vomiting    Flexeril [Cyclobenzaprine] Nausea and Vomiting     Pt states also causes confusion    Keflex [Cephalexin] Diarrhea and Nausea Only     Joint pain       FAMILY HISTORY:  The patient family history includes Cancer in her paternal uncle; Dementia in her mother; Diabetes in her father; Heart Disease in her father; Hypertension in her mother; Stroke in her mother. SOCIAL HISTORY:  The patient  reports that she has never smoked. She has never used smokeless tobacco. The patient  reports that she drinks alcohol. She  reports that she does not use illicit drugs. REVIEW OF SYSTEMS:  Christian Mccoy denies any fever, chills, unexplained weight loss, use of antibiotics for recent infection or bleeding abnormalities. PHYSICAL EXAM:  VS:   Visit Vitals    LMP 04/27/2018     Gen: Well-developed. Body habitus consistent with recorded height and weight and the calculated BMI. No apparent distress. Head: Normocephalic, atraumatic. Skin: No obvious rashes, lesions or infection. Pulm: Respirations are even and unlabored. Psych:    Mood, affect and speech - Appropriate. ASSESSMENT:   1. Stable for bilateral knee diagnostic nerve blocks interventional pain procedure as discussed. PLAN:  Proceed with scheduled procedure.      Linda Contreras MD 5/22/2018 11:27 AM          Signed By: Linda Contreras MD     May 22, 2018 11:26 AM

## 2018-05-22 NOTE — H&P (VIEW-ONLY)
The pt is here today to discuss procedure options with Dr. Melquiades Mcpherson. Pt rates pain as 8/10.

## 2018-06-05 ENCOUNTER — TELEPHONE (OUTPATIENT)
Dept: PAIN MANAGEMENT | Age: 58
End: 2018-06-05

## 2018-06-05 NOTE — TELEPHONE ENCOUNTER
Ms. Kim Hightower was contacted for follow-up status post Bilateral diagnostic genicular branch blocks of the vastus lateralis, vastus medialis, and saphenous nerves  on May 22, 2018.  She reports:    Pre-procedure numerical pain score: 7/10  Post-procedure numerical pain score immediately after: 9/10  Duration of relief post-procedure (if applicable): 0     Improvement in functional activities (if applicable): No  Percentage of overall improvement: 0%    COMMENTS:

## 2018-06-25 ENCOUNTER — OFFICE VISIT (OUTPATIENT)
Dept: FAMILY MEDICINE CLINIC | Age: 58
End: 2018-06-25

## 2018-06-25 ENCOUNTER — HOSPITAL ENCOUNTER (OUTPATIENT)
Dept: LAB | Age: 58
Discharge: HOME | End: 2018-06-25
Payer: MEDICARE

## 2018-06-25 VITALS
RESPIRATION RATE: 18 BRPM | WEIGHT: 293 LBS | SYSTOLIC BLOOD PRESSURE: 142 MMHG | BODY MASS INDEX: 39.68 KG/M2 | HEIGHT: 72 IN | TEMPERATURE: 98.7 F | DIASTOLIC BLOOD PRESSURE: 73 MMHG | OXYGEN SATURATION: 96 % | HEART RATE: 60 BPM

## 2018-06-25 DIAGNOSIS — Z72.51 UNPROTECTED SEX: ICD-10-CM

## 2018-06-25 DIAGNOSIS — R30.0 DYSURIA: Primary | ICD-10-CM

## 2018-06-25 LAB
BILIRUB UR QL STRIP: NEGATIVE
GLUCOSE UR-MCNC: NEGATIVE MG/DL
KETONES P FAST UR STRIP-MCNC: NEGATIVE MG/DL
PH UR STRIP: 6 [PH] (ref 4.6–8)
PROT UR QL STRIP: NEGATIVE
RPR SER QL: NONREACTIVE
SP GR UR STRIP: 1.02 (ref 1–1.03)
UA UROBILINOGEN AMB POC: NORMAL (ref 0.2–1)
URINALYSIS CLARITY POC: CLEAR
URINALYSIS COLOR POC: YELLOW
URINE BLOOD POC: NORMAL
URINE LEUKOCYTES POC: NORMAL
URINE NITRITES POC: NEGATIVE

## 2018-06-25 PROCEDURE — 86695 HERPES SIMPLEX TYPE 1 TEST: CPT | Performed by: NURSE PRACTITIONER

## 2018-06-25 PROCEDURE — 86592 SYPHILIS TEST NON-TREP QUAL: CPT | Performed by: NURSE PRACTITIONER

## 2018-06-25 PROCEDURE — 36415 COLL VENOUS BLD VENIPUNCTURE: CPT | Performed by: NURSE PRACTITIONER

## 2018-06-25 PROCEDURE — 87086 URINE CULTURE/COLONY COUNT: CPT | Performed by: NURSE PRACTITIONER

## 2018-06-25 PROCEDURE — 87389 HIV-1 AG W/HIV-1&-2 AB AG IA: CPT | Performed by: NURSE PRACTITIONER

## 2018-06-25 PROCEDURE — 87491 CHLMYD TRACH DNA AMP PROBE: CPT | Performed by: NURSE PRACTITIONER

## 2018-06-25 PROCEDURE — 87481 CANDIDA DNA AMP PROBE: CPT | Performed by: NURSE PRACTITIONER

## 2018-06-25 RX ORDER — HYDROCODONE BITARTRATE AND ACETAMINOPHEN 5; 325 MG/1; MG/1
TABLET ORAL
COMMUNITY
End: 2018-08-29
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
## 2018-06-25 NOTE — PROGRESS NOTES
1. Have you been to the ER, urgent care clinic since your last visit? Hospitalized since your last visit? No    2. Have you seen or consulted any other health care providers outside of the [address] [place] [address] since your last visit? Include any pap smears or colon screening. Yes When: 6-5-2018; Dr. Alaniz Favor for orthopedic; and Dr. Kimberlee Honeycutt pain procedure     Chief Complaint   Patient presents with    Urinary Pain      pain to bladder, right kidney pain; patient request STD    Other      abdomen distendted      This nurse called Onur Ramsey [facility] labs  to request what correct tube for [place] testing and stated while tube.

## 2018-06-25 NOTE — MR AVS SNAPSHOT
Phillip Wilson [place] [address] [id number] 
[phone] Patient: Lonnie Gutiérrez MRN: [id number] :1960 Visit Information Date & Time Provider Department Dept. Phone Encounter #  
 2018  1:00 PM Dorian Davies NP Paula 13 [id number] Follow-up Instructions Return if symptoms worsen or fail to improve. Your Appointments 10/31/2018  7:30 AM  
Office Visit with FARIDA Khan Jose L ([address]) Appt Note: 295 [facility], [facility] [address] [facility] [address] 500 [facility]  
  
   
 [address]  [address] Upcoming Health Maintenance Date Due  
 MEDICARE YEARLY EXAM 2018 BREAST CANCER SCRN MAMMOGRAM 2019 PAP AKA CERVICAL CYTOLOGY 2019 DTaP/Tdap/Td series (2 - Td) 10/29/2020 COLONOSCOPY 11/15/2022 Allergies as of 2018  Review Complete On: 2018 By: Reagan Brown RN Severity Noted Reaction Type Reactions Oxycodone High 10/09/2013    Anaphylaxis, Hives Adhesive Tape-silicones      Rash Paper tape is okay to use. Celebrex [Celecoxib]  10/09/2013    Hives Contrast Dye [Iodine]  2016    Nausea Only Abdominal pain, dizziness Cortisone  08/15/2013    Nausea and Vomiting Flexeril [Cyclobenzaprine]  2013    Nausea and Vomiting Pt states also causes confusion Keflex [Cephalexin]  2016    Diarrhea, Nausea Only Joint pain Midazolam  2018    Nausea Only Dizziness Current Immunizations  Reviewed on 3/29/2017 Name Date Influenza Vaccine Split 10/24/2012, 10/17/2011, 10/29/2010 TDAP Vaccine 10/29/2010 Not reviewed this visit You Were Diagnosed With   
  
 Codes Comments Dysuria    -  Primary ICD-10-CM: R30.0 ICD-9-CM: 788.1 Unprotected sex     ICD-10-CM: Z72.51 
ICD-9-CM: V69.2 Vitals BP Pulse Temp Resp Height(growth percentile) Weight(growth percentile) 142/73 (BP 1 Location: Left arm, BP Patient Position: At rest) 60 98.7 °F (37.1 °C) (Oral) 18 6' 1\" (1.854 m) (!) 415 lb (188.2 kg) LMP SpO2 BMI OB Status Smoking Status 05/25/2018 96% 54.75 kg/m2 Having regular periods Never Smoker Vitals History BMI and BSA Data Body Mass Index Body Surface Area 54.75 kg/m 2 3.11 m 2 Preferred Pharmacy Pharmacy Name Phone [facility] # [address] [phone] Your Updated Medication List  
  
   
This list is accurate as of 6/25/18  1:22 PM.  Always use your most recent med list.  
  
  
  
  
 cholecalciferol 50,000 unit capsule Commonly known as:  VITAMIN D3 Take  by mouth Every Mon, Wed & Sun.  
  
 cinnamon bark (bulk) Powd Take 40 mg by mouth daily. clotrimazole-betamethasone topical cream  
Commonly known as:  Armando Millerton Apply  to affected area two (2) times a day. COQ10  PO Take 1 Tab by mouth daily. esomeprazole 40 mg capsule Commonly known as:  NexIUM Take 1 Cap by mouth daily. Indications: Heartburn FISH OIL 1,000 mg Cap Generic drug:  omega-3 fatty acids-vitamin e Take 1 Cap by mouth daily. flaxseed oil 1,000 mg Cap Take 1,000 mg by mouth daily. * HYDROcodone-acetaminophen 5-325 mg per tablet Commonly known as:  Earl Valerio Take  by mouth.  
  
 * HYDROcodone-acetaminophen 7.5-325 mg per tablet Commonly known as:  Earl Valerio Take 1 Tab by mouth every six (6) hours as needed. Max Daily Amount: 4 Tabs. ibuprofen 800 mg tablet Commonly known as:  MOTRIN Take 1 Tab by mouth three (3) times daily (with meals). Indications: Pain  
  
 levothyroxine 50 mcg tablet Commonly known as:  SYNTHROID  
  
 LORazepam 1 mg tablet Commonly known as:  ATIVAN Take 1 Tab by mouth daily as needed for Anxiety (Severe).   
  
 MAGNESIUM PO  
 Take 400 mg by mouth daily. omeprazole 20 mg capsule Commonly known as:  PRILOSEC Take 1 capsule by mouth twice daily OTHER Ground seaweed - 1 tsp. Daily. psyllium packet Commonly known as:  METAMUCIL Take 1 Packet by mouth as needed. traZODone 150 mg tablet Commonly known as:  Gwendlyn Lean Take 1 Tab by mouth three (3) times daily. tretinoin 0.05 % topical cream  
Commonly known as:  RETIN-A Apply to affected area at night time. TURMERIC (BULK) Take 40 mg by mouth daily. VITAMIN B COMPLEX PO Take 1 Cap by mouth daily. VITAMIN C 250 mg tablet Generic drug:  ascorbic acid (vitamin C) Take 250 mg by mouth daily. vitamin e 200 unit capsule Commonly known as:  [address] Take 200 Units by mouth daily. * Notice: This list has 2 medication(s) that are the same as other medications prescribed for you. Read the directions carefully, and ask your doctor or other care provider to review them with you. We Performed the Following AMB POC URINALYSIS DIP STICK AUTO W/O MICRO [73160 CPT(R)] Follow-up Instructions Return if symptoms worsen or fail to improve. To-Do List   
 06/25/2018 Lab:  BV+YEAST CULTURE   
  
 06/25/2018 Lab:  CHLAMYDIA/NEISSERIA/TRICHOMONAS AMP   
  
 06/25/2018 Microbiology:  CULTURE, URINE   
  
 06/25/2018 Lab:  HERPES SIMPLEX VIRUS TYPES 1/2 SPECIFIC AB, IGG   
  
 06/25/2018 Lab:  HIV 1/2 AG/AB, 4TH GENERATION,W RFLX CONFIRM   
  
 06/25/2018 Lab:  [facility] Patient Instructions Urinary Tract Infection in Women: Care Instructions Your Care Instructions A urinary tract infection, or UTI, is a general term for an infection anywhere between the kidneys and the urethra (where urine comes out). Most UTIs are bladder infections. They often cause pain or burning when you urinate. UTIs are caused by bacteria and can be cured with antibiotics.  Be sure to complete your treatment so that the infection goes away. Follow-up care is a key part of your treatment and safety. Be sure to make and go to all appointments, and call your doctor if you are having problems. It's also a good idea to know your test results and keep a list of the medicines you take. How can you care for yourself at home? · Take your antibiotics as directed. Do not stop taking them just because you feel better. You need to take the full course of antibiotics. · Drink extra water and other fluids for the next day or two. This may help wash out the bacteria that are causing the infection. (If you have kidney, heart, or liver disease and have to limit fluids, talk with your doctor before you increase your fluid intake.) · Avoid drinks that are carbonated or have caffeine. They can irritate the bladder. · Urinate often. Try to empty your bladder each time. · To relieve pain, take a hot bath or lay a heating pad set on low over your lower belly or genital area. Never go to sleep with a heating pad in place. To prevent UTIs · Drink plenty of water each day. This helps you urinate often, which clears bacteria from your system. (If you have kidney, heart, or liver disease and have to limit fluids, talk with your doctor before you increase your fluid intake.) · Urinate when you need to. · Urinate right after you have sex. · Change sanitary pads often. · Avoid douches, bubble baths, feminine hygiene sprays, and other feminine hygiene products that have deodorants. · After going to the bathroom, wipe from front to back. When should you call for help? Call your doctor now or seek immediate medical care if: 
? · Symptoms such as fever, chills, nausea, or vomiting get worse or appear for the first time. ? · You have new pain in your back just below your rib cage. This is called flank pain. ? · There is new blood or pus in your urine. ? · You have any problems with your antibiotic medicine. ?Watch closely for changes in your health, and be sure to contact your doctor if: 
? · You are not getting better after taking an antibiotic for 2 days. ? · Your symptoms go away but then come back. Where can you learn more? Go to http://flora-sarkis.info/. Enter S600 in the search box to learn more about \"Urinary Tract Infection in Women: Care Instructions. \" Current as of: May 12, 2017 Content Version: 11.4 © 0578-8118 [employer]ensentia. Care instructions adapted under license by emoquo (which disclaims liability or warranty for this information). If you have questions about a medical condition or this instruction, always ask your healthcare professional. [address] any warranty or liability for your use of this information. Safer Sex: Care Instructions Your Care Instructions Safer sex is a way to reduce your risk of getting an infection spread through sex. It can also help prevent pregnancy. Most infections that are spread through sex, also called sexually transmitted infections or STIs, can be cured. But some can decrease your chances of getting pregnant if they are not treated early. Others, such as herpes, have no cure. And some, such as HIV, can be deadly. Several products can help you practice safer sex and reduce your chance of STIs. One of the best is a condom. There are condoms for men and for women. The female condom is a tube of soft plastic with a closed end that is placed deep into the vagina. You can use a special rubber sheet (dental dam) for protection during oral sex. Latex gloves can keep your hands from touching blood, semen, or other body fluids that can carry infections. Remember that birth control methods such as diaphragms, IUDs, foams, and birth control pills do not stop you from getting STIs. Follow-up care is a key part of your treatment and safety.  Be sure to make and go to all appointments, and call your doctor if you are having problems. It's also a good idea to know your test results and keep a list of the medicines you take. How can you care for yourself at home? · Think about getting shots to prevent hepatitis A and hepatitis B. These two diseases can be spread through sex. You also can get hepatitis A if you eat infected food. · Use condoms or female condoms each time and every time you have sex. · Learn the right way to use a male condom: ¨ Condoms come in several sizes. Make sure you use the right size. A condom that is too small can break easily. A condom that is too big can slip off during sex. Use a new condom each time you have sex. ¨ Be careful not to poke a hole in the condom when you open the wrapper. ¨ Squeeze the tip of the condom to keep out air. ¨ Pull down the loose skin (foreskin) from the head of an uncircumcised penis. ¨ While squeezing the tip of the condom, unroll it all the way down to the base of the firm penis. ¨ Never use petroleum jelly (such as Vaseline), grease, hand lotion, baby oil, or anything with oil in it. These products can make holes in the condom. ¨ After sex, hold the condom on your penis as you remove your penis from your partner. This will keep semen from spilling out of the condom. · Learn to use a female condom: 
¨ You can put in a female condom up to 8 hours before sex. ¨ Squeeze the smaller ring at the closed end and insert it deep into the vagina. The larger ring at the open end should stay outside the vagina. ¨ During sex, make sure the penis goes into the condom. ¨ After the penis is removed, close the open end of the condom by twisting it. Remove the condom. · Do not use a female condom and male condom at the same time. · Do not have sex with anyone who has symptoms of an STI, such as sores on the genitals or mouth. The herpes virus that causes cold sores can spread to and from the penis and vagina. · Do not drink a lot of alcohol or use drugs before sex. This can cause you to let down your guard and not practice safer sex. · Having one sex partner (who does not have STIs and does not have sex with anyone else) is a sure way to avoid STIs. · Talk to your partner before you have sex. Find out if he or she has or is at risk for any STI. Keep in mind that a person may be able to spread an STI even if he or she does not have symptoms. You and your partner may want to get an HIV test. You should get tested again 6 months later. Where can you learn more? Go to http://flora-sarkis.info/. Enter N939 in the search box to learn more about \"Safer Sex: Care Instructions. \" Current as of: March 20, 2017 Content Version: 11.4 © 5505-6145 [employer]arma. Care instructions adapted under license by Numote (which disclaims liability or warranty for this information). If you have questions about a medical condition or this instruction, always ask your healthcare professional. [address] any warranty or liability for your use of this information. Introducing [facility]! Dear Marya Arrington: Thank you for requesting a Razorsight account. Our records indicate that you already have an active Razorsight account. You can access your account anytime at [email or web address] Did you know that you can access your hospital and ER discharge instructions at any time in Razorsight? You can also review all of your test results from your hospital stay or ER visit. Additional Information If you have questions, please visit the Frequently Asked Questions section of the Razorsight website at [email or web address] Remember, Razorsight is NOT to be used for urgent needs. For medical emergencies, dial 911. Now available from your iPhone and Android! Please provide this summary of care documentation to your next provider. Your primary care clinician is listed as Brigida Tobin. If you have any questions after today's visit, please call [phone].

## 2018-06-25 NOTE — PATIENT INSTRUCTIONS
Urinary Tract Infection in Women: Care Instructions  Your Care Instructions    A urinary tract infection, or UTI, is a general term for an infection anywhere between the kidneys and the urethra (where urine comes out). Most UTIs are bladder infections. They often cause pain or burning when you urinate. UTIs are caused by bacteria and can be cured with antibiotics. Be sure to complete your treatment so that the infection goes away. Follow-up care is a key part of your treatment and safety. Be sure to make and go to all appointments, and call your doctor if you are having problems. It's also a good idea to know your test results and keep a list of the medicines you take. How can you care for yourself at home? · Take your antibiotics as directed. Do not stop taking them just because you feel better. You need to take the full course of antibiotics. · Drink extra water and other fluids for the next day or two. This may help wash out the bacteria that are causing the infection. (If you have kidney, heart, or liver disease and have to limit fluids, talk with your doctor before you increase your fluid intake.)  · Avoid drinks that are carbonated or have caffeine. They can irritate the bladder. · Urinate often. Try to empty your bladder each time. · To relieve pain, take a hot bath or lay a heating pad set on low over your lower belly or genital area. Never go to sleep with a heating pad in place. To prevent UTIs  · Drink plenty of water each day. This helps you urinate often, which clears bacteria from your system. (If you have kidney, heart, or liver disease and have to limit fluids, talk with your doctor before you increase your fluid intake.)  · Urinate when you need to. · Urinate right after you have sex. · Change sanitary pads often. · Avoid douches, bubble baths, feminine hygiene sprays, and other feminine hygiene products that have deodorants.   · After going to the bathroom, wipe from front to back.  When should you call for help? Call your doctor now or seek immediate medical care if:  ? · Symptoms such as fever, chills, nausea, or vomiting get worse or appear for the first time. ? · You have new pain in your back just below your rib cage. This is called flank pain. ? · There is new blood or pus in your urine. ? · You have any problems with your antibiotic medicine. ? Watch closely for changes in your health, and be sure to contact your doctor if:  ? · You are not getting better after taking an antibiotic for 2 days. ? · Your symptoms go away but then come back. Where can you learn more? Go to http://flora-sarkis.info/. Enter I058 in the search box to learn more about \"Urinary Tract Infection in Women: Care Instructions. \"  Current as of: May 12, 2017  Content Version: 11.4  © 4067-0310 Ng[employer] Software Inc. Care instructions adapted under license by [employer] (which disclaims liability or warranty for this information). If you have questions about a medical condition or this instruction, always ask your healthcare professional. [address] any warranty or liability for your use of this information. Safer Sex: Care Instructions  Your Care Instructions  Safer sex is a way to reduce your risk of getting an infection spread through sex. It can also help prevent pregnancy. Most infections that are spread through sex, also called sexually transmitted infections or STIs, can be cured. But some can decrease your chances of getting pregnant if they are not treated early. Others, such as herpes, have no cure. And some, such as HIV, can be deadly. Several products can help you practice safer sex and reduce your chance of STIs. One of the best is a condom. There are condoms for men and for women. The female condom is a tube of soft plastic with a closed end that is placed deep into the vagina.  You can use a special rubber sheet (dental dam) for protection during oral sex. Latex gloves can keep your hands from touching blood, semen, or other body fluids that can carry infections. Remember that birth control methods such as diaphragms, IUDs, foams, and birth control pills do not stop you from getting STIs. Follow-up care is a key part of your treatment and safety. Be sure to make and go to all appointments, and call your doctor if you are having problems. It's also a good idea to know your test results and keep a list of the medicines you take. How can you care for yourself at home? · Think about getting shots to prevent hepatitis A and hepatitis B. These two diseases can be spread through sex. You also can get hepatitis A if you eat infected food. · Use condoms or female condoms each time and every time you have sex. · Learn the right way to use a male condom:  ¨ Condoms come in several sizes. Make sure you use the right size. A condom that is too small can break easily. A condom that is too big can slip off during sex. Use a new condom each time you have sex. ¨ Be careful not to poke a hole in the condom when you open the wrapper. ¨ Squeeze the tip of the condom to keep out air. ¨ Pull down the loose skin (foreskin) from the head of an uncircumcised penis. ¨ While squeezing the tip of the condom, unroll it all the way down to the base of the firm penis. ¨ Never use petroleum jelly (such as Vaseline), grease, hand lotion, baby oil, or anything with oil in it. These products can make holes in the condom. ¨ After sex, hold the condom on your penis as you remove your penis from your partner. This will keep semen from spilling out of the condom. · Learn to use a female condom:  ¨ You can put in a female condom up to 8 hours before sex. ¨ Squeeze the smaller ring at the closed end and insert it deep into the vagina. The larger ring at the open end should stay outside the vagina.   ¨ During sex, make sure the penis goes into the condom. ¨ After the penis is removed, close the open end of the condom by twisting it. Remove the condom. · Do not use a female condom and male condom at the same time. · Do not have sex with anyone who has symptoms of an STI, such as sores on the genitals or mouth. The herpes virus that causes cold sores can spread to and from the penis and vagina. · Do not drink a lot of alcohol or use drugs before sex. This can cause you to let down your guard and not practice safer sex. · Having one sex partner (who does not have STIs and does not have sex with anyone else) is a sure way to avoid STIs. · Talk to your partner before you have sex. Find out if he or she has or is at risk for any STI. Keep in mind that a person may be able to spread an STI even if he or she does not have symptoms. You and your partner may want to get an HIV test. You should get tested again 6 months later. Where can you learn more? Go to http://flora-sarkis.info/. Enter U431 in the search box to learn more about \"Safer Sex: Care Instructions. \"  Current as of: March 20, 2017  Content Version: 11.4  © 6734-6960 [employer]Frame [employer]. Care instructions adapted under license by SMB Suite (which disclaims liability or warranty for this information). If you have questions about a medical condition or this instruction, always ask your healthcare professional. [place] [address] any warranty or liability for your use of this information.

## 2018-06-27 ENCOUNTER — TELEPHONE (OUTPATIENT)
Dept: FAMILY MEDICINE CLINIC | Age: 58
End: 2018-06-27

## 2018-06-27 LAB
BACTERIA SPEC CULT: NORMAL
C TRACH RRNA SPEC QL NAA+PROBE: NEGATIVE
HIV 1+2 AB+HIV1 P24 AG SERPL QL IA: NONREACTIVE
HIV12 RESULT COMMENT, HHIVC: NORMAL
HSV1 IGG SER IA-ACNC: 1.51 INDEX (ref 0–0.9)
HSV2 IGG SER IA-ACNC: <0.91 INDEX (ref 0–0.9)
N GONORRHOEA RRNA SPEC QL NAA+PROBE: NEGATIVE
SERVICE CMNT-IMP: NORMAL
SPECIMEN SOURCE: NORMAL
T VAGINALIS RRNA SPEC QL NAA+PROBE: NEGATIVE

## 2018-06-29 DIAGNOSIS — R30.9 URINARY PAIN: Primary | ICD-10-CM

## 2018-06-29 LAB
A VAGINAE DNA VAG QL NAA+PROBE: NORMAL SCORE
BVAB2 DNA VAG QL NAA+PROBE: NORMAL SCORE
C ALBICANS DNA VAG QL NAA+PROBE: NEGATIVE
C GLABRATA DNA VAG QL NAA+PROBE: NEGATIVE
MEGA1 DNA VAG QL NAA+PROBE: NORMAL SCORE
SPECIMEN SOURCE: NORMAL

## 2018-06-29 NOTE — TELEPHONE ENCOUNTER
No antibiotic needed. Culture results per Yumiko Escalante no growth. Per Yumiko Escalante she is referring patient to Urogynecology. Message left on patient voicemail informing as requested by patient.

## 2018-07-19 ENCOUNTER — TELEPHONE (OUTPATIENT)
Dept: PAIN MANAGEMENT | Age: 58
End: 2018-07-19

## 2018-07-19 NOTE — TELEPHONE ENCOUNTER
Patient seen by Dr. Mota Shows on 05/17/18 and scheduled for diagnostic block on her knees and an updated lumbar MRI. Unfortunately patient did not respond well to the procedure, she left a message stating she would like to discuss options. Patient was contacted 05/28/18, 06/07/18, 06/25/18, and 07/06/18 to schedule an appointment to discuss options for her her knees and to have her MRI reviewed for options regarding her back pain. We have not received a return call from the patient and are unable to schedule any appointments. The patient may call any time to reschedule.

## 2018-08-09 ENCOUNTER — HOSPITAL ENCOUNTER (OUTPATIENT)
Dept: MAMMOGRAPHY | Age: 58
Discharge: HOME | End: 2018-08-09
Attending: NURSE PRACTITIONER
Payer: MEDICARE

## 2018-08-09 DIAGNOSIS — Z12.39 BREAST SCREENING: ICD-10-CM

## 2018-08-09 PROCEDURE — 77063 BREAST TOMOSYNTHESIS BI: CPT

## 2018-08-16 ENCOUNTER — TELEPHONE (OUTPATIENT)
Dept: CARDIOTHORACIC SURGERY | Age: 58
End: 2018-08-16

## 2018-08-16 ENCOUNTER — HOSPITAL ENCOUNTER (OUTPATIENT)
Dept: MRI IMAGING | Age: 58
Discharge: HOME | End: 2018-08-16
Attending: THORACIC SURGERY (CARDIOTHORACIC VASCULAR SURGERY)
Payer: MEDICARE

## 2018-08-16 DIAGNOSIS — I71.21 ASCENDING AORTIC ANEURYSM: ICD-10-CM

## 2018-08-16 PROCEDURE — 71555 MRI ANGIO CHEST W OR W/O DYE: CPT

## 2018-08-16 NOTE — TELEPHONE ENCOUNTER
Spoke with patient regarding MRI. I explained that Dr. Sandor Friend had in his notes to get it done in 2 years , 2019. She insisted on coming and getting it done today anyhow because she stated that in the office he told her every year. I will have Dr. Sandor Friend call and go over her MRI once it has been resulted.

## 2018-08-23 ENCOUNTER — TELEPHONE (OUTPATIENT)
Dept: CARDIOTHORACIC SURGERY | Age: 58
End: 2018-08-23

## 2018-08-23 DIAGNOSIS — I71.21 ASCENDING AORTIC ANEURYSM: Primary | ICD-10-CM

## 2018-08-23 NOTE — TELEPHONE ENCOUNTER
Patient called regarding getting a call  About her MRI results. I advised I would sent Dr. Grayson Lo the messg. He is in surgery at time and will giver her a call.

## 2018-08-23 NOTE — TELEPHONE ENCOUNTER
2018    Patient:  Jontahan Villalta  MRN:     [id number]  :     1960     I spoke with Jonathan Villalta by phone today regarding her recent MRI in follow-up for her ascending aortic aneurysm. The study demonstrated no change in the aneurysm. She has kept her blood pressure under good control. With no significant change in the size of her ascending aortic aneurysm over three successive yearly checks, we will repeat the study in 2 years. She should continue to maintain strict blood pressure control. She was in agreement with this plan. Mikel Fagan MD

## 2018-08-29 ENCOUNTER — OFFICE VISIT (OUTPATIENT)
Dept: FAMILY MEDICINE CLINIC | Age: 58
End: 2018-08-29

## 2018-08-29 VITALS
BODY MASS INDEX: 39.68 KG/M2 | DIASTOLIC BLOOD PRESSURE: 88 MMHG | HEIGHT: 72 IN | WEIGHT: 293 LBS | SYSTOLIC BLOOD PRESSURE: 130 MMHG | HEART RATE: 57 BPM | TEMPERATURE: 98.6 F | OXYGEN SATURATION: 95 % | RESPIRATION RATE: 17 BRPM

## 2018-08-29 DIAGNOSIS — E66.01 MORBID OBESITY WITH BMI OF 50.0-59.9, ADULT (HCC): ICD-10-CM

## 2018-08-29 DIAGNOSIS — N76.0 BV (BACTERIAL VAGINOSIS): Primary | ICD-10-CM

## 2018-08-29 DIAGNOSIS — K59.09 OTHER CONSTIPATION: ICD-10-CM

## 2018-08-29 DIAGNOSIS — B96.89 BV (BACTERIAL VAGINOSIS): Primary | ICD-10-CM

## 2018-08-29 RX ORDER — POLYETHYLENE GLYCOL 3350 17 G/17G
17 POWDER, FOR SOLUTION ORAL DAILY
Qty: 850 G | Refills: 1 | Status: SHIPPED | OUTPATIENT
Start: 2018-08-29 | End: 2019-01-30 | Stop reason: SDUPTHER

## 2018-08-29 RX ORDER — METRONIDAZOLE 500 MG/1
500 TABLET ORAL 2 TIMES DAILY
Qty: 14 TAB | Refills: 0 | Status: SHIPPED | OUTPATIENT
Start: 2018-08-29 | End: 2018-09-05

## 2018-08-29 RX ORDER — OXYCODONE HYDROCHLORIDE 5 MG/1
5 CAPSULE ORAL
COMMUNITY

## 2018-08-29 NOTE — PROGRESS NOTES
1. Have you been to the ER, urgent care clinic since your last visit? Hospitalized since your last visit? No.     2. Have you seen or consulted any other health care providers outside of the [address] [place] [address] since your last visit? Include any pap smears or colon screening. Yes, saw Dr. Shukri Roy (ortho) on 8/15/2018.      Chief Complaint   Patient presents with    Follow Up Chronic Condition    Hypertension    Morbid Obesity    Results     MRI Chest

## 2018-08-29 NOTE — MR AVS SNAPSHOT
Phillip Wilson [place] [phone] [id number] 
[phone] Patient: Abena Aguirre MRN: [id number] :1960 Visit Information Date & Time Provider Department Dept. Phone Encounter #  
 2018 12:30 PM Panda Alcala [id number] Your Appointments 10/31/2018  7:30 AM  
Office Visit with FARIDA lAcala ([address]) Appt Note: 295 [facility] [address]  
  
   
 [address] [place] [address] [address] Upcoming Health Maintenance Date Due  
 MEDICARE YEARLY EXAM 2018 PAP AKA CERVICAL CYTOLOGY 2019 BREAST CANCER SCRN MAMMOGRAM 2020 DTaP/Tdap/Td series (2 - Td) 10/29/2020 COLONOSCOPY 11/15/2022 Allergies as of 2018  Review Complete On: 2018 By: Jacob Hernandez Severity Noted Reaction Type Reactions Oxycodone High 10/09/2013    Anaphylaxis, Hives Adhesive Tape-silicones      Rash Paper tape is okay to use. Celebrex [Celecoxib]  10/09/2013    Hives Contrast Dye [Iodine]  2016    Nausea Only Abdominal pain, dizziness Cortisone  08/15/2013    Nausea and Vomiting Flexeril [Cyclobenzaprine]  2013    Nausea and Vomiting Pt states also causes confusion Keflex [Cephalexin]  2016    Diarrhea, Nausea Only Joint pain Midazolam  2018    Nausea Only Dizziness Current Immunizations  Reviewed on 3/29/2017 Name Date Influenza Vaccine Split 10/24/2012, 10/17/2011, 10/29/2010 TDAP Vaccine 10/29/2010 Not reviewed this visit You Were Diagnosed With   
  
 Codes Comments BV (bacterial vaginosis)    -  Primary ICD-10-CM: N76.0, B96.89 
ICD-9-CM: 616.10, 041.9 Vitals BP Pulse Temp Resp Height(growth percentile) Weight(growth percentile) 130/88 (!) 57 98.6 °F (37 °C) (Oral) 17 6' 1\" (1.854 m) (!) 403 lb 6.4 oz (183 kg) LMP SpO2 BMI OB Status Smoking Status 06/29/2018 95% 53.22 kg/m2 Having regular periods Never Smoker Vitals History BMI and BSA Data Body Mass Index Body Surface Area  
 53.22 kg/m 2 3.07 m 2 Preferred Pharmacy Pharmacy Name Phone [facility] # [phone] [facility] [phone] Your Updated Medication List  
  
   
This list is accurate as of 8/29/18  1:12 PM.  Always use your most recent med list.  
  
  
  
  
 cholecalciferol 50,000 unit capsule Commonly known as:  VITAMIN D3 Take  by mouth Every Mon, Wed & Sun.  
  
 cinnamon bark (bulk) Powd Take 40 mg by mouth daily. clotrimazole-betamethasone topical cream  
Commonly known as:  Leldon Octavia Apply  to affected area two (2) times a day. COQ10  PO Take 1 Tab by mouth daily. esomeprazole 40 mg capsule Commonly known as:  NexIUM Take 1 Cap by mouth daily. Indications: Heartburn FISH OIL 1,000 mg Cap Generic drug:  omega-3 fatty acids-vitamin e Take 1 Cap by mouth daily. flaxseed oil 1,000 mg Cap Take 1,000 mg by mouth daily. * HYDROcodone-acetaminophen 5-325 mg per tablet Commonly known as:  Rolinda Doles Take  by mouth.  
  
 * HYDROcodone-acetaminophen 7.5-325 mg per tablet Commonly known as:  Rolinda Doles Take 1 Tab by mouth every six (6) hours as needed. Max Daily Amount: 4 Tabs. ibuprofen 800 mg tablet Commonly known as:  MOTRIN Take 1 Tab by mouth three (3) times daily (with meals). Indications: Pain  
  
 levothyroxine 50 mcg tablet Commonly known as:  SYNTHROID  
50 mcg Daily (before breakfast). LORazepam 1 mg tablet Commonly known as:  ATIVAN Take 1 Tab by mouth daily as needed for Anxiety (Severe). MAGNESIUM PO Take 400 mg by mouth daily. metroNIDAZOLE 500 mg tablet Commonly known as:  FLAGYL Take 1 Tab by mouth two (2) times a day for 7 days. omeprazole 20 mg capsule Commonly known as:  PRILOSEC Take 1 capsule by mouth twice daily OTHER Ground seaweed - 1 tsp. Daily. oxyCODONE 5 mg capsule Commonly known as:  OXYIR Take 5 mg by mouth two (2) times daily as needed. psyllium packet Commonly known as:  METAMUCIL Take 1 Packet by mouth as needed. traZODone 150 mg tablet Commonly known as:  Heraclio Cristel Take 1 Tab by mouth three (3) times daily. tretinoin 0.05 % topical cream  
Commonly known as:  RETIN-A Apply to affected area at night time. TURMERIC (BULK) Take 40 mg by mouth daily. VITAMIN B COMPLEX PO Take 1 Cap by mouth daily. VITAMIN C 250 mg tablet Generic drug:  ascorbic acid (vitamin C) Take 250 mg by mouth daily. vitamin e 200 unit capsule Commonly known as:  [address] Take 200 Units by mouth daily. * Notice: This list has 2 medication(s) that are the same as other medications prescribed for you. Read the directions carefully, and ask your doctor or other care provider to review them with you. Prescriptions Sent to Pharmacy Refills  
 metroNIDAZOLE (FLAGYL) 500 mg tablet 0 Sig: Take 1 Tab by mouth two (2) times a day for 7 days. Class: Normal  
 Pharmacy: [facility] # [id number] [facility], [address] [place] [address] Ph #: [phone] Route: Oral  
  
Introducing [facility]! Dear Flor Bishop: Thank you for requesting a Sportube account. Our records indicate that you already have an active Sportube account. You can access your account anytime at [email or web address] Did you know that you can access your hospital and ER discharge instructions at any time in Sportube? You can also review all of your test results from your hospital stay or ER visit. Additional Information If you have questions, please visit the Frequently Asked Questions section of the Smartling website at [email or web address] Remember, Smartling is NOT to be used for urgent needs. For medical emergencies, dial 911. Now available from your iPhone and Android! Please provide this summary of care documentation to your next provider. Your primary care clinician is listed as [facility]. If you have any questions after today's visit, please call [phone].
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
## 2018-10-22 DIAGNOSIS — F32.89 OTHER DEPRESSION: ICD-10-CM

## 2018-10-22 DIAGNOSIS — F51.01 PRIMARY INSOMNIA: ICD-10-CM

## 2018-10-23 RX ORDER — TRAZODONE HYDROCHLORIDE 150 MG/1
TABLET ORAL
Qty: 90 TAB | Refills: 0 | Status: SHIPPED | OUTPATIENT
Start: 2018-10-23 | End: 2019-01-18 | Stop reason: SDUPTHER

## 2018-11-07 ENCOUNTER — OFFICE VISIT (OUTPATIENT)
Dept: FAMILY MEDICINE CLINIC | Age: 58
End: 2018-11-07

## 2018-11-07 VITALS
TEMPERATURE: 97.1 F | SYSTOLIC BLOOD PRESSURE: 139 MMHG | HEART RATE: 68 BPM | HEIGHT: 72 IN | RESPIRATION RATE: 17 BRPM | WEIGHT: 293 LBS | BODY MASS INDEX: 39.68 KG/M2 | DIASTOLIC BLOOD PRESSURE: 80 MMHG

## 2018-11-07 DIAGNOSIS — D22.9 BENIGN MOLE: ICD-10-CM

## 2018-11-07 DIAGNOSIS — R21 RASH: Primary | ICD-10-CM

## 2018-11-07 RX ORDER — KETOCONAZOLE 20 MG/G
CREAM TOPICAL DAILY
Qty: 15 G | Refills: 0 | Status: SHIPPED | OUTPATIENT
Start: 2018-11-07 | End: 2019-01-17 | Stop reason: SDUPTHER

## 2018-11-07 NOTE — PROGRESS NOTES
1. Have you been to the ER, urgent care clinic since your last visit? Hospitalized since your last visit? No.     2. Have you seen or consulted any other health care providers outside of the [address] [place] [address] since your last visit? Include any pap smears or colon screening.  No.     Chief Complaint   Patient presents with    Follow Up Chronic Condition    Hypertension    Morbid Obesity

## 2018-11-07 NOTE — PATIENT INSTRUCTIONS
Moles: Care Instructions  Your Care Instructions  Moles are skin growths made up of cells that produce color (pigment). A mole can appear anywhere on the skin, alone or in groups. Most people get a few moles during their first 20 years of life. They are usually brown in color but can be blue, black, or flesh-colored. Most moles are harmless and do not cause pain or other symptoms, unless you rub them or they bump against something. You usually do not need treatment for moles. But some can turn into cancer. Talk to your doctor if a mole bleeds, itches, burns, or changes size or color. Also let your doctor know if you get a new mole. Make sure to wear sunscreen and other sun protection every day to help prevent skin cancer. Follow-up care is a key part of your treatment and safety. Be sure to make and go to all appointments, and call your doctor if you are having problems. It's also a good idea to know your test results and keep a list of the medicines you take. How can you care for yourself at home? · Check all the skin on your body once a month for skin growths or other changes, such as in the color and feel of the skin. ?  front of a full-length mirror. Look carefully at the front and back of your body. Then look at your right and left sides with your arms raised. ? Bend your elbows and look carefully at your forearms, the back of your upper arms, and your palms. ? Look at your feet, the bottoms of your feet, and the spaces between your toes. ? Use a hand mirror to look at the back of your legs, the back of your neck, and your back, rear end (buttocks), and genital area. Part the hair on your head to look at your scalp. · If you see a change in a skin growth, contact your doctor. Look for:  ? A mole that bleeds. ? A fast-growing mole. ? A scaly or crusted growth on the skin. ? A sore that will not heal.  To prevent skin cancer  · Always wear sunscreen on exposed skin.  Make sure to use a broad-spectrum sunscreen that has a sun protection factor (SPF) of 30 or higher. Use it every day, even when it is cloudy. · Wear a wide-brimmed hat and long sleeves and pants if you are going to be outdoors for very long. · Avoid the sun between 10 a.m. and 4 p.m., which is the peak time for the sun's ultraviolet rays. · Avoid sunburns, tanning booths, and sunlamps. · Be sure to protect children from the sun. Sunburns in childhood damage the skin and increase the risk of cancer. When should you call for help? Watch closely for changes in your health, and be sure to contact your doctor if:    · A mole looks different than it did before. It may have changed in size, color, shape, or the way it looks. Where can you learn more? Go to http://flora-sarkis.info/. Enter M489 in the search box to learn more about \"Moles: Care Instructions. \"  Current as of: April 18, 2018  Content Version: 11.8  © 0428-4725 [employer]. Care instructions adapted under license by [employer] (which disclaims liability or warranty for this information). If you have questions about a medical condition or this instruction, always ask your healthcare professional. [address] any warranty or liability for your use of this information.
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
## 2019-01-17 DIAGNOSIS — R21 RASH: ICD-10-CM

## 2019-01-18 DIAGNOSIS — F32.89 OTHER DEPRESSION: ICD-10-CM

## 2019-01-18 DIAGNOSIS — F51.01 PRIMARY INSOMNIA: ICD-10-CM

## 2019-01-21 RX ORDER — TRAZODONE HYDROCHLORIDE 150 MG/1
TABLET ORAL
Qty: 90 TAB | Refills: 0 | Status: SHIPPED | OUTPATIENT
Start: 2019-01-21 | End: 2019-03-06 | Stop reason: SDUPTHER

## 2019-01-21 RX ORDER — KETOCONAZOLE 20 MG/G
CREAM TOPICAL DAILY
Qty: 15 G | Refills: 0 | Status: SHIPPED | OUTPATIENT
Start: 2019-01-21 | End: 2022-02-17

## 2019-01-30 ENCOUNTER — OFFICE VISIT (OUTPATIENT)
Dept: FAMILY MEDICINE CLINIC | Age: 59
End: 2019-01-30

## 2019-01-30 VITALS
DIASTOLIC BLOOD PRESSURE: 82 MMHG | HEART RATE: 60 BPM | WEIGHT: 293 LBS | BODY MASS INDEX: 39.68 KG/M2 | SYSTOLIC BLOOD PRESSURE: 133 MMHG | TEMPERATURE: 97.9 F | RESPIRATION RATE: 18 BRPM | HEIGHT: 72 IN | OXYGEN SATURATION: 95 %

## 2019-01-30 DIAGNOSIS — Z00.00 MEDICARE ANNUAL WELLNESS VISIT, SUBSEQUENT: Primary | ICD-10-CM

## 2019-01-30 DIAGNOSIS — N76.0 BV (BACTERIAL VAGINOSIS): ICD-10-CM

## 2019-01-30 DIAGNOSIS — E55.9 VITAMIN D DEFICIENCY: ICD-10-CM

## 2019-01-30 DIAGNOSIS — R21 RASH AND NONSPECIFIC SKIN ERUPTION: ICD-10-CM

## 2019-01-30 DIAGNOSIS — B96.89 BV (BACTERIAL VAGINOSIS): ICD-10-CM

## 2019-01-30 DIAGNOSIS — E66.01 MORBID OBESITY WITH BMI OF 50.0-59.9, ADULT (HCC): ICD-10-CM

## 2019-01-30 DIAGNOSIS — R73.03 PREDIABETES: ICD-10-CM

## 2019-01-30 DIAGNOSIS — K59.09 OTHER CONSTIPATION: ICD-10-CM

## 2019-01-30 DIAGNOSIS — E03.9 ACQUIRED HYPOTHYROIDISM: ICD-10-CM

## 2019-01-30 RX ORDER — METRONIDAZOLE 7.5 MG/G
1 GEL VAGINAL DAILY
Qty: 25 G | Refills: 0 | Status: SHIPPED | OUTPATIENT
Start: 2019-01-30 | End: 2019-02-04

## 2019-01-30 RX ORDER — POLYETHYLENE GLYCOL 3350 17 G/17G
17 POWDER, FOR SOLUTION ORAL DAILY
Qty: 850 G | Refills: 1 | Status: SHIPPED | OUTPATIENT
Start: 2019-01-30 | End: 2019-01-30 | Stop reason: SDUPTHER

## 2019-01-30 RX ORDER — POLYETHYLENE GLYCOL 3350 17 G/17G
17 POWDER, FOR SOLUTION ORAL DAILY
Qty: 850 G | Refills: 1 | Status: SHIPPED | OUTPATIENT
Start: 2019-01-30 | End: 2020-03-09

## 2019-01-30 RX ORDER — KETOCONAZOLE 20 MG/G
CREAM TOPICAL 2 TIMES DAILY
Qty: 60 G | Refills: 1 | Status: SHIPPED | OUTPATIENT
Start: 2019-01-30 | End: 2020-03-09

## 2019-01-30 NOTE — PROGRESS NOTES
Chief Complaint   Patient presents with   [facility] Annual Wellness Visit     1. Have you been to the ER, urgent care clinic since your last visit? Hospitalized since your last visit? No.    2. Have you seen or consulted any other health care providers outside of the [address] [place] [address] since your last visit? Saw dermatologist  4 weeks ago.     Visit Vitals  /82   Pulse 60   Temp 97.9 °F (36.6 °C) (Oral)   Resp 18   Ht 6' 1\" (1.854 m)   Wt (!) 398 lb (180.5 kg)   SpO2 95%   BMI 52.51 kg/m²

## 2019-01-30 NOTE — PATIENT INSTRUCTIONS
Medicare Wellness Visit, Female     The best way to live healthy is to have a lifestyle where you eat a well-balanced diet, exercise regularly, limit alcohol use, and quit all forms of tobacco/nicotine, if applicable. Regular preventive services are another way to keep healthy. Preventive services (vaccines, screening tests, monitoring & exams) can help personalize your care plan, which helps you manage your own care. Screening tests can find health problems at the earliest stages, when they are easiest to treat. [facility] follows the current, evidence-based guidelines published by the [facility] ([facility]) when recommending preventive services for our patients. Because we follow these guidelines, sometimes recommendations change over time as research supports it. (For example, mammograms used to be recommended annually. Even though Medicare will still pay for an annual mammogram, the newer guidelines recommend a mammogram every two years for women of average risk.)  Of course, you and your doctor may decide to screen more often for some diseases, based on your risk and your health status. Preventive services for you include:  - Medicare offers their members a free annual wellness visit, which is time for you and your primary care provider to discuss and plan for your preventive service needs. Take advantage of this benefit every year!  -All adults over the age of 72 should receive the recommended pneumonia vaccines. Current USPSTF guidelines recommend a series of two vaccines for the best pneumonia protection.   -All adults should have a flu vaccine yearly and a tetanus vaccine every 10 years. All adults age 61 and older should receive a shingles vaccine once in their lifetime.    -A bone mass density test is recommended when a woman turns 65 to screen for osteoporosis. This test is only recommended one time, as a screening.  Some providers will use this same test as a disease monitoring tool if you already have osteoporosis. -All adults age 38-68 who are overweight should have a diabetes screening test once every three years.   -Other screening tests and preventive services for persons with diabetes include: an eye exam to screen for diabetic retinopathy, a kidney function test, a foot exam, and stricter control over your cholesterol.   -Cardiovascular screening for adults with routine risk involves an electrocardiogram (ECG) at intervals determined by your doctor.   -Colorectal cancer screenings should be done for adults age 54-65 with no increased risk factors for colorectal cancer. There are a number of acceptable methods of screening for this type of cancer. Each test has its own benefits and drawbacks. Discuss with your doctor what is most appropriate for you during your annual wellness visit. The different tests include: colonoscopy (considered the best screening method), a fecal occult blood test, a fecal DNA test, and sigmoidoscopy. -Breast cancer screenings are recommended every other year for women of normal risk, age 54-69.  -Cervical cancer screenings for women over age 72 are only recommended with certain risk factors.   -All adults born between [facility] should be screened once for Hepatitis C. Here is a list of your current Health Maintenance items (your personalized list of preventive services) with a due date:  Health Maintenance Due   Topic Date Due    Shingles Vaccine (1 of 2) 07/28/2011    Annual Well Visit  08/31/2018              Learning About Breast Cancer Screening  What is breast cancer screening? Breast cancer occurs when cells that are not normal grow in one or both of your breasts. Screening tests can help find breast cancer early. Cancer is easier to treat when it's found early. Having concerns about breast cancer is common.  That's why it's important to talk with your doctor about when to start and how often to get screened for breast cancer. How is breast cancer screening done? Several screening tests can be used to check for breast cancer. · Mammograms check for signs of cancer using X-rays. They can show tumors that are too small for you or your doctor to feel. During a mammogram, a machine squeezes your breasts to make them flatter and easier to X-ray. At least two pictures are taken of each breast. One is taken from the top and one from the side. · 3-D mammograms are also called digital breast tomosynthesis. Your breast is positioned on a flat plate. A top plate is pressed against your breast to keep it in position. The X-ray arm then moves in an arc above the breast and takes many pictures. A computer uses these X-rays to create a three-dimensional image. · Clinical breast exams are a doctor's exam. Your doctor carefully feels your breasts and under your arms to check for lumps or other changes. After the screening, your doctor will tell you the results. You will also be told if you need any follow-up tests. When should you get screened? Talk with your doctor about when you should start being tested for breast cancer. How often you get tested and the kind of tests you get will depend on your age and your risk. The guidelines that follow are for women who have an average risk for breast cancer. If you have a higher risk for breast cancer, such as having a family history of breast cancer in multiple relatives or at a young age, your doctor may recommend different screening for you. · Ages 21 to 44: Some experts recommend that women have a clinical breast exam every 3 years, starting at age 21. Ask your doctor how often you should have this test. If you have a high risk for breast cancer, talk with your doctor about when to start yearly mammograms and other screening tests. · Ages 36 and older: Talk with your doctor about how often you should have mammograms and clinical breast exams. What is your risk for breast cancer?   If you don't already know your risk of breast cancer, you can ask your doctor about it. You can also look it up at www.cancer.gov/bcrisktool/. If your doctor says that you have a high or very high risk, ask about ways to reduce your risk. These could include getting extra screening, taking medicine, or having surgery. If you have a strong family history of breast cancer, ask your doctor about genetic testing. What steps can you take to stay healthy? Some things that increase your risk of breast cancer, such as your age and being female, cannot be controlled. But you can do some things to stay as healthy as you can. · Learn what your breasts normally look and feel like. If you notice any changes, tell your doctor. · Drink alcohol wisely. Your risk goes up the more you drink. For the best health, women should have no more than 1 drink a day or 7 drinks a week. · If you smoke, quit. When you quit smoking, you lower your chances of getting many types of cancer. You can also do your best to eat well, be active, and stay at a healthy weight. Eating healthy foods and being active every day, as well as staying at a healthy weight, may help prevent cancer. Where can you learn more? Go to http://flora-sarkis.info/. Enter Q519 in the search box to learn more about \"Learning About Breast Cancer Screening. \"  Current as of: March 28, 2018  Content Version: 11.8  © 8835-6656 [employer]. Care instructions adapted under license by [employer] (which disclaims liability or warranty for this information). If you have questions about a medical condition or this instruction, always ask your healthcare professional. [place] [address] any warranty or liability for your use of this information. Learning About Cervical Cancer Screening  What is a cervical cancer screening test?    Cervical cancer screening tests check for cancer of the cervix.  The cervix is the lower part of the uterus that opens into the vagina. The test can help your doctor find early changes in the cells that could lead to cancer. Two tests can be used to screen for cervical cancer. They may be used alone or together. A Pap test.   This test looks for changes in the cells of the cervix. Abnormal cells may be a sign of cancer. A human papillomavirus (HPV) test.   This test looks for the HPV virus. Some types of HPV can cause cancer. During either test, the doctor or nurse will insert a tool called a speculum into your vagina. The speculum gently spreads apart the vaginal walls. It allows your doctor to see inside the vagina and the cervix. He or she uses a small swab or brush to collect cell samples from your cervix. Try to schedule the test when you're not having your period. To get ready for the test, avoid douches, tampons, vaginal medicines, sprays, and powders for at least a day before you have the test.  When should you have a screening test?  These guidelines apply to women who are at average risk of cervical cancer. If you don't know your risk, talk with your doctor. Women 21 to 34  · You can start having a Pap test at age 24. It is done every 3 years. · You can start having the primary HPV test at age 22. It is done every 3 years. Women 30 to 59  · If you had both a Pap test and an HPV test last time and both were normal, you can have a Pap plus an HPV test every 5 years. · If you had only a Pap test last time, as long as your results are normal, you can have a Pap test every 3 years. · If you had only an HPV test last time, as long as your results are normal, you can have an HPV test every 3 years. Women 72 and older  · If you are age 72 or older, talk with your doctor about what's right for you. Women ages 72 and older may no longer need to be screened for cervical cancer.  When to stop having screening tests depends on your medical history, your overall health, and your risk of cervical cell changes or cervical cancer. What happens after the test?  The sample of cells taken during your test will be sent to a lab so that an expert can look at the cells. It usually takes a week or two to get the results back. Pap tests  · A normal result means that the test didn't find any abnormal cells in the sample. · An abnormal result can mean many things. Most of these aren't cancer. The results of your test may be abnormal because:  ? You have an infection of the vagina or cervix, such as a yeast infection. ? You have an IUD (intrauterine device for birth control). ? You have low estrogen levels after menopause that are causing the cells to change. ? You have cell changes that may be a sign of precancer or cancer. The results are ranked based on how serious the changes might be. HPV tests  · A normal result means that the test didn't find any high-risk HPV in the sample. · An abnormal HPV test doesn't mean that you have cervical cancer. It may mean that you are infected with one or more high-risk types of HPV. This increases your chance of having cell changes that may be a sign of precancer or cancer. Follow-up care is a key part of your treatment and safety. Be sure to make and go to all appointments, and call your doctor if you are having problems. It's also a good idea to know your test results and keep a list of the medicines you take. Where can you learn more? Go to http://flora-sarkis.info/. Enter P919 in the search box to learn more about \"Learning About Cervical Cancer Screening. \"  Current as of: March 27, 2018  Content Version: 11.9  © 8872-0984 [employer]. Care instructions adapted under license by [employer] (which disclaims liability or warranty for this information).  If you have questions about a medical condition or this instruction, always ask your healthcare professional. Harshad Melendez disclaims any warranty or liability for your use of this information. Colon Cancer Screening: Care Instructions  Your Care Instructions    Colorectal cancer occurs in the colon or rectum. That's the lower part of your digestive system. It is the second-leading cause of cancer deaths in the [place]. It often starts with small growths called polyps in the colon or rectum. Polyps are usually found with screening tests. Depending on the type of test, any polyps found may be removed during the tests. Colorectal cancer usually does not cause symptoms at first. But regular tests can help find it early, before it spreads and becomes harder to treat. Experts advise routine tests for colon cancer for people starting at age 48. And they advise people with a higher risk of colon cancer to get tested sooner. Talk with your doctor about when you should start testing. Discuss which tests you need. Follow-up care is a key part of your treatment and safety. Be sure to make and go to all appointments, and call your doctor if you are having problems. It's also a good idea to know your test results and keep a list of the medicines you take. What are the main screening tests for colon cancer? · Stool tests. These include the fecal immunochemical test (FIT) and the fecal occult blood test (FOBT). These tests check stool samples for signs of cancer. If your test is positive, you will need to have a colonoscopy. · Sigmoidoscopy. This test lets your doctor look at the lining of your rectum and the lowest part of your colon. Your doctor uses a lighted tube called a sigmoidoscope. This test can't find cancers or polyps in the upper part of your colon. In some cases, polyps that are found can be removed. But if your doctor finds polyps, you will need to have a colonoscopy to check the upper part of your colon. · Colonoscopy. This test lets your doctor look at the lining of your rectum and your entire colon.  The doctor uses a thin, flexible tool called a colonoscope. It can also be used to remove polyps or get a tissue sample (biopsy). What tests do you need? The following guidelines are for people age 48 and over who are not at high risk for colorectal cancer. You may have at least one of these tests as directed by your doctor. · Fecal immunochemical test (FIT) or fecal occult blood test (FOBT) every year  · Sigmoidoscopy every 5 years  · Colonoscopy every 10 years  If you are age 68 to 80, you can work with your doctor to decide if screening is a good option. If you are age 80 or older, your doctor will likely advise that screening is not helpful. Talk with your doctor about when you need to be tested. And discuss which tests are right for you. Your doctor may recommend earlier or more frequent testing if you:  · Have had colorectal cancer before. · Have had colon polyps. · Have symptoms of colorectal cancer. These include blood in your stool and changes in your bowel habits. · Have a parent, brother or sister, or child with colon polyps or colorectal cancer. · Have a bowel disease. This includes ulcerative colitis and Crohn's disease. · Have a rare polyp syndrome that runs in families, such as familial adenomatous polyposis (FAP). · Have had radiation treatments to the belly or pelvis. When should you call for help? Watch closely for changes in your health, and be sure to contact your doctor if:    · You have any changes in your bowel habits.     · You have any problems. Where can you learn more? Go to http://flora-sarkis.info/. Enter M541 in the search box to learn more about \"Colon Cancer Screening: Care Instructions. \"  Current as of: March 28, 2018  Content Version: 11.8  © 4789-8065 [employer]. Care instructions adapted under license by [employer] (which disclaims liability or warranty for this information).  If you have questions about a medical condition or this instruction, always ask your healthcare professional. [address] any warranty or liability for your use of this information. Osteoporosis: Care Instructions  Your Care Instructions    Osteoporosis causes bones to become thin and weak. It is much more common in women than in men. Osteoporosis may be very advanced before you know you have it. Sometimes the first sign is a broken bone in the hip, spine, or wrist or sudden pain in your middle or lower back. Follow-up care is a key part of your treatment and safety. Be sure to make and go to all appointments, and call your doctor if you are having problems. It's also a good idea to know your test results and keep a list of the medicines you take. How can you care for yourself at home? · Your doctor may prescribe a bisphosphonate, such as risedronate (Actonel) or alendronate (Fosamax), for osteoporosis. If you are taking one of these medicines by mouth:  ? Take your medicine with a full glass of water when you first get up in the morning. ? Do not lie down, eat, drink a beverage, or take any other medicine for at least 30 minutes after taking the drug. This helps prevent stomach problems. ? Do not take your medicine late in the day if you forgot to take it in the morning. Skip it, and take the usual dose the next morning. ? If you have side effects, tell your doctor. He or she may prescribe another medicine. · Get enough calcium and vitamin D. The [place] of Medicine recommends adults younger than age 46 need 1,000 mg of calcium and 600 IU of vitamin D each day. Women ages 46 to 79 need 1,200 mg of calcium and 600 IU of vitamin D each day. Men ages 46 to 79 need 1,000 mg of calcium and 600 IU of vitamin D each day. Adults 71 and older need 1,200 mg of calcium and 800 IU of vitamin D each day. ? Eat foods rich in calcium, like yogurt, cheese, milk, and dark green vegetables. This is a good way to get the calcium you need.  You can get vitamin D from eggs, fatty fish, cereal, and milk. ? Talk to your doctor about taking a calcium plus vitamin D supplement. Be careful, though. Adults ages 23 to 48 should not get more than 2,500 mg of calcium and 4,000 IU of vitamin D each day, whether it is from supplements and/or food. Adults ages 46 and older should not get more than 2,000 mg of calcium and 4,000 IU of vitamin D each day from supplements and/or food. · Limit alcohol to 2 drinks a day for men and 1 drink a day for women. Too much alcohol can cause health problems. · Do not smoke. Smoking puts you at a much higher risk for osteoporosis. If you need help quitting, talk to your doctor about stop-smoking programs and medicines. These can increase your chances of quitting for good. · Get regular bone-building exercise. Weight-bearing and resistance exercises keep bones healthy by working the muscles and bones against gravity. Start out at an exercise level that feels right for you. Add a little at a time until you can do the following:  ? Do 30 minutes of weight-bearing exercise on most days of the week. Walking, jogging, stair climbing, and dancing are good choices. ? Do resistance exercises with weights or elastic bands 2 to 3 days a week. · Reduce your risk of falls:  ? Wear supportive shoes with low heels and nonslip soles. ? Use a cane or walker, if you need it. Use shower chairs and bath benches. Put in handrails on stairways, around your shower or tub area, and near the toilet. ? Keep stairs, porches, and walkways well lit. Use night-lights. ? Remove throw rugs and other objects that are in the way. ? Avoid icy, wet, or slippery surfaces. ? Keep a cordless phone and a flashlight with new batteries by your bed. When should you call for help? Watch closely for changes in your health, and be sure to contact your doctor if you have any problems. Where can you learn more? Go to http://audra.info/.   Enter K100 in the search box to learn more about \"Osteoporosis: Care Instructions. \"  Current as of: March 15, 2018  Content Version: 11.9  © 4406-5067 [employer]. Care instructions adapted under license by [employer] (which disclaims liability or warranty for this information). If you have questions about a medical condition or this instruction, always ask your healthcare professional. [address] any warranty or liability for your use of this information. Bacterial Vaginosis: Care Instructions  Your Care Instructions    Bacterial vaginosis is a type of vaginal infection. It is caused by excess growth of certain bacteria that are normally found in the vagina. Symptoms can include itching, swelling, pain when you urinate or have sex, and a gray or yellow discharge with a \"fishy\" odor. It is not considered an infection that is spread through sexual contact. Although symptoms can be annoying and uncomfortable, bacterial vaginosis does not usually cause other health problems. However, if you have it while you are pregnant, it can cause complications. While the infection may go away on its own, most doctors use antibiotics to treat it. You may have been prescribed pills or vaginal cream. With treatment, bacterial vaginosis usually clears up in 5 to 7 days. Follow-up care is a key part of your treatment and safety. Be sure to make and go to all appointments, and call your doctor if you are having problems. It's also a good idea to know your test results and keep a list of the medicines you take. How can you care for yourself at home? · Take your antibiotics as directed. Do not stop taking them just because you feel better. You need to take the full course of antibiotics. · Do not eat or drink anything that contains alcohol if you are taking metronidazole (Flagyl). · Keep using your medicine if you start your period.  Use pads instead of tampons while using a vaginal cream or suppository. Tampons can absorb the medicine. · Wear loose cotton clothing. Do not wear nylon and other materials that hold body heat and moisture close to the skin. · Do not scratch. Relieve itching with a cold pack or a cool bath. · Do not wash your vaginal area more than once a day. Use plain water or a mild, unscented soap. Do not douche. When should you call for help? Watch closely for changes in your health, and be sure to contact your doctor if:    · You have unexpected vaginal bleeding.     · You have a fever.     · You have new or increased pain in your vagina or pelvis.     · You are not getting better after 1 week.     · Your symptoms return after you finish the course of your medicine. Where can you learn more? Go to http://flora-sarkis.info/. Domingo Almeida in the search box to learn more about \"Bacterial Vaginosis: Care Instructions. \"  Current as of: May 14, 2018  Content Version: 11.9  © 2260-5394 [employer]. Care instructions adapted under license by [employer] (which disclaims liability or warranty for this information). If you have questions about a medical condition or this instruction, always ask your healthcare professional. [place] [address] any warranty or liability for your use of this information.

## 2019-01-30 NOTE — ACP (ADVANCE CARE PLANNING)
Advance Care Planning (ACP) Provider Conversation Snapshot    Date of ACP Conversation: 01/30/19  Persons included in Conversation:  patient  Length of ACP Conversation in minutes:  <16 minutes (Non-Billable)    Authorized Decision Maker (if patient is incapable of making informed decisions): This person is: Other Legally Authorized Decision Maker (e.g. Next of Kin)            For Patients with Decision Making Capacity: \"In these circumstances, what matters most to you? \"  Care focused more on comfort or quality of life.     Conversation Outcomes / Follow-Up Plan:   Recommended completion of Advance Directive form after review of ACP materials and conversation with prospective healthcare agent
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
## 2019-02-06 ENCOUNTER — TELEPHONE (OUTPATIENT)
Dept: CARDIOLOGY CLINIC | Age: 59
End: 2019-02-06

## 2019-02-06 NOTE — TELEPHONE ENCOUNTER
Lm on  to inform patient      ----- Message from Doug Anderson MD sent at 2/5/2019  3:37 PM EST -----  Regarding: RE: echo testing   She doesn't need one before her apt next week. Her EF was normal and aortic root unchanged. thx    ----- Message -----  From: Joaquin Figueroa LPN  Sent: 0/5/9661   2:40 PM  To: Doug Anderson MD  Subject: echo testing                                     Patient has an upcoming appt next week to see you. She called today and wanted to know if she is to have an echo. I believe you were under the impression she was having a repeat echo from her last one in Aug 2018. However, she did not. It does not look like one was ordered. Do you want her to have an echo on the day of or before you see her?

## 2019-02-14 ENCOUNTER — OFFICE VISIT (OUTPATIENT)
Dept: CARDIOLOGY CLINIC | Age: 59
End: 2019-02-14

## 2019-02-14 VITALS
SYSTOLIC BLOOD PRESSURE: 124 MMHG | HEIGHT: 72 IN | BODY MASS INDEX: 39.68 KG/M2 | DIASTOLIC BLOOD PRESSURE: 80 MMHG | OXYGEN SATURATION: 98 % | HEART RATE: 59 BPM | WEIGHT: 293 LBS

## 2019-02-14 DIAGNOSIS — R01.1 HEART MURMUR: ICD-10-CM

## 2019-02-14 DIAGNOSIS — E66.01 CLASS 3 SEVERE OBESITY WITH BODY MASS INDEX (BMI) OF 50.0 TO 59.9 IN ADULT, UNSPECIFIED OBESITY TYPE, UNSPECIFIED WHETHER SERIOUS COMORBIDITY PRESENT (HCC): ICD-10-CM

## 2019-02-14 DIAGNOSIS — I71.21 ASCENDING AORTIC ANEURYSM: Primary | ICD-10-CM

## 2019-02-14 DIAGNOSIS — E78.5 HYPERLIPIDEMIA WITH TARGET LDL LESS THAN 130: ICD-10-CM

## 2019-02-14 DIAGNOSIS — E07.9 THYROID DISEASE: ICD-10-CM

## 2019-02-14 DIAGNOSIS — R00.0 TACHYCARDIA: ICD-10-CM
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
## 2019-03-06 ENCOUNTER — OFFICE VISIT (OUTPATIENT)
Dept: FAMILY MEDICINE CLINIC | Age: 59
End: 2019-03-06

## 2019-03-06 ENCOUNTER — HOSPITAL ENCOUNTER (OUTPATIENT)
Dept: LAB | Age: 59
Discharge: HOME | End: 2019-03-06
Attending: INTERNAL MEDICINE
Payer: MEDICARE

## 2019-03-06 VITALS
HEART RATE: 52 BPM | SYSTOLIC BLOOD PRESSURE: 156 MMHG | RESPIRATION RATE: 16 BRPM | DIASTOLIC BLOOD PRESSURE: 87 MMHG | OXYGEN SATURATION: 98 % | TEMPERATURE: 97.4 F

## 2019-03-06 DIAGNOSIS — R73.03 PREDIABETES: ICD-10-CM

## 2019-03-06 DIAGNOSIS — R10.11 RUQ PAIN: ICD-10-CM

## 2019-03-06 DIAGNOSIS — E66.01 MORBID OBESITY WITH BMI OF 50.0-59.9, ADULT (HCC): ICD-10-CM

## 2019-03-06 DIAGNOSIS — H60.333 ACUTE SWIMMER'S EAR OF BOTH SIDES: Primary | ICD-10-CM

## 2019-03-06 DIAGNOSIS — E03.9 ACQUIRED HYPOTHYROIDISM: ICD-10-CM

## 2019-03-06 LAB
ALBUMIN SERPL-MCNC: 3.7 G/DL (ref 3.4–5)
ALBUMIN/GLOB SERPL: 1.2 {RATIO} (ref 0.8–1.7)
ALP SERPL-CCNC: 73 U/L (ref 45–117)
ALT SERPL-CCNC: 22 U/L (ref 13–56)
ANION GAP SERPL CALC-SCNC: 8 MMOL/L (ref 3–18)
AST SERPL-CCNC: 15 U/L (ref 15–37)
BASOPHILS # BLD: 0 K/UL (ref 0–0.1)
BASOPHILS NFR BLD: 0 % (ref 0–2)
BILIRUB SERPL-MCNC: 0.3 MG/DL (ref 0.2–1)
BUN SERPL-MCNC: 18 MG/DL (ref 7–18)
BUN/CREAT SERPL: 20 (ref 12–20)
CALCIUM SERPL-MCNC: 8.4 MG/DL (ref 8.5–10.1)
CHLORIDE SERPL-SCNC: 102 MMOL/L (ref 100–108)
CHOLEST SERPL-MCNC: 276 MG/DL
CO2 SERPL-SCNC: 27 MMOL/L (ref 21–32)
CREAT SERPL-MCNC: 0.88 MG/DL (ref 0.6–1.3)
DIFFERENTIAL METHOD BLD: ABNORMAL
EOSINOPHIL # BLD: 0.3 K/UL (ref 0–0.4)
EOSINOPHIL NFR BLD: 4 % (ref 0–5)
ERYTHROCYTE [DISTWIDTH] IN BLOOD BY AUTOMATED COUNT: 14.7 % (ref 11.6–14.5)
EST. AVERAGE GLUCOSE BLD GHB EST-MCNC: 117 MG/DL
GLOBULIN SER CALC-MCNC: 3.1 G/DL (ref 2–4)
GLUCOSE SERPL-MCNC: 84 MG/DL (ref 74–99)
HBA1C MFR BLD: 5.7 % (ref 4.2–5.6)
HCT VFR BLD AUTO: 44.3 % (ref 35–45)
HDLC SERPL-MCNC: 37 MG/DL (ref 40–60)
HDLC SERPL: 7.5 {RATIO} (ref 0–5)
HGB BLD-MCNC: 14.3 G/DL (ref 12–16)
LDLC SERPL CALC-MCNC: 175.8 MG/DL (ref 0–100)
LIPID PROFILE,FLP: ABNORMAL
LYMPHOCYTES # BLD: 2 K/UL (ref 0.9–3.6)
LYMPHOCYTES NFR BLD: 31 % (ref 21–52)
MCH RBC QN AUTO: 28.8 PG (ref 24–34)
MCHC RBC AUTO-ENTMCNC: 32.3 G/DL (ref 31–37)
MCV RBC AUTO: 89.1 FL (ref 74–97)
MONOCYTES # BLD: 0.3 K/UL (ref 0.05–1.2)
MONOCYTES NFR BLD: 5 % (ref 3–10)
NEUTS SEG # BLD: 3.9 K/UL (ref 1.8–8)
NEUTS SEG NFR BLD: 60 % (ref 40–73)
PLATELET # BLD AUTO: 311 K/UL (ref 135–420)
PMV BLD AUTO: 10.1 FL (ref 9.2–11.8)
POTASSIUM SERPL-SCNC: 4.6 MMOL/L (ref 3.5–5.5)
PROT SERPL-MCNC: 6.8 G/DL (ref 6.4–8.2)
RBC # BLD AUTO: 4.97 M/UL (ref 4.2–5.3)
SODIUM SERPL-SCNC: 137 MMOL/L (ref 136–145)
T4 FREE SERPL-MCNC: 1.1 NG/DL (ref 0.7–1.5)
TRIGL SERPL-MCNC: 316 MG/DL (ref ?–150)
TSH SERPL DL<=0.05 MIU/L-ACNC: 1 UIU/ML (ref 0.36–3.74)
VLDLC SERPL CALC-MCNC: 63.2 MG/DL
WBC # BLD AUTO: 6.5 K/UL (ref 4.6–13.2)

## 2019-03-06 PROCEDURE — 83036 HEMOGLOBIN GLYCOSYLATED A1C: CPT

## 2019-03-06 PROCEDURE — 85025 COMPLETE CBC W/AUTO DIFF WBC: CPT

## 2019-03-06 PROCEDURE — 80061 LIPID PANEL: CPT

## 2019-03-06 PROCEDURE — 36415 COLL VENOUS BLD VENIPUNCTURE: CPT

## 2019-03-06 PROCEDURE — 84439 ASSAY OF FREE THYROXINE: CPT

## 2019-03-06 PROCEDURE — 80053 COMPREHEN METABOLIC PANEL: CPT

## 2019-03-06 PROCEDURE — 84443 ASSAY THYROID STIM HORMONE: CPT

## 2019-03-06 RX ORDER — NEOMYCIN SULFATE, POLYMYXIN B SULFATE AND HYDROCORTISONE 10; 3.5; 1 MG/ML; MG/ML; [USP'U]/ML
3 SUSPENSION/ DROPS AURICULAR (OTIC) 4 TIMES DAILY
Qty: 10 ML | Refills: 0 | Status: SHIPPED | OUTPATIENT
Start: 2019-03-06 | End: 2019-03-16

## 2019-03-06 RX ORDER — NEOMYCIN SULFATE, POLYMYXIN B SULFATE AND HYDROCORTISONE 10; 3.5; 1 MG/ML; MG/ML; [USP'U]/ML
3 SUSPENSION/ DROPS AURICULAR (OTIC) 4 TIMES DAILY
Qty: 10 ML | Refills: 0 | Status: SHIPPED | OUTPATIENT
Start: 2019-03-06 | End: 2019-03-06 | Stop reason: SDUPTHER

## 2019-03-06 NOTE — PROGRESS NOTES
1. Have you been to the ER, urgent care clinic since your last visit? Hospitalized since your last visit? No    2. Have you seen or consulted any other health care providers outside of the [address] [place] [address] since your last visit? Include any pap smears or colon screening.  No     Chief Complaint   Patient presents with    Ear Pain      last week complain water left ear     Abdominal Pain     last 5 days right upper abdominal pain, bloated, and complain nausea

## 2019-03-06 NOTE — PATIENT INSTRUCTIONS
Abdominal Pain: Care Instructions  Your Care Instructions    Abdominal pain has many possible causes. Some aren't serious and get better on their own in a few days. Others need more testing and treatment. If your pain continues or gets worse, you need to be rechecked and may need more tests to find out what is wrong. You may need surgery to correct the problem. Don't ignore new symptoms, such as fever, nausea and vomiting, urination problems, pain that gets worse, and dizziness. These may be signs of a more serious problem. Your doctor may have recommended a follow-up visit in the next 8 to 12 hours. If you are not getting better, you may need more tests or treatment. The doctor has checked you carefully, but problems can develop later. If you notice any problems or new symptoms, get medical treatment right away. Follow-up care is a key part of your treatment and safety. Be sure to make and go to all appointments, and call your doctor if you are having problems. It's also a good idea to know your test results and keep a list of the medicines you take. How can you care for yourself at home? · Rest until you feel better. · To prevent dehydration, drink plenty of fluids, enough so that your urine is light yellow or clear like water. Choose water and other caffeine-free clear liquids until you feel better. If you have kidney, heart, or liver disease and have to limit fluids, talk with your doctor before you increase the amount of fluids you drink. · If your stomach is upset, eat mild foods, such as rice, dry toast or crackers, bananas, and applesauce. Try eating several small meals instead of two or three large ones. · Wait until 48 hours after all symptoms have gone away before you have spicy foods, alcohol, and drinks that contain caffeine. · Do not eat foods that are high in fat. · Avoid anti-inflammatory medicines such as aspirin, ibuprofen (Advil, Motrin), and naproxen (Aleve).  These can cause stomach upset. Talk to your doctor if you take daily aspirin for another health problem. When should you call for help? Call 911 anytime you think you may need emergency care. For example, call if:    · You passed out (lost consciousness).     · You pass maroon or very bloody stools.     · You vomit blood or what looks like coffee grounds.     · You have new, severe belly pain.    Call your doctor now or seek immediate medical care if:    · Your pain gets worse, especially if it becomes focused in one area of your belly.     · You have a new or higher fever.     · Your stools are black and look like tar, or they have streaks of blood.     · You have unexpected vaginal bleeding.     · You have symptoms of a urinary tract infection. These may include:  ? Pain when you urinate. ? Urinating more often than usual.  ? Blood in your urine.     · You are dizzy or lightheaded, or you feel like you may faint.    Watch closely for changes in your health, and be sure to contact your doctor if:    · You are not getting better after 1 day (24 hours). Where can you learn more? Go to http://[employer].info/. Enter N758 in the search box to learn more about \"Abdominal Pain: Care Instructions. \"  Current as of: September 23, 2018  Content Version: 11.9  © 8787-1410 [employer]. Care instructions adapted under license by [employer] (which disclaims liability or warranty for this information). If you have questions about a medical condition or this instruction, always ask your healthcare professional. [place] [address] any warranty or liability for your use of this information. Keeping Ears Dry: Care Instructions  Your Care Instructions  Your doctor wants you to keep water from getting into your ears. You may need to do this because of a ruptured eardrum, an ear infection, or other ear problems. Follow-up care is a key part of your treatment and safety.  Be sure to make and go to all appointments, and call your doctor if you are having problems. It's also a good idea to know your test results and keep a list of the medicines you take. How can you care for yourself at home? · Take baths until your doctor says you can take showers again. Avoid getting water in the ear until after the problem clears up. Ask your doctor if you should use earplugs to keep water out of your ears. · Do not swim until your doctor says you can. · If you get water in your ears, turn your head to each side and pull the earlobe in different directions. This will help the water run out. If your ears are still wet, use a hair dryer set on the lowest heat. Hold the dryer several inches from your ear. · Use your medicines exactly as prescribed. Call your doctor if you think you are having a problem with your medicine. Do not put drops in your ears unless your doctor prescribes them. When should you call for help? Watch closely for changes in your health, and be sure to contact your doctor if you have any problems. Where can you learn more? Go to http://[employer].info/. Enter P973 in the search box to learn more about \"Keeping Ears Dry: Care Instructions. \"  Current as of: March 27, 2018  Content Version: 11.9  © 4640-6180 [employer]. Care instructions adapted under license by [employer] (which disclaims liability or warranty for this information). If you have questions about a medical condition or this instruction, always ask your healthcare professional. [place] [address] any warranty or liability for your use of this information.

## 2019-03-06 NOTE — PROGRESS NOTES
Jose M               Demetria Medrano               [phone]          Subjective:   Cleveland Leal is a 63 y.o. female here for an acute visit for    Chief Complaint   Patient presents with    Ear Pain      last week complain water left ear     Abdominal Pain     last 5 days right upper abdominal pain, bloated, and complain nausea       HPI  ears  Onset 5 days ago   Location left   Duration constant   Characteristics In aqua therapy, can hear water in ear but cannot get out, starting to hurt a little, feels like listening throught cotton    Aggrevating nothing   Relieving nothing   Treatment H2O2, alcohol, almond oil, suction with hand   Pertinent PMH Never had before, had swimmers ear   Pertinent negatives Fever, drainage, headache       Abdominal pain  Start: 1 week ago  Constant  Located in RUQ abdomen  Tender, nausea, heacache, bloating, anorexia due to nausea  No fever, chills last week, vomiting    ROS  As above, the rest are negative   ROS    Current Outpatient Medications   Medication Sig Dispense Refill    MILK THISTLE PO Take  by mouth.  neomycin-polymyxin-hydrocortisone, buffered, (PEDIOTIC) 3.5-10,000-1 mg/mL-unit/mL-% otic suspension Administer 3 Drops into each ear four (4) times daily for 10 days. 10 mL 0    polyethylene glycol (MIRALAX) 17 gram/dose powder Take 17 g by mouth daily. 850 g 1    traZODone (DESYREL) 150 mg tablet Take 1 Tab by mouth three (3) times daily. 90 Tab 1    omeprazole (PRILOSEC) 20 mg capsule Take 1 capsule by mouth twice daily      tretinoin (RETIN-A) 0.05 % topical cream Apply to affected area at night time. 45 g 0    levothyroxine (SYNTHROID) 50 mcg tablet 50 mcg Daily (before breakfast).  psyllium (METAMUCIL) packet Take 1 Packet by mouth as needed.  TURMERIC, BULK, Take 40 mg by mouth daily.  cinnamon bark, bulk, powd Take 40 mg by mouth daily.       LORazepam (ATIVAN) 1 mg tablet Take 1 Tab by mouth daily as needed for Anxiety (Severe). (Patient taking differently: Take 1 mg by mouth nightly.) 30 Tab 0    Cholecalciferol, Vitamin D3, 50,000 unit cap Take  by mouth Every Mon, Wed & Sun.      VITAMIN B COMPLEX PO Take 1 Cap by mouth daily.  MAGNESIUM PO Take 400 mg by mouth daily.  omega-3 fatty acids-vitamin e (FISH OIL) 1,000 mg Cap Take 1 Cap by mouth daily.  flaxseed oil 1,000 mg Cap Take 1,000 mg by mouth daily.  UBIDECARENONE/VITAMIN E MIXED (COQ10  PO) Take 1 Tab by mouth daily.  vitamin e (AQUA GEMS) 200 unit capsule Take 200 Units by mouth daily.  ascorbic acid (VITAMIN C) 250 mg tablet Take 250 mg by mouth daily.  varicella-zoster recombinant, PF, (SHINGRIX, PF,) 50 mcg/0.5 mL susr injection 0.5mL by IntraMUSCular route once now and then repeat in 2-6 months 0.5 mL 1    ketoconazole (NIZORAL) 2 % topical cream Apply  to affected area two (2) times a day. Apply to affected area twice a day 60 g 1    ketoconazole (NIZORAL) 2 % topical cream Apply  to affected area daily. Apply to affected area daily 15 g 0    oxyCODONE (OXYIR) 5 mg capsule Take 5 mg by mouth two (2) times daily as needed.  ibuprofen (MOTRIN) 800 mg tablet Take 1 Tab by mouth three (3) times daily (with meals). Indications: Pain 90 Tab 3    esomeprazole (NEXIUM) 40 mg capsule Take 1 Cap by mouth daily. Indications: Heartburn (Patient taking differently: Take 40 mg by mouth as needed. Indications: Heartburn) 30 Cap 0    OTHER Ground seaweed - 1 tsp. Daily.  clotrimazole-betamethasone (LOTRISONE) topical cream Apply  to affected area two (2) times a day.  (Patient taking differently: Apply  to affected area two (2) times daily as needed.) 45 g 2          Patient Active Problem List   Diagnosis Code    Depression F32.9    Morbid obesity ([facility] [address] E66.01    Compulsive overeating F50.2    Borderline hypertension R03.0    Sexual dysfunction R37    Heart murmur R01.1    Tachycardia R00.0    Hamstring injury S76.309A    Dizziness R42    Hyperlipidemia with target LDL less than 130 E78.5    ABNORMAL VAGINAL BLEEDING  N93.9    Constipation K59.00    Vitamin B12 deficiency E53.8    Complex tear of medial meniscus of right knee as current injury/ s/p surgery has chronic rt knee pain S83.231A    BMI 60.0-69.9, adult ([facility]) Z68.44    Chronic pain/ lower back pain/ bilateral knee G89.29    PCOD (polycystic ovarian disease) E28.2    Hirsutism L68.0    Inconclusive mammogram R92.2    Ascending aortic aneurysm ([facility]) I71.2    Prediabetes R73.03    IBS (irritable bowel syndrome) K58.9    Left breast mass N63.20    Osteoarthritis of multiple joints M15.9    Multiple thyroid nodules E04.2    Fatty liver K76.0    Breast mass, right-Medially N63.10    Hiatal hernia K44.9    Bloating R14.0    Encounter for screening colonoscopy Z12.11    Recurrent depression ([facility]) F33.9    Lumbar spondylosis M47.816    Lumbar facet arthropathy M47.816    Lumbar degenerative disc disease M51.36    Chronic pain syndrome G89.4    Primary osteoarthritis of both knees M17.0    Chronic pain of right knee M25.561, G89.29    Chronic pain of left knee M25.562, G89.29       Allergies   Allergen Reactions    Oxycodone Anaphylaxis and Hives    Adhesive Tape-Silicones Rash     Paper tape is okay to use.     Celebrex [Celecoxib] Hives    Contrast Dye [Iodine] Nausea Only     Abdominal pain, dizziness    Cortisone Nausea and Vomiting    Flexeril [Cyclobenzaprine] Nausea and Vomiting     Pt states also causes confusion    Keflex [Cephalexin] Diarrhea and Nausea Only     Joint pain    Midazolam Nausea Only     Dizziness     Family History   Problem Relation Age of Onset    Stroke Mother         TIAs    Hypertension Mother     Dementia Mother     Heart Disease Father         MI    Diabetes Father     Cancer Paternal Uncle         Colon       Objective:     Vitals:    03/06/19 1302   BP: 156/87   Pulse: (!) 52 Resp: 16   Temp: 97.4 °F (36.3 °C)   SpO2: 98%     There is no height or weight on file to calculate BMI. Physical Exam  Physical Exam   Constitutional: She is oriented to person, place, and time and well-developed, well-nourished, and in no distress. Vital signs are normal.   HENT:   Right Ear: Hearing, tympanic membrane and external ear normal.   Left Ear: Hearing, tympanic membrane and external ear normal.   Bilateral canals tender and erythemas   Neck: Normal range of motion. No JVD present. Cardiovascular: Normal rate, regular rhythm, normal heart sounds and intact distal pulses. Exam reveals no gallop and no friction rub. No murmur heard. Pulmonary/Chest: Effort normal and breath sounds normal.   Abdominal: There is tenderness in the right upper quadrant. RUQ appears to be swollen when compared to left side  extremely tender to palpation   Neurological: She is alert and oriented to person, place, and time. Skin: Skin is warm and dry. Psychiatric: Memory, affect and judgment normal.   Nursing note and vitals reviewed.         Labwork and Ancillary Studies:    CBC w/Diff  Lab Results   Component Value Date/Time    WBC 7.8 05/10/2018 03:59 PM    HGB 14.5 05/10/2018 03:59 PM    PLATELET 130 78/42/0627 03:59 PM         Basic Metabolic Profile  Lab Results   Component Value Date/Time    Sodium 138 05/10/2018 03:59 PM    Potassium 4.5 05/10/2018 03:59 PM    Chloride 103 05/10/2018 03:59 PM    CO2 28 05/10/2018 03:59 PM    Anion gap 7 05/10/2018 03:59 PM    Glucose 95 05/10/2018 03:59 PM    BUN 14 05/10/2018 03:59 PM    Creatinine 1.00 05/10/2018 03:59 PM    BUN/Creatinine ratio 14 05/10/2018 03:59 PM    GFR est AA >60 05/10/2018 03:59 PM    GFR est non-AA 57 (L) 05/10/2018 03:59 PM    Calcium 8.3 (L) 05/10/2018 03:59 PM        Cholesterol  Lab Results   Component Value Date/Time    Cholesterol, total 276 (H) 05/10/2018 03:59 PM    HDL Cholesterol 35 (L) 05/10/2018 03:59 PM    LDL, calculated 170.4 (H) 05/10/2018 03:59 PM    Triglyceride 353 (H) 05/10/2018 03:59 PM    CHOL/HDL Ratio 7.9 (H) 05/10/2018 03:59 PM          Assessment/Plan:    Diagnoses and all orders for this visit:    1. Acute swimmer's ear of both sides  -     neomycin-polymyxin-hydrocortisone, buffered, (PEDIOTIC) 3.5-10,000-1 mg/mL-unit/mL-% otic suspension; Administer 3 Drops into each ear four (4) times daily for 10 days. 2. RUQ pain  -     US ABD COMP; Future    Bilat TM normal, most likely swimmers ear, drops given, instructed to keep her head out of the water until the symptoms have resolved  Abdominal pain: has a history of liver problems and RUQ pain. She has spoken to her gastroenterologist and they do not have an opening for a couple of weeks. Per her the GI doctor asked her to request and ultrasound. After my assessment I agree an ultrasound would be prudent. Health Maintenance:   Health Maintenance   Topic Date Due    Shingrix Vaccine Age 50> (1 of 2) 07/28/2011    PAP AKA CERVICAL CYTOLOGY  08/09/2019    MEDICARE YEARLY EXAM  01/31/2020    BREAST CANCER SCRN MAMMOGRAM  08/09/2020    DTaP/Tdap/Td series (2 - Td) 10/29/2020    COLONOSCOPY  11/15/2022    Hepatitis C Screening  Completed       I have discussed the diagnosis with the patient and the intended plan as seen in the above orders. The patient has received an After-Visit Summary and questions were answered concerning future plans. Return to clinic if sxs persist, to ER if sxs worsen    Patient verbalized understanding to above instructions. AVS printed and given to pt. Follow-up Disposition:  Return if symptoms worsen or fail to improve. July Robledo, AGNP-BC  [facility] [place] [facility] [place]   [address]  [id number]

## 2019-03-07 ENCOUNTER — HOSPITAL ENCOUNTER (OUTPATIENT)
Dept: ULTRASOUND IMAGING | Age: 59
Discharge: HOME | End: 2019-03-07
Attending: NURSE PRACTITIONER
Payer: MEDICARE

## 2019-03-07 DIAGNOSIS — R10.11 RUQ PAIN: ICD-10-CM

## 2019-03-07 PROCEDURE — 76700 US EXAM ABDOM COMPLETE: CPT

## 2019-05-14 ENCOUNTER — HOSPITAL ENCOUNTER (OUTPATIENT)
Dept: LAB | Age: 59
Discharge: HOME | End: 2019-05-14
Payer: MEDICARE

## 2019-05-14 LAB
25(OH)D3 SERPL-MCNC: 94.2 NG/ML (ref 30–100)
T4 FREE SERPL-MCNC: 1 NG/DL (ref 0.7–1.5)
TSH SERPL DL<=0.05 MIU/L-ACNC: 1.49 UIU/ML (ref 0.36–3.74)

## 2019-05-14 PROCEDURE — 82306 VITAMIN D 25 HYDROXY: CPT

## 2019-05-14 PROCEDURE — 84439 ASSAY OF FREE THYROXINE: CPT

## 2019-05-14 PROCEDURE — 84443 ASSAY THYROID STIM HORMONE: CPT

## 2019-05-14 PROCEDURE — 36415 COLL VENOUS BLD VENIPUNCTURE: CPT

## 2019-06-06 ENCOUNTER — HOSPITAL ENCOUNTER (OUTPATIENT)
Dept: LAB | Age: 59
Discharge: HOME | End: 2019-06-06
Payer: MEDICARE

## 2019-06-06 PROCEDURE — 80307 DRUG TEST PRSMV CHEM ANLYZR: CPT

## 2019-06-11 LAB — DRUGS UR: NORMAL

## 2019-07-01 ENCOUNTER — OFFICE VISIT (OUTPATIENT)
Dept: VASCULAR SURGERY | Age: 59
End: 2019-07-01

## 2019-07-01 VITALS
HEIGHT: 72 IN | WEIGHT: 293 LBS | HEART RATE: 62 BPM | RESPIRATION RATE: 16 BRPM | DIASTOLIC BLOOD PRESSURE: 70 MMHG | BODY MASS INDEX: 39.68 KG/M2 | SYSTOLIC BLOOD PRESSURE: 134 MMHG

## 2019-07-01 DIAGNOSIS — I83.893 VARICOSE VEINS OF BILATERAL LOWER EXTREMITIES WITH OTHER COMPLICATIONS: Primary | ICD-10-CM

## 2019-07-01 DIAGNOSIS — I71.21 ASCENDING AORTIC ANEURYSM: ICD-10-CM

## 2019-07-01 DIAGNOSIS — I87.2 CHRONIC VENOUS INSUFFICIENCY: ICD-10-CM

## 2019-07-01 NOTE — PROGRESS NOTES
1. Have you been to an emergency room or urgent care clinic since your last visit? yes  Hospitalized since your last visit? If yes, where, when, and reason for visit?   no  2. Have you seen or consulted any other health care providers outside of the [facility] since your last visit including any procedures, health maintenance items. If yes, where, when and reason for visit?
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
## 2019-07-01 NOTE — LETTER
7/1/19 Patient: Anamaria Johnson YOB: 1960 Date of Visit: 7/1/2019 Dilip Garland NP 
[address] [id number] VIA In Basket Dear Dilip Garland NP, Thank you for referring Ms. Jami Marcum to [facility] VEIN/VASCULAR SPEC-PORTS for evaluation. My notes for this consultation are attached. If you have questions, please do not hesitate to call me. I look forward to following your patient along with you. Sincerely, Kaya Coffey MD

## 2019-07-05 ENCOUNTER — HOSPITAL ENCOUNTER (OUTPATIENT)
Dept: LAB | Age: 59
Discharge: HOME | End: 2019-07-05
Payer: MEDICARE

## 2019-07-05 DIAGNOSIS — G89.4 CHRONIC PAIN SYNDROME: ICD-10-CM

## 2019-07-05 LAB
AMPHET UR QL SCN: NEGATIVE
BARBITURATES UR QL SCN: NEGATIVE
BENZODIAZ UR QL: NEGATIVE
CANNABINOIDS UR QL SCN: NEGATIVE
COCAINE UR QL SCN: NEGATIVE
HDSCOM,HDSCOM: NORMAL
METHADONE UR QL: NEGATIVE
OPIATES UR QL: NEGATIVE
PCP UR QL: NEGATIVE

## 2019-07-05 PROCEDURE — 80307 DRUG TEST PRSMV CHEM ANLYZR: CPT

## 2019-09-24 PROBLEM — Z12.11 ENCOUNTER FOR SCREENING COLONOSCOPY: Status: RESOLVED | Noted: 2017-11-15 | Resolved: 2019-09-24

## 2019-10-16 ENCOUNTER — DOCUMENTATION ONLY (OUTPATIENT)
Dept: PRIMARY CARE CLINIC | Age: 59
End: 2019-10-16

## 2019-10-16 ENCOUNTER — OFFICE VISIT (OUTPATIENT)
Dept: FAMILY MEDICINE CLINIC | Age: 59
End: 2019-10-16

## 2019-10-16 VITALS
DIASTOLIC BLOOD PRESSURE: 68 MMHG | OXYGEN SATURATION: 95 % | BODY MASS INDEX: 39.68 KG/M2 | SYSTOLIC BLOOD PRESSURE: 128 MMHG | RESPIRATION RATE: 14 BRPM | TEMPERATURE: 98.7 F | WEIGHT: 293 LBS | HEIGHT: 72 IN | HEART RATE: 60 BPM

## 2019-10-16 DIAGNOSIS — R00.0 TACHYCARDIA: ICD-10-CM

## 2019-10-16 DIAGNOSIS — F33.9 RECURRENT DEPRESSION (HCC): ICD-10-CM

## 2019-10-16 DIAGNOSIS — K04.7 INFECTED TOOTH: Primary | ICD-10-CM

## 2019-10-16 DIAGNOSIS — H60.332 ACUTE SWIMMER'S EAR OF LEFT SIDE: ICD-10-CM

## 2019-10-16 RX ORDER — NEOMYCIN SULFATE, POLYMYXIN B SULFATE AND HYDROCORTISONE 10; 3.5; 1 MG/ML; MG/ML; [USP'U]/ML
3 SUSPENSION/ DROPS AURICULAR (OTIC) 4 TIMES DAILY
Qty: 10 ML | Refills: 0 | Status: SHIPPED | OUTPATIENT
Start: 2019-10-16 | End: 2019-10-26

## 2019-10-16 RX ORDER — CLINDAMYCIN HYDROCHLORIDE 300 MG/1
300 CAPSULE ORAL 3 TIMES DAILY
Qty: 30 CAP | Refills: 0 | Status: SHIPPED | OUTPATIENT
Start: 2019-10-16 | End: 2019-10-26

## 2019-10-16 NOTE — PATIENT INSTRUCTIONS
Keeping Ears Dry: Care Instructions  Your Care Instructions  Your doctor wants you to keep water from getting into your ears. You may need to do this because of a ruptured eardrum, an ear infection, or other ear problems. Follow-up care is a key part of your treatment and safety. Be sure to make and go to all appointments, and call your doctor if you are having problems. It's also a good idea to know your test results and keep a list of the medicines you take. How can you care for yourself at home? · Take baths until your doctor says you can take showers again. Avoid getting water in the ear until after the problem clears up. Ask your doctor if you should use earplugs to keep water out of your ears. · Do not swim until your doctor says you can. · If you get water in your ears, turn your head to each side and pull the earlobe in different directions. This will help the water run out. If your ears are still wet, use a hair dryer set on the lowest heat. Hold the dryer several inches from your ear. · Use your medicines exactly as prescribed. Call your doctor if you think you are having a problem with your medicine. Do not put drops in your ears unless your doctor prescribes them. When should you call for help? Watch closely for changes in your health, and be sure to contact your doctor if you have any problems. Where can you learn more? Go to http://flora-sarkis.info/. Enter J873 in the search box to learn more about \"Keeping Ears Dry: Care Instructions. \"  Current as of: October 21, 2018  Content Version: 12.2  © 8141-2697 [employer]. Care instructions adapted under license by [employer] (which disclaims liability or warranty for this information). If you have questions about a medical condition or this instruction, always ask your healthcare professional. [address] any warranty or liability for your use of this information. Swimmer's Ear: Care Instructions  Your Care Instructions    Swimmer's ear (otitis externa) is inflammation or infection of the ear canal. This is the passage that leads from the outer ear to the eardrum. Any water, sand, or other debris that gets into the ear canal and stays there can cause swimmer's ear. Putting cotton swabs or other items in the ear to clean it can also cause this problem. Swimmer's ear can be very painful. But you can treat the pain and infection with medicines. You should feel better in a few days. Follow-up care is a key part of your treatment and safety. Be sure to make and go to all appointments, and call your doctor if you are having problems. It's also a good idea to know your test results and keep a list of the medicines you take. How can you care for yourself at home? Cleaning and care  · Use antibiotic drops as your doctor directs. · Do not insert ear drops (other than the antibiotic ear drops) or anything else into the ear unless your doctor has told you to. · Avoid getting water in the ear until the problem clears up. Use cotton lightly coated with petroleum jelly as an earplug. Do not use plastic earplugs. · Use a hair dryer set on low to carefully dry the ear after you shower. · To ease ear pain, hold a warm washcloth against your ear. · Take pain medicines exactly as directed. ? If the doctor gave you a prescription medicine for pain, take it as prescribed. ? If you are not taking a prescription pain medicine, ask your doctor if you can take an over-the-counter medicine. Inserting ear drops  · Warm the drops to body temperature by rolling the container in your hands. Or you can place it in a cup of warm water for a few minutes. · Lie down, with your ear facing up. · Place drops inside the ear. Follow your doctor's instructions (or the directions on the label) for how many drops to use.  Gently wiggle the outer ear or pull the ear up and back to help the drops get into the ear. · It's important to keep the liquid in the ear canal for 3 to 5 minutes. When should you call for help? Call your doctor now or seek immediate medical care if:    · You have a new or higher fever.     · You have new or worse pain, swelling, warmth, or redness around or behind your ear.     · You have new or increasing pus or blood draining from your ear.    Watch closely for changes in your health, and be sure to contact your doctor if:    · You are not getting better after 2 days (48 hours). Where can you learn more? Go to http://flora-sarkis.info/. Enter C706 in the search box to learn more about \"Swimmer's Ear: Care Instructions. \"  Current as of: October 21, 2018  Content Version: 12.2  © 7241-0918 [employer]. Care instructions adapted under license by [employer] (which disclaims liability or warranty for this information). If you have questions about a medical condition or this instruction, always ask your healthcare professional. [address] any warranty or liability for your use of this information.
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
## 2019-10-20 NOTE — ASSESSMENT & PLAN NOTE
Stable, based on history, physical exam and review of pertinent labs, studies and medications; meds reconciled; continue current treatment plan. Key Psychotherapeutic Meds             traZODone (DESYREL) 150 mg tablet (Taking) Take 1 Tab by mouth three (3) times daily. Other Key Behavioral Health Meds             oxyCODONE (OXYIR) 5 mg capsule (Taking) Take 5 mg by mouth two (2) times daily as needed.         Lab Results   Component Value Date/Time    Sodium 137 03/06/2019 02:24 PM    Creatinine 0.88 03/06/2019 02:24 PM    TSH 1.49 05/14/2019 01:31 PM    WBC 6.5 03/06/2019 02:24 PM    ALT (SGPT) 22 03/06/2019 02:24 PM    AST (SGOT) 15 03/06/2019 02:24 PM

## 2019-10-21 ENCOUNTER — APPOINTMENT (OUTPATIENT)
Age: 59
End: 2019-10-21
Attending: NURSE PRACTITIONER
Payer: MEDICARE

## 2019-10-21 ENCOUNTER — OFFICE VISIT (OUTPATIENT)
Dept: VASCULAR SURGERY | Age: 59
End: 2019-10-21

## 2019-10-21 ENCOUNTER — HOSPITAL ENCOUNTER (OUTPATIENT)
Dept: MAMMOGRAPHY | Age: 59
Discharge: HOME | End: 2019-10-21
Attending: NURSE PRACTITIONER
Payer: MEDICARE

## 2019-10-21 VITALS
BODY MASS INDEX: 39.68 KG/M2 | DIASTOLIC BLOOD PRESSURE: 84 MMHG | WEIGHT: 293 LBS | SYSTOLIC BLOOD PRESSURE: 132 MMHG | HEIGHT: 72 IN

## 2019-10-21 DIAGNOSIS — E66.01 MORBID OBESITY (HCC): ICD-10-CM

## 2019-10-21 DIAGNOSIS — Z12.31 SCREENING MAMMOGRAM FOR HIGH-RISK PATIENT: ICD-10-CM

## 2019-10-21 DIAGNOSIS — I87.2 CHRONIC VENOUS INSUFFICIENCY: ICD-10-CM

## 2019-10-21 DIAGNOSIS — I83.893 VARICOSE VEINS OF LOWER EXTREMITIES WITH COMPLICATIONS, BILATERAL: Primary | ICD-10-CM

## 2019-10-21 PROCEDURE — 77063 BREAST TOMOSYNTHESIS BI: CPT

## 2019-10-21 NOTE — PROGRESS NOTES
1. Have you been to an emergency room or urgent care clinic since your last visit? NO  Hospitalized since your last visit? If yes, where, when, and reason for visit? NO  2. Have you seen or consulted any other health care providers outside of the [facility] since your last visit including any procedures, health maintenance items. If yes, where, when and reason for visit?  NO
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
## 2019-10-29 ENCOUNTER — HOSPITAL ENCOUNTER (OUTPATIENT)
Age: 59
Discharge: HOME | End: 2019-10-29
Attending: NURSE PRACTITIONER
Payer: MEDICARE

## 2019-10-29 DIAGNOSIS — M47.812 CERVICAL SPONDYLOARTHRITIS: ICD-10-CM

## 2019-10-29 DIAGNOSIS — M54.12 RADICULOPATHY OF CERVICAL SPINE: ICD-10-CM

## 2019-10-29 PROCEDURE — 72141 MRI NECK SPINE W/O DYE: CPT

## 2020-02-04 ENCOUNTER — TELEPHONE (OUTPATIENT)
Dept: CARDIOTHORACIC SURGERY | Age: 60
End: 2020-02-04

## 2020-02-04 DIAGNOSIS — I71.21 ASCENDING AORTIC ANEURYSM: Primary | ICD-10-CM

## 2020-02-04 NOTE — TELEPHONE ENCOUNTER
Patient called stating that when she tries to schedule her MRI, she is being told it is too old. New order pended to ALECIA Morris for his review/signature.

## 2020-02-13 ENCOUNTER — OFFICE VISIT (OUTPATIENT)
Dept: CARDIOLOGY CLINIC | Age: 60
End: 2020-02-13

## 2020-02-13 VITALS
BODY MASS INDEX: 39.68 KG/M2 | OXYGEN SATURATION: 97 % | SYSTOLIC BLOOD PRESSURE: 142 MMHG | WEIGHT: 293 LBS | HEART RATE: 61 BPM | HEIGHT: 72 IN | DIASTOLIC BLOOD PRESSURE: 90 MMHG

## 2020-02-13 DIAGNOSIS — R01.1 HEART MURMUR: ICD-10-CM

## 2020-02-13 DIAGNOSIS — I71.21 ASCENDING AORTIC ANEURYSM: Primary | ICD-10-CM

## 2020-02-13 DIAGNOSIS — R00.0 TACHYCARDIA: ICD-10-CM

## 2020-02-13 DIAGNOSIS — E78.5 HYPERLIPIDEMIA WITH TARGET LDL LESS THAN 130: ICD-10-CM
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
## 2020-02-13 NOTE — PROGRESS NOTES
Adi Meza presents today for   Chief Complaint   Patient presents with    Chest Pain     1 year follow up    Chest Pain (Angina)     intermittent soreness    Dizziness     sometimes    Leg Swelling     mild       Celsa Garber preferred language for health care discussion is english/other. Is someone accompanying this pt? no    Is the patient using any DME equipment during [address]? no    Depression Screening:  3 most recent PHQ Screens 2/13/2020   Little interest or pleasure in doing things Not at all   Feeling down, depressed, irritable, or hopeless Not at all   Total Score PHQ 2 0       Learning Assessment:  Learning Assessment 2/13/2020   PRIMARY LEARNER Patient   HIGHEST LEVEL OF EDUCATION - PRIMARY LEARNER  -   BARRIERS PRIMARY LEARNER -   CO-LEARNER CAREGIVER -   PRIMARY LANGUAGE ENGLISH   LEARNER PREFERENCE PRIMARY DEMONSTRATION   ANSWERED BY Patient   RELATIONSHIP SELF       Abuse Screening:  Abuse Screening Questionnaire 2/13/2020   Do you ever feel afraid of your partner? N   Are you in a relationship with someone who physically or mentally threatens you? N   Is it safe for you to go home? Y       Fall Risk  Fall Risk Assessment, last 12 mths 2/13/2020   Able to walk? Yes   Fall in past 12 months? No   Fall with injury? -       Pt currently taking Anticoagulant therapy? no    Coordination of Care:  1. Have you been to the ER, urgent care clinic since your last visit? Hospitalized since your last visit? no    2. Have you seen or consulted any other health care providers outside of the [address] [place] [address] since your last visit? Include any pap smears or colon screening.  no

## 2020-02-13 NOTE — PATIENT INSTRUCTIONS
If you have not heard from the central scheduler to schedule your testing in 48 hours, please call [phone].

## 2020-02-14 ENCOUNTER — TELEPHONE (OUTPATIENT)
Dept: CARDIOLOGY CLINIC | Age: 60
End: 2020-02-14

## 2020-02-14 NOTE — TELEPHONE ENCOUNTER
Refer to Dr Sandra Magana , used to see Dr Jorge Masterson. .. Patient did not want me to schedule appt she said she will. .. she requested I call Dr Liliane Hernandez office and let them know Dr Lezama Daily is referring her and patient will be calling to schedule appointment. .. Spoke to Uriel at Dr Liliane Hernandez office. .. she said she has the referral and she is already a patient of theirs. .. patient is schedule to have MRI done and follow up in Aug after MRI.  Oz Romero

## 2020-02-19 ENCOUNTER — HOSPITAL ENCOUNTER (OUTPATIENT)
Dept: LAB | Age: 60
Discharge: HOME | End: 2020-02-19
Payer: MEDICARE

## 2020-02-19 ENCOUNTER — OFFICE VISIT (OUTPATIENT)
Dept: FAMILY MEDICINE CLINIC | Age: 60
End: 2020-02-19

## 2020-02-19 VITALS
RESPIRATION RATE: 18 BRPM | OXYGEN SATURATION: 96 % | TEMPERATURE: 97.5 F | BODY MASS INDEX: 50.4 KG/M2 | SYSTOLIC BLOOD PRESSURE: 137 MMHG | HEART RATE: 52 BPM | HEIGHT: 72 IN | DIASTOLIC BLOOD PRESSURE: 80 MMHG

## 2020-02-19 DIAGNOSIS — E53.8 VITAMIN B12 DEFICIENCY: ICD-10-CM

## 2020-02-19 DIAGNOSIS — D51.8 OTHER VITAMIN B12 DEFICIENCY ANEMIA: ICD-10-CM

## 2020-02-19 DIAGNOSIS — R68.89 COLD FEELING: ICD-10-CM

## 2020-02-19 DIAGNOSIS — E04.2 MULTIPLE THYROID NODULES: ICD-10-CM

## 2020-02-19 DIAGNOSIS — B96.89 BACTERIAL VAGINITIS: ICD-10-CM

## 2020-02-19 DIAGNOSIS — Z00.00 MEDICARE ANNUAL WELLNESS VISIT, SUBSEQUENT: Primary | ICD-10-CM

## 2020-02-19 DIAGNOSIS — Z78.0 POSTMENOPAUSAL STATE: ICD-10-CM

## 2020-02-19 DIAGNOSIS — N76.0 BACTERIAL VAGINITIS: ICD-10-CM

## 2020-02-19 DIAGNOSIS — E55.9 VITAMIN D DEFICIENCY: ICD-10-CM

## 2020-02-19 DIAGNOSIS — E03.9 ACQUIRED HYPOTHYROIDISM: ICD-10-CM

## 2020-02-19 DIAGNOSIS — E78.5 HYPERLIPIDEMIA WITH TARGET LDL LESS THAN 130: ICD-10-CM

## 2020-02-19 DIAGNOSIS — R73.03 PREDIABETES: ICD-10-CM

## 2020-02-19 DIAGNOSIS — Z12.4 SCREENING FOR MALIGNANT NEOPLASM OF CERVIX: ICD-10-CM

## 2020-02-19 DIAGNOSIS — M17.0 PRIMARY OSTEOARTHRITIS OF BOTH KNEES: ICD-10-CM

## 2020-02-19 PROCEDURE — 88142 CYTOPATH C/V THIN LAYER: CPT

## 2020-02-19 RX ORDER — METRONIDAZOLE 7.5 MG/G
1 GEL VAGINAL
Qty: 25 G | Refills: 4 | Status: SHIPPED | OUTPATIENT
Start: 2020-02-19 | End: 2020-02-24

## 2020-02-19 RX ORDER — IBUPROFEN 800 MG/1
800 TABLET ORAL
Qty: 90 TAB | Refills: 3 | Status: SHIPPED | OUTPATIENT
Start: 2020-02-19

## 2020-02-19 NOTE — ACP (ADVANCE CARE PLANNING)
Advance Care Planning (ACP) Provider Conversation Snapshot    Date of ACP Conversation: 02/19/20  Persons included in Conversation:  patient  Length of ACP Conversation in minutes:  <16 minutes (Non-Billable)    Authorized Decision Maker (if patient is incapable of making informed decisions): This person is:  Lourdes Garcia, kevin  Healthcare Agent/Medical Power of  under Advance Directive            For Patients with Decision Making Capacity:   Values/Goals: Exploration of values, goals, and preferences if recovery is not expected, even with continued medical treatment in the event of:  Imminent death  Severe, permanent brain injury  \"In these circumstances, what matters most to you? \"  Care focused more on comfort or quality of life.     Conversation Outcomes / Follow-Up Plan:   Recommended completion of Advance Directive form after review of ACP materials and conversation with prospective healthcare agent

## 2020-02-19 NOTE — PATIENT INSTRUCTIONS
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
## 2020-02-26 ENCOUNTER — HOSPITAL ENCOUNTER (OUTPATIENT)
Dept: LAB | Age: 60
Discharge: HOME | End: 2020-02-26
Payer: MEDICARE

## 2020-02-26 DIAGNOSIS — E03.9 ACQUIRED HYPOTHYROIDISM: ICD-10-CM

## 2020-02-26 DIAGNOSIS — R68.89 COLD FEELING: ICD-10-CM

## 2020-02-26 DIAGNOSIS — E53.8 VITAMIN B12 DEFICIENCY: ICD-10-CM

## 2020-02-26 DIAGNOSIS — E04.2 MULTIPLE THYROID NODULES: ICD-10-CM

## 2020-02-26 DIAGNOSIS — D51.8 OTHER VITAMIN B12 DEFICIENCY ANEMIA: ICD-10-CM

## 2020-02-26 DIAGNOSIS — R73.03 PREDIABETES: ICD-10-CM

## 2020-02-26 DIAGNOSIS — E55.9 VITAMIN D DEFICIENCY: ICD-10-CM

## 2020-02-26 DIAGNOSIS — G89.4 CHRONIC PAIN SYNDROME: ICD-10-CM

## 2020-02-26 DIAGNOSIS — E78.5 HYPERLIPIDEMIA WITH TARGET LDL LESS THAN 130: ICD-10-CM

## 2020-02-26 PROBLEM — G89.29 CHRONIC PAIN OF RIGHT KNEE: Status: RESOLVED | Noted: 2018-05-17 | Resolved: 2020-02-26

## 2020-02-26 PROBLEM — M25.562 CHRONIC PAIN OF LEFT KNEE: Status: RESOLVED | Noted: 2018-05-17 | Resolved: 2020-02-26

## 2020-02-26 PROBLEM — M25.561 CHRONIC PAIN OF RIGHT KNEE: Status: RESOLVED | Noted: 2018-05-17 | Resolved: 2020-02-26

## 2020-02-26 PROBLEM — G89.29 CHRONIC PAIN OF LEFT KNEE: Status: RESOLVED | Noted: 2018-05-17 | Resolved: 2020-02-26

## 2020-02-26 LAB
25(OH)D3 SERPL-MCNC: 52.8 NG/ML (ref 30–100)
ALBUMIN SERPL-MCNC: 3.8 G/DL (ref 3.4–5)
ALBUMIN/GLOB SERPL: 1.3 {RATIO} (ref 0.8–1.7)
ALP SERPL-CCNC: 73 U/L (ref 45–117)
ALT SERPL-CCNC: 23 U/L (ref 13–56)
AMPHET UR QL SCN: NEGATIVE
ANION GAP SERPL CALC-SCNC: 6 MMOL/L (ref 3–18)
AST SERPL-CCNC: 15 U/L (ref 10–38)
BARBITURATES UR QL SCN: NEGATIVE
BENZODIAZ UR QL: NEGATIVE
BILIRUB SERPL-MCNC: 0.3 MG/DL (ref 0.2–1)
BUN SERPL-MCNC: 20 MG/DL (ref 7–18)
BUN/CREAT SERPL: 23 (ref 12–20)
CALCIUM SERPL-MCNC: 8.6 MG/DL (ref 8.5–10.1)
CANNABINOIDS UR QL SCN: NEGATIVE
CHLORIDE SERPL-SCNC: 100 MMOL/L (ref 100–111)
CHOLEST SERPL-MCNC: 300 MG/DL
CO2 SERPL-SCNC: 28 MMOL/L (ref 21–32)
COCAINE UR QL SCN: NEGATIVE
CREAT SERPL-MCNC: 0.86 MG/DL (ref 0.6–1.3)
ERYTHROCYTE [DISTWIDTH] IN BLOOD BY AUTOMATED COUNT: 14.4 % (ref 11.6–14.5)
EST. AVERAGE GLUCOSE BLD GHB EST-MCNC: 114 MG/DL
GLOBULIN SER CALC-MCNC: 3 G/DL (ref 2–4)
GLUCOSE SERPL-MCNC: 89 MG/DL (ref 74–99)
HBA1C MFR BLD: 5.6 % (ref 4.2–5.6)
HCT VFR BLD AUTO: 43.7 % (ref 35–45)
HDLC SERPL-MCNC: 41 MG/DL (ref 40–60)
HDLC SERPL: 7.3 {RATIO} (ref 0–5)
HDSCOM,HDSCOM: NORMAL
HGB BLD-MCNC: 14.2 G/DL (ref 12–16)
LDLC SERPL CALC-MCNC: ABNORMAL MG/DL (ref 0–100)
LIPID PROFILE,FLP: ABNORMAL
MCH RBC QN AUTO: 29 PG (ref 24–34)
MCHC RBC AUTO-ENTMCNC: 32.5 G/DL (ref 31–37)
MCV RBC AUTO: 89.4 FL (ref 74–97)
METHADONE UR QL: NEGATIVE
OPIATES UR QL: NEGATIVE
PCP UR QL: NEGATIVE
PLATELET # BLD AUTO: 273 K/UL (ref 135–420)
PMV BLD AUTO: 10.5 FL (ref 9.2–11.8)
POTASSIUM SERPL-SCNC: 3.9 MMOL/L (ref 3.5–5.5)
PROT SERPL-MCNC: 6.8 G/DL (ref 6.4–8.2)
RBC # BLD AUTO: 4.89 M/UL (ref 4.2–5.3)
SODIUM SERPL-SCNC: 134 MMOL/L (ref 136–145)
T4 FREE SERPL-MCNC: 1.3 NG/DL (ref 0.7–1.5)
TRIGL SERPL-MCNC: 404 MG/DL (ref ?–150)
TSH SERPL DL<=0.05 MIU/L-ACNC: 1.37 UIU/ML (ref 0.36–3.74)
VIT B12 SERPL-MCNC: 394 PG/ML (ref 211–911)
VLDLC SERPL CALC-MCNC: ABNORMAL MG/DL
WBC # BLD AUTO: 7.3 K/UL (ref 4.6–13.2)

## 2020-02-26 PROCEDURE — 83036 HEMOGLOBIN GLYCOSYLATED A1C: CPT

## 2020-02-26 PROCEDURE — 80307 DRUG TEST PRSMV CHEM ANLYZR: CPT

## 2020-02-26 PROCEDURE — 84443 ASSAY THYROID STIM HORMONE: CPT

## 2020-02-26 PROCEDURE — 80061 LIPID PANEL: CPT

## 2020-02-26 PROCEDURE — 36415 COLL VENOUS BLD VENIPUNCTURE: CPT

## 2020-02-26 PROCEDURE — 85027 COMPLETE CBC AUTOMATED: CPT

## 2020-02-26 PROCEDURE — 80053 COMPREHEN METABOLIC PANEL: CPT

## 2020-02-26 PROCEDURE — 82306 VITAMIN D 25 HYDROXY: CPT

## 2020-02-26 PROCEDURE — 82607 VITAMIN B-12: CPT

## 2020-02-26 PROCEDURE — 84439 ASSAY OF FREE THYROXINE: CPT

## 2020-02-27 ENCOUNTER — HOSPITAL ENCOUNTER (OUTPATIENT)
Dept: NON INVASIVE DIAGNOSTICS | Age: 60
Discharge: HOME | End: 2020-02-27
Attending: INTERNAL MEDICINE
Payer: MEDICARE

## 2020-02-27 ENCOUNTER — HOSPITAL ENCOUNTER (OUTPATIENT)
Dept: MRI IMAGING | Age: 60
Discharge: HOME | End: 2020-02-27
Attending: INTERNAL MEDICINE
Payer: MEDICARE

## 2020-02-27 VITALS
SYSTOLIC BLOOD PRESSURE: 142 MMHG | BODY MASS INDEX: 39.68 KG/M2 | HEIGHT: 72 IN | DIASTOLIC BLOOD PRESSURE: 90 MMHG | WEIGHT: 293 LBS

## 2020-02-27 DIAGNOSIS — I71.21 ASCENDING AORTIC ANEURYSM: ICD-10-CM

## 2020-02-27 DIAGNOSIS — R00.0 TACHYCARDIA: ICD-10-CM

## 2020-02-27 LAB
ECHO AO ROOT DIAM: 4 CM
ECHO LA AREA 4C: 31.2 CM2
ECHO LA VOL 2C: 75.56 ML (ref 22–52)
ECHO LA VOL 4C: 119.63 ML (ref 22–52)
ECHO LA VOL BP: 104.44 ML (ref 22–52)
ECHO LA VOL/BSA BIPLANE: 36.87 ML/M2 (ref 16–28)
ECHO LA VOLUME INDEX A2C: 26.67 ML/M2 (ref 16–28)
ECHO LA VOLUME INDEX A4C: 42.23 ML/M2 (ref 16–28)
ECHO LV EDV TEICHHOLZ: 0.8 ML
ECHO LV ESV TEICHHOLZ: 0.33 ML
ECHO LV INTERNAL DIMENSION DIASTOLIC: 5.53 CM (ref 3.9–5.3)
ECHO LV INTERNAL DIMENSION SYSTOLIC: 3.79 CM
ECHO LV IVSD: 1.3 CM (ref 0.6–0.9)
ECHO LV MASS 2D: 360 G (ref 67–162)
ECHO LV MASS INDEX 2D: 127.1 G/M2 (ref 43–95)
ECHO LV POSTERIOR WALL DIASTOLIC: 1.26 CM (ref 0.6–0.9)
ECHO LVOT DIAM: 2.24 CM
ECHO LVOT PEAK GRADIENT: 2.6 MMHG
ECHO LVOT PEAK VELOCITY: 81.08 CM/S
ECHO LVOT VTI: 18.51 CM
ECHO MV A VELOCITY: 56.69 CM/S
ECHO MV E DECELERATION TIME (DT): 186 MS
ECHO MV E VELOCITY: 65.37 CM/S
ECHO MV E/A RATIO: 1.15
ECHO TV REGURGITANT MAX VELOCITY: 230.89 CM/S
ECHO TV REGURGITANT PEAK GRADIENT: 21.3 MMHG
LVFS 2D: 31.41 %
LVOT MG: 1.26 MMHG
LVOT MV: 0.52 CM/S
LVSV (TEICH): 29.29 ML
MV DEC SLOPE: 3.52

## 2020-02-27 PROCEDURE — 93306 TTE W/DOPPLER COMPLETE: CPT

## 2020-02-27 PROCEDURE — 71555 MRI ANGIO CHEST W OR W/O DYE: CPT

## 2020-03-02 ENCOUNTER — TELEPHONE (OUTPATIENT)
Dept: CARDIOLOGY CLINIC | Age: 60
End: 2020-03-02

## 2020-03-02 NOTE — TELEPHONE ENCOUNTER
----- Message from Claudia Pepe MD sent at 2/28/2020  4:33 PM EST -----  Please let patient know Lula Hua is stable, thx  ----- Message -----  From: Cecilio Damico Results In  Sent: 2/28/2020   4:07 PM EST  To: Claudia Pepe MD

## 2020-03-04 RX ORDER — ROSUVASTATIN CALCIUM 20 MG/1
20 TABLET, COATED ORAL
Qty: 90 TAB | Refills: 1 | Status: SHIPPED | OUTPATIENT
Start: 2020-03-04 | End: 2020-03-05

## 2020-03-04 NOTE — PROGRESS NOTES
Her cholesterol levels are very elevated, I recommend she start medication, a prescription for Crestor 20 mg 1 tablet by mouth once a day in the evening has been sent to her pharmacy  The rest of her labs are normal or within acceptable limits

## 2020-03-05 ENCOUNTER — TELEPHONE (OUTPATIENT)
Dept: FAMILY MEDICINE CLINIC | Age: 60
End: 2020-03-05

## 2020-03-05 ENCOUNTER — TELEPHONE (OUTPATIENT)
Dept: PRIMARY CARE CLINIC | Age: 60
End: 2020-03-05

## 2020-03-05 DIAGNOSIS — E78.5 HYPERLIPIDEMIA WITH TARGET LDL LESS THAN 130: Primary | ICD-10-CM

## 2020-03-05 RX ORDER — EZETIMIBE 10 MG/1
10 TABLET ORAL DAILY
Qty: 90 TAB | Refills: 1 | Status: SHIPPED | OUTPATIENT
Start: 2020-03-05 | End: 2021-02-03

## 2020-03-05 NOTE — TELEPHONE ENCOUNTER
Pt called back stating she has an allergy to statins and cannot take Crestor. I informed Yamilex Olmos and she states will change med. Fwded to NP [employer].  Thank you

## 2020-03-09 ENCOUNTER — OFFICE VISIT (OUTPATIENT)
Dept: CARDIOTHORACIC SURGERY | Age: 60
End: 2020-03-09

## 2020-03-09 VITALS — OXYGEN SATURATION: 99 % | DIASTOLIC BLOOD PRESSURE: 91 MMHG | HEART RATE: 66 BPM | SYSTOLIC BLOOD PRESSURE: 148 MMHG

## 2020-03-09 DIAGNOSIS — I71.21 ASCENDING AORTIC ANEURYSM: Primary | ICD-10-CM

## 2020-03-09 NOTE — PATIENT INSTRUCTIONS
1. Chest MRA 1 year, to check for growth. 2. Strict blood pressure control with goal of less than 120/80. 3. If experience severe sharp or tearing chest or back pain, be seen in an Emergency Room. 4. Do not push, lift or pull anything of extreme weight which causes bearing down or grunting. No power weight lifting. 5. Follow low salt, low fat diet. 6. Avoid smoking. 7. Moderate physical activity is OK. 8. All first degree relatives should be screened with a chest CT scan.
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
## 2020-05-28 ENCOUNTER — PATIENT MESSAGE (OUTPATIENT)
Dept: FAMILY MEDICINE CLINIC | Age: 60
End: 2020-05-28
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
## 2020-05-29 ENCOUNTER — OFFICE VISIT (OUTPATIENT)
Dept: FAMILY MEDICINE CLINIC | Age: 60
End: 2020-05-29

## 2020-05-29 DIAGNOSIS — S81.802A OPEN WOUND OF LEFT LOWER EXTREMITY, INITIAL ENCOUNTER: Primary | ICD-10-CM

## 2020-05-29 RX ORDER — BACITRACIN ZINC 500 UNIT/G
OINTMENT (GRAM) TOPICAL 2 TIMES DAILY
Qty: 15 G | Refills: 0 | Status: SHIPPED | OUTPATIENT
Start: 2020-05-29 | End: 2022-02-17

## 2020-05-29 RX ORDER — BACITRACIN ZINC 500 UNIT/G
OINTMENT (GRAM) TOPICAL 2 TIMES DAILY
Qty: 15 G | Refills: 0 | Status: SHIPPED | OUTPATIENT
Start: 2020-05-29 | End: 2020-05-29 | Stop reason: DRUGHIGH
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
## 2020-10-09 ENCOUNTER — OFFICE VISIT (OUTPATIENT)
Dept: FAMILY MEDICINE CLINIC | Age: 60
End: 2020-10-09
Payer: MEDICARE

## 2020-10-09 VITALS
RESPIRATION RATE: 18 BRPM | SYSTOLIC BLOOD PRESSURE: 136 MMHG | OXYGEN SATURATION: 97 % | HEIGHT: 72 IN | TEMPERATURE: 99.1 F | DIASTOLIC BLOOD PRESSURE: 84 MMHG | HEART RATE: 64 BPM | BODY MASS INDEX: 50.4 KG/M2

## 2020-10-09 DIAGNOSIS — M25.522 LEFT ELBOW PAIN: ICD-10-CM

## 2020-10-09 DIAGNOSIS — Z12.31 ENCOUNTER FOR SCREENING MAMMOGRAM FOR MALIGNANT NEOPLASM OF BREAST: ICD-10-CM

## 2020-10-09 DIAGNOSIS — E04.2 MULTIPLE THYROID NODULES: ICD-10-CM

## 2020-10-09 DIAGNOSIS — E55.9 VITAMIN D DEFICIENCY: ICD-10-CM

## 2020-10-09 DIAGNOSIS — F33.9 RECURRENT DEPRESSION (HCC): ICD-10-CM

## 2020-10-09 DIAGNOSIS — E28.2 PCOD (POLYCYSTIC OVARIAN DISEASE): ICD-10-CM

## 2020-10-09 DIAGNOSIS — Z13.820 OSTEOPOROSIS SCREENING: ICD-10-CM

## 2020-10-09 DIAGNOSIS — E78.5 HYPERLIPIDEMIA WITH TARGET LDL LESS THAN 130: Primary | ICD-10-CM

## 2020-10-09 DIAGNOSIS — E66.01 MORBID OBESITY (HCC): ICD-10-CM

## 2020-10-09 DIAGNOSIS — L85.3 DRY SKIN: ICD-10-CM

## 2020-10-09 DIAGNOSIS — L65.9 HAIR LOSS: ICD-10-CM

## 2020-10-09 DIAGNOSIS — R73.03 PREDIABETES: ICD-10-CM

## 2020-10-09 DIAGNOSIS — R60.0 LOCALIZED EDEMA: ICD-10-CM

## 2020-10-09 DIAGNOSIS — Z78.0 ASYMPTOMATIC MENOPAUSAL STATE: ICD-10-CM

## 2020-10-09 DIAGNOSIS — I71.21 ASCENDING AORTIC ANEURYSM: ICD-10-CM

## 2020-10-09 DIAGNOSIS — K76.0 FATTY LIVER: ICD-10-CM

## 2020-10-09 DIAGNOSIS — E53.8 VITAMIN B12 DEFICIENCY: ICD-10-CM

## 2020-10-09 DIAGNOSIS — L68.0 HIRSUTISM: ICD-10-CM

## 2020-10-09 PROCEDURE — G9717 DOC PT DX DEP/BP F/U NT REQ: HCPCS | Performed by: NURSE PRACTITIONER

## 2020-10-09 PROCEDURE — G8417 CALC BMI ABV UP PARAM F/U: HCPCS | Performed by: NURSE PRACTITIONER

## 2020-10-09 PROCEDURE — 99214 OFFICE O/P EST MOD 30 MIN: CPT | Performed by: NURSE PRACTITIONER

## 2020-10-09 PROCEDURE — G9899 SCRN MAM PERF RSLTS DOC: HCPCS | Performed by: NURSE PRACTITIONER

## 2020-10-09 PROCEDURE — G8427 DOCREV CUR MEDS BY ELIG CLIN: HCPCS | Performed by: NURSE PRACTITIONER

## 2020-10-09 PROCEDURE — G0463 HOSPITAL OUTPT CLINIC VISIT: HCPCS | Performed by: NURSE PRACTITIONER

## 2020-10-09 PROCEDURE — 3017F COLORECTAL CA SCREEN DOC REV: CPT | Performed by: NURSE PRACTITIONER

## 2020-10-09 NOTE — ASSESSMENT & PLAN NOTE
This condition is managed by Specialist.  Key Psychotherapeutic Meds             traZODone (DESYREL) 150 mg tablet (Taking) Take 1 Tab by mouth three (3) times daily. Other Key Behavioral Health Meds             oxyCODONE (OXYIR) 5 mg capsule (Taking) Take 5 mg by mouth two (2) times daily as needed.         Lab Results   Component Value Date/Time    Sodium 134 02/26/2020 03:04 PM    Creatinine 0.86 02/26/2020 03:04 PM    TSH 1.37 02/26/2020 03:04 PM    WBC 7.3 02/26/2020 03:04 PM    ALT (SGPT) 23 02/26/2020 03:04 PM
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
## 2020-10-09 NOTE — ASSESSMENT & PLAN NOTE
Uncontrolled, based on history, physical exam and review of pertinent labs, studies and medications; meds reconciled; continue current treatment plan, lifestyle modifications recommended. Key Obesity Meds             levothyroxine (SYNTHROID) 50 mcg tablet (Taking) 50 mcg Daily (before breakfast).         Lab Results   Component Value Date/Time    Hemoglobin A1c 5.6 02/26/2020 03:04 PM    Glucose 89 02/26/2020 03:04 PM    Cholesterol, total 300 02/26/2020 03:04 PM    HDL Cholesterol 41 02/26/2020 03:04 PM    LDL, calculated  02/26/2020 03:04 PM     LDL AND VLDL CHOLESTEROL NOT CALCULATED WHEN TRIGLYCERIDES >400 MG/DL OR HDL CHOLESTEROL <20 MG/DL    Triglyceride 404 02/26/2020 03:04 PM    TSH 1.37 02/26/2020 03:04 PM    Sodium 134 02/26/2020 03:04 PM    Potassium 3.9 02/26/2020 03:04 PM    ALT (SGPT) 23 02/26/2020 03:04 PM    Vitamin D 25-Hydroxy 52.8 02/26/2020 03:04 PM

## 2020-10-09 NOTE — ASSESSMENT & PLAN NOTE
This condition is managed by Specialist.  Key Peripheral Vascular Disease Meds             omega-3 fatty acids-vitamin e (FISH OIL) 1,000 mg Cap (Taking) Take 1 Cap by mouth daily. ezetimibe (ZETIA) 10 mg tablet Take 1 Tab by mouth daily.         Lab Results   Component Value Date/Time    WBC 7.3 02/26/2020 03:04 PM    HGB 14.2 02/26/2020 03:04 PM    HCT 43.7 02/26/2020 03:04 PM    PLATELET 597 59/11/8058 03:04 PM    Creatinine 0.86 02/26/2020 03:04 PM    BUN 20 02/26/2020 03:04 PM    Cholesterol, total 300 02/26/2020 03:04 PM    HDL Cholesterol 41 02/26/2020 03:04 PM    LDL, calculated  02/26/2020 03:04 PM     LDL AND VLDL CHOLESTEROL NOT CALCULATED WHEN TRIGLYCERIDES >400 MG/DL OR HDL CHOLESTEROL <20 MG/DL    Triglyceride 404 02/26/2020 03:04 PM

## 2020-10-09 NOTE — PROGRESS NOTES
[address] [place] [address] [place] [address]               [phone]      Luiz Evangelista is a 62 y.o. female and presents with Other (Power Bill form); Arm Pain (Left- Swimming and torn or pulled . Pain since April); and Other (Wants labs order. / Also wants Hormone levels checke d)       Assessment/Plan:    Diagnoses and all orders for this visit:    1. Hyperlipidemia with target LDL less than 130  -     LIPID PANEL; Future  Follow up lipid levels, have asked her to make sure this is done fasting  Her triglycerides and lipids have been elevated and so far she has been intolerant to statins and zetia    2. Vitamin B12 deficiency  -     VITAMIN B12; Future  F/u labs    3. Prediabetes  -     HEMOGLOBIN A1C WITH EAG; Future  F/u labs    4. Fatty liver  -     METABOLIC PANEL, COMPREHENSIVE; Future  F/u labs    5. Vitamin D deficiency  -     VITAMIN D, 25 HYDROXY; Future  F/u labs    6. Multiple thyroid nodules  -     TSH 3RD GENERATION; Future  -     T4, FREE; Future  -     T3 TOTAL; Future  F/u labs  At this time she is exhibiting several symptoms consistent with hypthyroidism    7. Hirsutism    8. Encounter for screening mammogram for malignant neoplasm of breast  -     FABRICE MAMMO BI SCREENING INCL CAD; Future  Health maintenance    9. Osteoporosis screening  -     DEXA BONE DENSITY STUDY AXIAL; Future  Health maintenance    10. PCOD (polycystic ovarian disease)    11. Left elbow pain  -     US EXT NONVAS LT COMP; Future  Possible tendon tear  Will get US    12. Dry skin  -     TSH 3RD GENERATION; Future  -     T4, FREE; Future  -     T3 TOTAL; Future    13. Hair loss  -     TSH 3RD GENERATION; Future  -     T4, FREE; Future  -     T3 TOTAL; Future  -     CBC W/O DIFF; Future    14. Localized edema  -     METABOLIC PANEL, COMPREHENSIVE; Future  -     TSH 3RD GENERATION; Future  -     T4, FREE; Future  -     T3 TOTAL; Future    15.  Asymptomatic menopausal state   -     DEXA BONE DENSITY STUDY AXIAL; Future  Health maintenance    16. Morbid obesity ([facility]  Assessment & Plan:  Uncontrolled, based on history, physical exam and review of pertinent labs, studies and medications; meds reconciled; continue current treatment plan, lifestyle modifications recommended. Key Obesity Meds             levothyroxine (SYNTHROID) 50 mcg tablet (Taking) 50 mcg Daily (before breakfast). Lab Results   Component Value Date/Time    Hemoglobin A1c 5.6 02/26/2020 03:04 PM    Glucose 89 02/26/2020 03:04 PM    Cholesterol, total 300 02/26/2020 03:04 PM    HDL Cholesterol 41 02/26/2020 03:04 PM    LDL, calculated  02/26/2020 03:04 PM     LDL AND VLDL CHOLESTEROL NOT CALCULATED WHEN TRIGLYCERIDES >400 MG/DL OR HDL CHOLESTEROL <20 MG/DL    Triglyceride 404 02/26/2020 03:04 PM    TSH 1.37 02/26/2020 03:04 PM    Sodium 134 02/26/2020 03:04 PM    Potassium 3.9 02/26/2020 03:04 PM    ALT (SGPT) 23 02/26/2020 03:04 PM    Vitamin D 25-Hydroxy 52.8 02/26/2020 03:04 PM         17. Ascending aortic aneurysm [facility])  Assessment & Plan: This condition is managed by Specialist.  Key Peripheral Vascular Disease Meds             omega-3 fatty acids-vitamin e (FISH OIL) 1,000 mg Cap (Taking) Take 1 Cap by mouth daily. ezetimibe (ZETIA) 10 mg tablet Take 1 Tab by mouth daily. Lab Results   Component Value Date/Time    WBC 7.3 02/26/2020 03:04 PM    HGB 14.2 02/26/2020 03:04 PM    HCT 43.7 02/26/2020 03:04 PM    PLATELET 616 52/09/9134 03:04 PM    Creatinine 0.86 02/26/2020 03:04 PM    BUN 20 02/26/2020 03:04 PM    Cholesterol, total 300 02/26/2020 03:04 PM    HDL Cholesterol 41 02/26/2020 03:04 PM    LDL, calculated  02/26/2020 03:04 PM     LDL AND VLDL CHOLESTEROL NOT CALCULATED WHEN TRIGLYCERIDES >400 MG/DL OR HDL CHOLESTEROL <20 MG/DL    Triglyceride 404 02/26/2020 03:04 PM         18. Recurrent depression ([facility]  Assessment & Plan:   This condition is managed by Specialist.  Key Psychotherapeutic Meds             traZODone (DESYREL) 150 mg tablet (Taking) Take 1 Tab by mouth three (3) times daily. Other Key Behavioral Health Meds             oxyCODONE (OXYIR) 5 mg capsule (Taking) Take 5 mg by mouth two (2) times daily as needed. Lab Results   Component Value Date/Time    Sodium 134 02/26/2020 03:04 PM    Creatinine 0.86 02/26/2020 03:04 PM    TSH 1.37 02/26/2020 03:04 PM    WBC 7.3 02/26/2020 03:04 PM    ALT (SGPT) 23 02/26/2020 03:04 PM           Follow-up and Dispositions    · Return in about 3 months (around 1/9/2021) for HLD, hypothyroid, hirtsuism , 30 min, office only. Subjective:    HLD:  Has been compliant with meds  states she tried zetia for about 4 days and it made her sick, she had flushing, hives, nausea, headache  Compliant with low-fat diet. most of the time    Denies myalgias or other side effects. N/A   states she will increase her fiber and start walking in the pool  Cholesterol, total   Date Value Ref Range Status   02/26/2020 300 (H) <200 MG/DL Final     Triglyceride   Date Value Ref Range Status   02/26/2020 404 (H) <150 MG/DL Final     Comment:     The drugs N-acetylcysteine (NAC) and  Metamiszole have been found to cause falsely  low results in this chemical assay. Please  be sure to submit blood samples obtained  BEFORE administration of either of these  drugs to assure correct results. HDL Cholesterol   Date Value Ref Range Status   02/26/2020 41 40 - 60 MG/DL Final     LDL, calculated   Date Value Ref Range Status   02/26/2020  0 - 100 MG/DL Final    LDL AND VLDL CHOLESTEROL NOT CALCULATED WHEN TRIGLYCERIDES >400 MG/DL OR HDL CHOLESTEROL <20 MG/DL   ]  Key Antihyperlipidemia Meds             omega-3 fatty acids-vitamin e (FISH OIL) 1,000 mg Cap (Taking) Take 1 Cap by mouth daily. ezetimibe (ZETIA) 10 mg tablet Take 1 Tab by mouth daily. Hypothyroidism  Patient complains of hypothyroidism.    Symptoms include weight changes, cold intolerance: especially in hands and feet, change in energy level, dry skin, hair loss, changes in taste preferences. Patient denies palpitations, nervousness, diarrhea. Onset of symptoms was problem is longstanding. Course to date has been gradually worsening. Endorses pain in lower legs when walking, denies hair loss. [place]  Has always had hair on her face  But now it has increased and spread, has noticed it on her chest and buttocks  PMH: PCOS    Left upper arm/ bicep pain  Onset: 4/2020  Location: left arm, pain is from elbow to mid upper arm, lateral side  Characteristics: limited ROM, pain is burning, hard to lift objects, cannot rotate hand, cannot lift arm overhead, this happened when she was swimming  PMH: never had this before  Treatments: stretches, heat, cold, motrin  Aggrivating: usage  Relieving: motrin, heat and cold: all help some but not completely       ROS:     ROS  As stated in HPI, otherwise all others negative. The problem list was updated as a part of today's visit.   Patient Active Problem List   Diagnosis Code    Morbid obesity ([facility] E66.01    Compulsive overeating F50.89    Heart murmur R01.1    Hyperlipidemia with target LDL less than 130 E78.5    Vitamin B12 deficiency E53.8    Complex tear of medial meniscus of right knee as current injury/ s/p surgery has chronic rt knee pain S83.231A    Chronic pain/ lower back pain/ bilateral knee G89.29    PCOD (polycystic ovarian disease) E28.2    Hirsutism L68.0    Ascending aortic aneurysm (HCC) I71.2    Prediabetes R73.03    IBS (irritable bowel syndrome) K58.9    Osteoarthritis of multiple joints M15.9    Multiple thyroid nodules E04.2    Fatty liver K76.0    Breast mass, right-Medially N63.10    Hiatal hernia K44.9    Bloating R14.0    Recurrent depression (HCC) F33.9    Lumbar spondylosis M47.816    Lumbar facet arthropathy M47.816    Lumbar degenerative disc disease M51.36    Primary osteoarthritis of both knees M17.0    Chronic venous insufficiency I87.2    Varicose veins of bilateral lower extremities with other complications M32.170       The PS,  were reviewed. SH:  Social History     Tobacco Use    Smoking status: Never Smoker    Smokeless tobacco: Never Used   Substance Use Topics    Alcohol use: Yes     Alcohol/week: 0.0 standard drinks     Comment: 1 glass wine monthly    Drug use: No       Medications/Allergies:  Current Outpatient Medications on File Prior to Visit   Medication Sig Dispense Refill    ubidecarenone/vitamin E mixed (COQ10  PO) Take  by mouth.  ibuprofen (MOTRIN) 800 mg tablet Take 1 Tab by mouth three (3) times daily (with meals). Indications: pain 90 Tab 3    MILK THISTLE PO Take  by mouth daily.  ketoconazole (NIZORAL) 2 % topical cream Apply  to affected area daily. Apply to affected area daily 15 g 0    oxyCODONE (OXYIR) 5 mg capsule Take 5 mg by mouth two (2) times daily as needed.  traZODone (DESYREL) 150 mg tablet Take 1 Tab by mouth three (3) times daily. (Patient taking differently: Take 150 mg by mouth two (2) times a day.) 90 Tab 1    omeprazole (PRILOSEC) 20 mg capsule as needed. Take 1 capsule by mouth twice daily       levothyroxine (SYNTHROID) 50 mcg tablet 50 mcg Daily (before breakfast).  psyllium (METAMUCIL) packet Take 1 Packet by mouth as needed.  TURMERIC, BULK, Take 40 mg by mouth daily.  cinnamon bark, bulk, powd Take 40 mg by mouth daily.  LORazepam (ATIVAN) 1 mg tablet Take 1 Tab by mouth daily as needed for Anxiety (Severe). (Patient taking differently: Take 1 mg by mouth nightly.) 30 Tab 0    Cholecalciferol, Vitamin D3, 50,000 unit cap Take  by mouth Every Mon, Wed & Sun.      VITAMIN B COMPLEX PO Take 1 Cap by mouth daily.  OTHER Ground seaweed - 1 tsp. Daily.  MAGNESIUM PO Take 400 mg by mouth daily.  omega-3 fatty acids-vitamin e (FISH OIL) 1,000 mg Cap Take 1 Cap by mouth daily.       flaxseed oil 1,000 mg Cap Take 1,000 mg by mouth daily.  UBIDECARENONE/VITAMIN E MIXED (COQ10  PO) Take 1 Tab by mouth daily.  vitamin e (AQUA GEMS) 200 unit capsule Take 200 Units by mouth daily.  ascorbic acid (VITAMIN C) 250 mg tablet Take 250 mg by mouth daily.  bacitracin zinc (BACITRACIN) ointment Apply  to affected area two (2) times a day. 15 g 0    ezetimibe (ZETIA) 10 mg tablet Take 1 Tab by mouth daily. 90 Tab 1     No current facility-administered medications on file prior to visit. Allergies   Allergen Reactions    Oxycodone Anaphylaxis and Hives     3/9/2020 Pt is taking medication currently    Adhesive Tape-Silicones Rash     Paper tape is okay to use.  Celebrex [Celecoxib] Hives    Contrast Dye [Iodine] Nausea Only     Abdominal pain, dizziness    Cortisone Nausea and Vomiting    Flexeril [Cyclobenzaprine] Nausea and Vomiting     Pt states also causes confusion    Keflex [Cephalexin] Diarrhea and Nausea Only     Joint pain    Midazolam Nausea Only     Dizziness    Statins-Hmg-Coa Reductase Inhibitors Unknown (comments)       Objective:  Visit Vitals  /84   Pulse 64   Temp 99.1 °F (37.3 °C)   Resp 18   Ht 6' 1\" (1.854 m)   SpO2 97%   BMI 50.40 kg/m²    Body mass index is 50.4 kg/m². Physical assessment  Physical Exam  Vitals signs and nursing note reviewed. Eyes:      Conjunctiva/sclera: Conjunctivae normal.      Pupils: Pupils are equal, round, and reactive to light. Neck:      Musculoskeletal: Normal range of motion. Vascular: No JVD. Cardiovascular:      Rate and Rhythm: Normal rate and regular rhythm. Pulses:           Radial pulses are 2+ on the right side and 2+ on the left side. Dorsalis pedis pulses are 1+ on the right side and 2+ on the left side. Heart sounds: Murmur present. No friction rub. No gallop.        Comments: 1+ pitting edema bilat pretibial, tender to palpation  Pulmonary:      Effort: Pulmonary effort is normal.      Breath sounds: Normal breath sounds. Musculoskeletal: Normal range of motion. Left upper arm: She exhibits tenderness. She exhibits no swelling, no edema and no deformity. Right lower le+ Pitting Edema present. Left lower le+ Pitting Edema present. Comments: Bicep squeeze test: partial rotation of hand with with pain to squeeze   Skin:     General: Skin is warm and dry. Neurological:      Mental Status: She is alert and oriented to person, place, and time.    Psychiatric:         Mood and Affect: Affect normal.         Cognition and Memory: Memory normal.         Judgment: Judgment normal.           Labwork and Ancillary Studies:    CBC w/Diff  Lab Results   Component Value Date/Time    WBC 7.3 2020 03:04 PM    HGB 14.2 2020 03:04 PM    PLATELET 574 10/74/6219 03:04 PM         Basic Metabolic Profile  Lab Results   Component Value Date/Time    Sodium 134 (L) 2020 03:04 PM    Potassium 3.9 2020 03:04 PM    Chloride 100 2020 03:04 PM    CO2 28 2020 03:04 PM    Anion gap 6 2020 03:04 PM    Glucose 89 2020 03:04 PM    BUN 20 (H) 2020 03:04 PM    Creatinine 0.86 2020 03:04 PM    BUN/Creatinine ratio 23 (H) 2020 03:04 PM    GFR est AA >60 2020 03:04 PM    GFR est non-AA >60 2020 03:04 PM    Calcium 8.6 2020 03:04 PM        Cholesterol  Lab Results   Component Value Date/Time    Cholesterol, total 300 (H) 2020 03:04 PM    HDL Cholesterol 41 2020 03:04 PM    LDL, calculated  2020 03:04 PM     LDL AND VLDL CHOLESTEROL NOT CALCULATED WHEN TRIGLYCERIDES >400 MG/DL OR HDL CHOLESTEROL <20 MG/DL    Triglyceride 404 (H) 2020 03:04 PM    CHOL/HDL Ratio 7.3 (H) 2020 03:04 PM       Health Maintenance:   Health Maintenance   Topic Date Due    Shingrix Vaccine Age 50> (1 of 2) 2011    DTaP/Tdap/Td series (2 - Td) 10/29/2020    Medicare Yearly Exam  2021    A1C test (Diabetic or Prediabetic)  02/26/2021    Breast Cancer Screen Mammogram  10/21/2021    Colonoscopy  11/15/2022    PAP AKA CERVICAL CYTOLOGY  02/19/2023    Lipid Screen  02/26/2025    Bone Densitometry (Dexa) Screening  07/28/2026    Hepatitis C Screening  Completed    Pneumococcal 0-64 years  Aged Out       I have discussed the diagnosis with the patient and the intended plan as seen in the above orders. The patient has received an After-Visit Summary and questions were answered concerning future plans. An After Visit Summary was printed and given to the patient. All diagnosis have been discussed with the patient and all of the patient's questions have been answered. Follow-up and Dispositions    · Return in about 3 months (around 1/9/2021) for HLD, hypothyroid, hirtsuism , 30 min, office only. Daniel Kenny, ISRAP-BC  [address] [address] [address] [facility] [address]  [id number]

## 2020-11-25 ENCOUNTER — HOSPITAL ENCOUNTER (OUTPATIENT)
Dept: ULTRASOUND IMAGING | Age: 60
Discharge: HOME | End: 2020-11-25
Attending: NURSE PRACTITIONER
Payer: MEDICARE

## 2020-11-25 DIAGNOSIS — M25.522 LEFT ELBOW PAIN: ICD-10-CM

## 2020-11-25 PROCEDURE — 76882 US LMTD JT/FCL EVL NVASC XTR: CPT

## 2020-12-02 ENCOUNTER — TRANSCRIBE ORDER (OUTPATIENT)
Dept: SCHEDULING | Age: 60
End: 2020-12-02

## 2020-12-02 DIAGNOSIS — G89.4 CHRONIC PAIN SYNDROME: Primary | ICD-10-CM

## 2020-12-11 ENCOUNTER — TELEPHONE (OUTPATIENT)
Dept: FAMILY MEDICINE CLINIC | Age: 60
End: 2020-12-11

## 2020-12-17 ENCOUNTER — HOSPITAL ENCOUNTER (OUTPATIENT)
Dept: BONE DENSITY | Age: 60
Discharge: HOME | End: 2020-12-17
Attending: NURSE PRACTITIONER
Payer: MEDICARE

## 2020-12-17 ENCOUNTER — HOSPITAL ENCOUNTER (OUTPATIENT)
Age: 60
Discharge: HOME | End: 2020-12-17
Attending: PHYSICAL MEDICINE & REHABILITATION
Payer: MEDICARE

## 2020-12-17 DIAGNOSIS — Z13.820 OSTEOPOROSIS SCREENING: ICD-10-CM

## 2020-12-17 DIAGNOSIS — G89.4 CHRONIC PAIN SYNDROME: ICD-10-CM

## 2020-12-17 DIAGNOSIS — Z78.0 ASYMPTOMATIC MENOPAUSAL STATE: ICD-10-CM

## 2020-12-17 PROCEDURE — 73221 MRI JOINT UPR EXTREM W/O DYE: CPT

## 2020-12-17 PROCEDURE — 77080 DXA BONE DENSITY AXIAL: CPT

## 2020-12-30 ENCOUNTER — TRANSCRIBE ORDER (OUTPATIENT)
Dept: REGISTRATION | Age: 60
End: 2020-12-30

## 2020-12-30 ENCOUNTER — HOSPITAL ENCOUNTER (OUTPATIENT)
Dept: LAB | Age: 60
Discharge: HOME | End: 2020-12-30
Payer: MEDICARE

## 2020-12-30 DIAGNOSIS — G89.4 CHRONIC PAIN SYNDROME: Primary | ICD-10-CM

## 2020-12-30 DIAGNOSIS — G89.4 CHRONIC PAIN SYNDROME: ICD-10-CM

## 2020-12-30 PROCEDURE — 80307 DRUG TEST PRSMV CHEM ANLYZR: CPT

## 2020-12-31 LAB
AMPHETAMINES UR QL SCN: NEGATIVE NG/ML
BARBITURATES UR QL SCN: NEGATIVE NG/ML
BENZODIAZ UR QL SCN: NEGATIVE NG/ML
BUPRENORPHINE UR QL: NEGATIVE NG/ML
BZE UR QL SCN: NEGATIVE NG/ML
CANNABINOIDS UR QL SCN: NEGATIVE NG/ML
CREAT UR-MCNC: 52.8 MG/DL (ref 20–300)
METHADONE UR QL SCN: NEGATIVE NG/ML
OPIATES UR QL SCN: NEGATIVE NG/ML
OXYCODONE+OXYMORPHONE UR QL SCN: NEGATIVE NG/ML
PCP UR QL: NEGATIVE NG/ML
PH UR: 6 [PH] (ref 4.5–8.9)
PLEASE NOTE:, 733163: NORMAL
PROPOXYPH UR QL SCN: NEGATIVE NG/ML

## 2021-01-11 ENCOUNTER — VIRTUAL VISIT (OUTPATIENT)
Dept: FAMILY MEDICINE CLINIC | Age: 61
End: 2021-01-11
Payer: MEDICARE

## 2021-01-11 DIAGNOSIS — G89.29 OTHER CHRONIC PAIN: ICD-10-CM

## 2021-01-11 DIAGNOSIS — S43.422S SPRAIN OF LEFT ROTATOR CUFF CAPSULE, SEQUELA: Primary | ICD-10-CM

## 2021-01-11 PROCEDURE — 99442 PR PHYS/QHP TELEPHONE EVALUATION 11-20 MIN: CPT | Performed by: NURSE PRACTITIONER

## 2021-01-11 NOTE — PROGRESS NOTES
1st attempt- no reply 0114      2nd attempt- no reply 0144          Chief Complaint   Patient presents with    Follow-up     Referral for Aqua [place]/Pool therapy - injured arm - dharmesh Sunman [phone]    Hypertension     worried - last week 145/90- Ascending Aneurysm     Other     wants to try VV maybe telephone only              1. Have you been to the ER, urgent care clinic since your last visit? Hospitalized since your last visit? No    2. Have you seen or consulted any other health care providers outside of the [address] [place] [address] since your last visit? Include any pap smears or colon screening. Cardiology, endocrinology    Chief Complaint   Patient presents with    Follow-up     Referral for [facility] [place]/Pool therapy - injured arm - dharmesh Sunman [phone]    Hypertension     worried - last week 145/90- Ascending Aneurysm      3 most recent PHQ Screens 1/11/2021   Little interest or pleasure in doing things Several days   Feeling down, depressed, irritable, or hopeless Several days   Total Score PHQ 2 2     Fall Risk Assessment, last 12 mths 1/11/2021   Able to walk? Yes   Fall in past 12 months? 0   Do you feel unsteady?  1   Are you worried about falling 1   Is the gait abnormal? 1   Number of falls in past 12 months 2   Fall with injury? -       [unfilled]   Learning Assessment 2/13/2020   PRIMARY LEARNER Patient   HIGHEST LEVEL OF EDUCATION - PRIMARY LEARNER  -   BARRIERS PRIMARY LEARNER -   CO-LEARNER CAREGIVER -   PRIMARY LANGUAGE ENGLISH   LEARNER PREFERENCE PRIMARY DEMONSTRATION   ANSWERED BY Patient   RELATIONSHIP SELF

## 2021-01-11 NOTE — PROGRESS NOTES
Sj Franco is a 62 y.o. female, evaluated via audio-only technology on 1/11/2021 for Follow-up (Referral for [facility] [place]/Pool therapy - injured arm - [facility] -[phone]), Hypertension (worried - last week 145/90- Ascending Aneurysm ), and Other (wants to try VV maybe telephone only )  . Assessment & Plan:   Diagnoses and all orders for this visit:    1. Sprain of left rotator cuff capsule, sequela  -     REFERRAL TO PHYSICAL THERAPY    2. Other chronic pain  -     REFERRAL TO PHYSICAL THERAPY    Had an initial injury to her left shoulder in April 2020, since then through ED evaluations it has been found the problem is in her rotator cuff with recommendations of physical therapy  Orders placed    Follow-up and Dispositions    · Return for ASAP, b/p check. 12  Subjective: Follow-up (Referral for [facility] [place]/Pool therapy - injured arm - dharmesh [place] [phone]), Hypertension (worried - last week 145/90- Ascending Aneurysm ), and Other (wants to try VV maybe telephone only )    High blood pressure  At pain management, b/p was 145/90    Left shoulder pain  Saw pain management doctor after our visit in 10/2020 he states it was most likely rotaror cuff  Had MRI ordered by her pain management doctor  He recommended OT  She called [facility] and was told she should have PT and they recommend aqua therapy due to her size and history of falls    She was seen in the office 10/2020:  Left upper arm/ bicep pain  Onset: 4/2020  Location: left arm, pain is from elbow to mid upper arm, lateral side  Characteristics: limited ROM, pain is burning, hard to lift objects, cannot rotate hand, cannot lift arm overhead, this happened when she was swimming  PMH: never had this before  Treatments: stretches, heat, cold, motrin  Aggrivating: usage  Relieving: motrin, heat and cold: all help some but not completely           Prior to Admission medications    Medication Sig Start Date End Date Taking? Authorizing Provider   bacitracin zinc (BACITRACIN) ointment Apply  to affected area two (2) times a day. 5/29/20  Yes Aramis Dominguez NP   ubidecarenone/vitamin E mixed (COQ10  PO) Take  by mouth. Yes Provider, Historical   ibuprofen (MOTRIN) 800 mg tablet Take 1 Tab by mouth three (3) times daily (with meals). Indications: pain 2/19/20  Yes Aramis Dominguez NP   MILK THISTLE PO Take  by mouth daily. Yes Provider, Historical   ketoconazole (NIZORAL) 2 % topical cream Apply  to affected area daily. Apply to affected area daily 1/21/19  Yes Susy Paulino NP   oxyCODONE (OXYIR) 5 mg capsule Take 5 mg by mouth two (2) times daily as needed. Yes Provider, Historical   traZODone (DESYREL) 150 mg tablet Take 1 Tab by mouth three (3) times daily. Patient taking differently: Take 150 mg by mouth two (2) times a day. 5/2/18  Yes Susy Paulino NP   omeprazole (PRILOSEC) 20 mg capsule as needed. Take 1 capsule by mouth twice daily    Yes Provider, Historical   levothyroxine (SYNTHROID) 50 mcg tablet 50 mcg Daily (before breakfast). 8/1/17  Yes Provider, Historical   psyllium (METAMUCIL) packet Take 1 Packet by mouth as needed. Yes Provider, Historical   TURMERIC, BULK, Take 40 mg by mouth daily. Yes Provider, Historical   cinnamon bark, bulk, powd Take 40 mg by mouth daily. Yes Provider, Historical   LORazepam (ATIVAN) 1 mg tablet Take 1 Tab by mouth daily as needed for Anxiety (Severe). Patient taking differently: Take 1 mg by mouth nightly. 2/23/17  Yes Susy Paulino NP   Cholecalciferol, Vitamin D3, 50,000 unit cap Take  by mouth Every Mon, Wed & Sun.   Yes Provider, Historical   VITAMIN B COMPLEX PO Take 1 Cap by mouth daily. Yes Provider, Historical   OTHER Ground seaweed - 1 tsp. Daily. Yes Provider, Historical   MAGNESIUM PO Take 400 mg by mouth daily. Yes Provider, Historical   omega-3 fatty acids-vitamin e (FISH OIL) 1,000 mg Cap Take 1 Cap by mouth daily.    Yes Provider, Historical   flaxseed oil 1,000 mg Cap Take 1,000 mg by mouth daily. Yes Provider, Historical   UBIDECARENONE/VITAMIN E MIXED (COQ10  PO) Take 1 Tab by mouth daily. Yes Provider, Historical   vitamin e (AQUA GEMS) 200 unit capsule Take 200 Units by mouth daily. Yes Provider, Historical   ascorbic acid (VITAMIN C) 250 mg tablet Take 250 mg by mouth daily. Yes Provider, Historical   ezetimibe (ZETIA) 10 mg tablet Take 1 Tab by mouth daily.  3/5/20   Harjit Connors NP     Patient Active Problem List   Diagnosis Code    Morbid obesity ([facility] E66.01    Compulsive overeating F50.89    Heart murmur R01.1    Hyperlipidemia with target LDL less than 130 E78.5    Vitamin B12 deficiency E53.8    Complex tear of medial meniscus of right knee as current injury/ s/p surgery has chronic rt knee pain S83.231A    Chronic pain/ lower back pain/ bilateral knee G89.29    PCOD (polycystic ovarian disease) E28.2    Hirsutism L68.0    Ascending aortic aneurysm (HCC) I71.2    Prediabetes R73.03    IBS (irritable bowel syndrome) K58.9    Osteoarthritis of multiple joints M15.9    Multiple thyroid nodules E04.2    Fatty liver K76.0    Breast mass, right-Medially N63.10    Hiatal hernia K44.9    Bloating R14.0    Recurrent depression (HCC) F33.9    Lumbar spondylosis M47.816    Lumbar facet arthropathy M47.816    Lumbar degenerative disc disease M51.36    Primary osteoarthritis of both knees M17.0    Chronic venous insufficiency I87.2    Varicose veins of bilateral lower extremities with other complications W28.970     Past Surgical History:   Procedure Laterality Date    COLONOSCOPY N/A 11/15/2017    COLONOSCOPY with polyp bx performed by Debra Valdes MD at [facility]    HX COLONOSCOPY  2008    due in 2018 -     [address]X [address]    HX HERNIA REPAIR  4846    Umbilical    HX KNEE ARTHROSCOPY Right 2013    HX PARTIAL THYROIDECTOMY Left right/ March 2017    HX PELVIC LAPAROSCOPY cervical growth - benign    HX TONSILLECTOMY  1976    LAPAROSCOPY ABDOMEN DIAGNOSTIC  1998    PCOS     Family History   Problem Relation Age of Onset    Stroke Mother         TIAs    Hypertension Mother     Dementia Mother     Heart Disease Father         MI    Diabetes Father     Cancer Paternal Uncle         Colon     Social History     Tobacco Use    Smoking status: Never Smoker    Smokeless tobacco: Never Used   Substance Use Topics    Alcohol use: Yes     Alcohol/week: 0.0 standard drinks     Comment: 1 glass wine monthly       ROS  As stated in HPI, otherwise all others negative. No flowsheet data found. [address], who was evaluated through a patient-initiated, synchronous (real-time) audio only encounter, and/or her healthcare decision maker, is aware that it is a billable service, with coverage as determined by her insurance carrier. She provided verbal consent to proceed: Yes. She has not had a related appointment within my department in the past 7 days or scheduled within the next 24 hours.       Total Time: minutes: 11-20 minutes    Nilsa Wan NP

## 2021-01-20 ENCOUNTER — HOSPITAL ENCOUNTER (OUTPATIENT)
Dept: PHYSICAL THERAPY | Age: 61
Discharge: HOME | End: 2021-01-20
Payer: MEDICARE

## 2021-01-20 PROCEDURE — 97163 PT EVAL HIGH COMPLEX 45 MIN: CPT

## 2021-01-20 PROCEDURE — 97140 MANUAL THERAPY 1/> REGIONS: CPT

## 2021-01-20 NOTE — PROGRESS NOTES
PHYSICAL THERAPY - DAILY TREATMENT NOTE    Patient Name: Shaun Meehan        Date: 2021  : 1960   YES Patient  Verified  Visit #:     Insurance: Payor: Star Dominguez / Plan: [place] MED[place] PART [place] & B / Product Type: Medicare /      In time: 12:20 Out time: 1:00   Total Treatment Time: 40     Medicare Time Tracking (below)   Total Timed Codes (min):  20 1:1 Treatment Time:  20     TREATMENT AREA =  Left shoulder pain [M25.512]    SUBJECTIVE  Pain Level (on 0 to 10 scale):    Medication Changes/New allergies or changes in medical history, any new surgeries or procedures? NO    If yes, update Summary List   Subjective Functional Status/Changes:  []  No changes reported     SEE IE          OBJECTIVE    15 min Manual Therapy: PROM Shoulder all planes   Rationale:      decrease pain, increase ROM and increase tissue extensibility to improve patient's ability to perform ADLs    5 min Self Care: Stool slide, scap squeeze, cane ER   Rationale:    increase ROM to improve the patients ability to perform ADLs      Billed With/As:   [] TE   [] TA   [] Neuro   [x] Self Care Patient Education: [x] Review HEP    [] Progressed/Changed HEP based on:   [] positioning   [] body mechanics   [] transfers   [] heat/ice application    [] other:       min Patient Education:  YES  Reviewed HEP   []  Progressed/Changed HEP based on:         Other Objective/Functional Measures:    SEE IE     Post Treatment Pain Level (on 0 to 10) scale:       ASSESSMENT  Assessment/Changes in Function:     SEE IE     []  See Progress Note/Recertification   Patient will continue to benefit from skilled PT services to modify and progress therapeutic interventions, address functional mobility deficits, address ROM deficits, address strength deficits, analyze and address soft tissue restrictions, analyze and cue movement patterns, analyze and modify body mechanics/ergonomics and assess and modify postural abnormalities to attain remaining goals.    Progress toward goals / Updated goals:         PLAN  []  Upgrade activities as tolerated YES Continue plan of care   []  Discharge due to :    []  Other:      Therapist: Rut Jorgensen, PT, OCS, SCS, CSCS    Date: 1/20/2021 Time: 1:05 PM       Future Appointments   Date Time Provider Siva Wright   2/11/2021  7:45 AM [facility] MRI RM 1 MMCRMRI [facility]   2/11/2021 10:00 AM [facility] RM 3 3D [facility]   2/11/2021 11:20 AM Raquel Haley MD [facility] BS AMB

## 2021-01-20 NOTE — PROGRESS NOTES
[facility], [address], [address] [place] [address] - Phone: [phone]  Fax: [phone] / [facility]  Patient Name: Vish Martinez : 1960   Medical   Diagnosis: Left shoulder pain [M25.512] Treatment Diagnosis: Left shoulder pain [M25.512]   Onset Date:      Referral Source: Geri Felder NP Start of Care [facility]): 2021   Prior Hospitalization: See medical history Provider #: [id number]   Prior Level of Function: Min pain with UE use   Comorbidities: Depression, OA, thyroid problem, HTN   Medications: Verified on Patient Summary List   The Plan of Care and following information is based on the information from the initial evaluation.   ===========================================================================================  Assessment / key information:  Vish Martinez is a 62 y.o.  yo female with Dx of Left shoulder pain [M25.512]. She reports injuring her L shoulder when she pulled up on a safety bar in her bathroom. Her MRI showed small RTC subscapularis tear and biceps tendon tear. She currently rates her pain as 9/10 at worst, 7/10 at best, primarily located at anterior aspect of her L shoulder. She complains of difficulty and increase pain with L UE use. Objective Findings:  Shoulder AROM: Flx: R = 170 deg, L = 65 deg, Scaption: R = 171 deg, L = 65 deg,   Ext: R = 50 deg, L = 14 deg,  ER: R = 37 deg, L = 17 deg,  IR behind the back: R = to L2 . L = to greater trochanter. Manual Muscle Testing:   Not performed at this time secondary to severe pain.   Pt instructed in HEP and will f/u in clinic for PT.  ===========================================================================================  Eval Complexity: History HIGH Complexity :3+ comorbidities / personal factors will impact the outcome/ POC ;  Examination  MEDIUM Complexity : 3 Standardized tests and measures addressing body structure, function, activity limitation and / or participation in recreation ; Presentation MEDIUM Complexity : Evolving with changing characteristics ; Decision Making MEDIUM Complexity : FOTO score of 26-74; Overall Complexity MEDIUM  Problem List: pain affecting function, decrease ROM, decrease strength, decrease ADL/ functional abilitiies, decrease activity tolerance and decrease flexibility/ joint mobility   Treatment Plan may include any combination of the following: Therapeutic exercise, Therapeutic activities, Neuromuscular re-education, Physical agent/modality, Manual therapy, Aquatic therapy, Patient education, Self Care training, Functional mobility training and Home safety training  Patient / Family readiness to learn indicated by: asking questions  Persons(s) to be included in education: patient (P)  Barriers to Learning/Limitations: None  Measures taken, if barriers to learning:    Patient Goal (s): Decrease pain    Patient self reported health status: fair  Rehabilitation Potential: poor   Short Term Goals: To be accomplished in  1-2  weeks:  1. Independent with HEP. 2. Decrease max pain 25-50% to assist with ADLs   Long Term Goals: To be accomplished in  3-4  weeks:  1. Decrease max pain 50-75% to assist with ADLs  2. Increase L shoulder PROM by 30 deg to assist with ADLs  3. Will rate  >/= +5 on Global Rating of Change and be prepared to DC to HEP. Frequency / Duration:   Patient to be seen  2-3  times per week for 3-4  weeks:  Patient / Caregiver education and instruction: self care and exercises    Therapist Signature: Kandice Vicente, SNEHA, OCS, SCS, CSCS Date: 4/79/7793   Certification Period: 1/20/21 - 4/17/21 Time: 1:06 PM   =================================================================================I certify that the above Physical Therapy Services are being furnished while the patient is under my care.   I agree with the treatment plan and certify that this therapy is necessary. Physician Signature:        Date:       Time:     Please sign and return to [facility] or you may fax the signed copy to [phone]. Thank you.

## 2021-01-22 ENCOUNTER — HOSPITAL ENCOUNTER (OUTPATIENT)
Dept: PHYSICAL THERAPY | Age: 61
End: 2021-01-22
Payer: MEDICARE

## 2021-01-27 ENCOUNTER — HOSPITAL ENCOUNTER (OUTPATIENT)
Dept: PHYSICAL THERAPY | Age: 61
Discharge: HOME | End: 2021-01-27
Payer: MEDICARE

## 2021-01-27 PROCEDURE — 97110 THERAPEUTIC EXERCISES: CPT

## 2021-01-27 PROCEDURE — 97140 MANUAL THERAPY 1/> REGIONS: CPT

## 2021-01-27 NOTE — PROGRESS NOTES
PHYSICAL THERAPY - DAILY TREATMENT NOTE    Patient Name: Bobo Lopez        Date: 2021  : 1960   YES Patient  Verified  Visit #:   2   of   12  Insurance: Payor: Paloma Doe / Plan: [place] MEDICARE PART A & B / Product Type: Medicare /      In time: 115 Out time: 205   Total Treatment Time: 40 (minus ten min for setup/removal of pillows/adjusting mat)     Medicare Time Tracking (below)   Total Timed Codes (min):  40 1:1 Treatment Time:  40     TREATMENT AREA =  Left shoulder pain [M25.512]    SUBJECTIVE  Pain Level (on 0 to 10 scale): 7/10   Medication Changes/New allergies or changes in medical history, any new surgeries or procedures? NO    If yes, update Summary List   Subjective Functional Status/Changes:  []  No changes reported   Pt restated information from eval. Starts Aquatic therapy next week. OBJECTIVE    25 min Manual Therapy: PROM Shoulder all planes; MFR pec girdle and L UT; grade II GH distraction and caudal glides; MFR L biceps and elbow flexion/ext and pron/supination PROM  All completed to tolerance   Rationale:      decrease pain, increase ROM and increase tissue extensibility to improve patient's ability to perform ADLs    15 min Therapeutic Exercise:  [x]  See flow sheet   Rationale:      increase ROM to improve the patients ability to      Billed With/As:   [x] TE   [] TA   [] Neuro   [] Self Care Patient Education: [x] Review HEP    [] Progressed/Changed HEP based on:   [] positioning   [] body mechanics   [] transfers   [] heat/ice application    [x] other:HEP reviewed        min Patient Education:  YES  Reviewed HEP   []  Progressed/Changed HEP based on: Other Objective/Functional Measures: All performed to tolerance     Post Treatment Pain Level (on 0 to 10) scale:  7/10     ASSESSMENT  Assessment/Changes in Function:   Pt to start AT next week. HEP administered and reviewed.      []  See Progress Note/Recertification   Patient will continue to benefit from skilled PT services to modify and progress therapeutic interventions, address functional mobility deficits, address ROM deficits, address strength deficits, analyze and address soft tissue restrictions, analyze and cue movement patterns, analyze and modify body mechanics/ergonomics and assess and modify postural abnormalities to attain remaining goals.    Progress toward goals / Updated goals:  No progress towards goals this visit       PLAN  [x]  Upgrade activities as tolerated YES Continue plan of care   []  Discharge due to :    []  Other:      Therapist: Nelly Uriostegui, PT, DPT    Date: 1/27/2021 Time: 1:05 PM       Future Appointments   Date Time Provider Siva Wright   2/2/2021  4:15 PM Leanora Lawrence [address] [facility]   2/4/2021  4:15 PM Alferd Nicholas,  [address] [facility]   2/9/2021  3:30 PM Alferd Nicholas,  [address] [facility]   2/10/2021  2:00 PM Leanora Lawrence [address]   2/11/2021  7:45 AM [facility] MRI RM 1 MMCRMRI [facility]   2/11/2021 10:00 AM [facility] RM 3 3D HBV[facility]   2/11/2021 11:20 AM Jerry Haley MD [facility] BS AMB   2/15/2021 11:30 AM Carlos Alcantara,  [address] [facility]   2/17/2021  2:45 PM Alferd Nicholas,  [address] [facility]   2/22/2021  2:45 PM Alferd Nicholas,  [address] [facility]   2/24/2021  2:45 PM Alferd Nicholas,  [address] [facility]   3/1/2021  1:15 PM Alferd Nicholas,  [address] [facility]   3/3/2021  2:00 PM Leanora Lawrence [address] [facility]   3/8/2021  1:15 PM Cristina Moreira  [address] [facility]   3/10/2021  2:00 PM Leanora Lawrencebrittney [address]

## 2021-01-28 ENCOUNTER — APPOINTMENT (OUTPATIENT)
Dept: PHYSICAL THERAPY | Age: 61
End: 2021-01-28
Payer: MEDICARE

## 2021-02-02 ENCOUNTER — HOSPITAL ENCOUNTER (OUTPATIENT)
Dept: PHYSICAL THERAPY | Age: 61
End: 2021-02-02

## 2021-02-03 ENCOUNTER — TELEPHONE (OUTPATIENT)
Dept: FAMILY MEDICINE CLINIC | Age: 61
End: 2021-02-03

## 2021-02-03 ENCOUNTER — APPOINTMENT (OUTPATIENT)
Dept: GENERAL RADIOLOGY | Age: 61
End: 2021-02-03
Attending: EMERGENCY MEDICINE
Payer: MEDICARE

## 2021-02-03 ENCOUNTER — HOSPITAL ENCOUNTER (EMERGENCY)
Age: 61
Discharge: HOME | End: 2021-02-03
Attending: EMERGENCY MEDICINE
Payer: MEDICARE

## 2021-02-03 VITALS
BODY MASS INDEX: 39.68 KG/M2 | HEART RATE: 70 BPM | RESPIRATION RATE: 18 BRPM | SYSTOLIC BLOOD PRESSURE: 164 MMHG | TEMPERATURE: 98.1 F | DIASTOLIC BLOOD PRESSURE: 89 MMHG | WEIGHT: 293 LBS | HEIGHT: 72 IN | OXYGEN SATURATION: 94 %

## 2021-02-03 DIAGNOSIS — S43.422S SPRAIN OF LEFT ROTATOR CUFF CAPSULE, SEQUELA: Primary | ICD-10-CM

## 2021-02-03 DIAGNOSIS — S83.92XA SPRAIN OF LEFT KNEE, UNSPECIFIED LIGAMENT, INITIAL ENCOUNTER: ICD-10-CM

## 2021-02-03 DIAGNOSIS — S89.92XA INJURY OF LEFT KNEE, INITIAL ENCOUNTER: ICD-10-CM

## 2021-02-03 DIAGNOSIS — S93.402A SPRAIN OF LEFT ANKLE, UNSPECIFIED LIGAMENT, INITIAL ENCOUNTER: Primary | ICD-10-CM

## 2021-02-03 LAB
APPEARANCE UR: CLEAR
BILIRUB UR QL: NEGATIVE
COLOR UR: YELLOW
GLUCOSE UR STRIP.AUTO-MCNC: NEGATIVE MG/DL
HGB UR QL STRIP: NEGATIVE
KETONES UR QL STRIP.AUTO: NEGATIVE MG/DL
LEUKOCYTE ESTERASE UR QL STRIP.AUTO: NEGATIVE
NITRITE UR QL STRIP.AUTO: NEGATIVE
PH UR STRIP: 5.5 [PH] (ref 5–8)
PROT UR STRIP-MCNC: NEGATIVE MG/DL
SP GR UR REFRACTOMETRY: 1.02 (ref 1–1.03)
UROBILINOGEN UR QL STRIP.AUTO: 0.2 EU/DL (ref 0.2–1)

## 2021-02-03 PROCEDURE — 99283 EMERGENCY DEPT VISIT LOW MDM: CPT

## 2021-02-03 PROCEDURE — 81003 URINALYSIS AUTO W/O SCOPE: CPT

## 2021-02-03 PROCEDURE — 73030 X-RAY EXAM OF SHOULDER: CPT

## 2021-02-03 PROCEDURE — 73564 X-RAY EXAM KNEE 4 OR MORE: CPT

## 2021-02-03 PROCEDURE — 74011250637 HC RX REV CODE- 250/637: Performed by: EMERGENCY MEDICINE

## 2021-02-03 PROCEDURE — 73610 X-RAY EXAM OF ANKLE: CPT

## 2021-02-03 RX ORDER — IBUPROFEN 600 MG/1
600 TABLET ORAL
Status: COMPLETED | OUTPATIENT
Start: 2021-02-03 | End: 2021-02-03

## 2021-02-03 RX ORDER — ACETAMINOPHEN 500 MG
1000 TABLET ORAL
Status: DISCONTINUED | OUTPATIENT
Start: 2021-02-03 | End: 2021-02-03 | Stop reason: HOSPADM

## 2021-02-03 RX ADMIN — IBUPROFEN 600 MG: 600 TABLET, FILM COATED ORAL at 14:41

## 2021-02-03 NOTE — TELEPHONE ENCOUNTER
Received a call from physical therapist Lachelle Edwards stating patient Deepali Breen had a incident at the pool yesterday. Lachelle Edwards will be out of the office the rest of the day so he is requesting a call back tomorrow at [phone].
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
## 2021-02-03 NOTE — ROUTINE PROCESS
Vish Martinez is a 62 y.o. female that was discharged in stable. Pt was accompanied by friend. Pt is not driving. The patients diagnosis, condition and treatment were explained to  patient and aftercare instructions were given. The patient verbalized understanding. Patient armband removed and shredded.

## 2021-02-03 NOTE — ED TRIAGE NOTES
Patient c/o slip and fall yesterday at therapy pool. She states striking head onto stairs to therapy pool. C/o pain to bilateral shoulders, left knee, left ankle, neck and lower back. She states pre-existing left rotator cuff tear.

## 2021-02-04 ENCOUNTER — APPOINTMENT (OUTPATIENT)
Dept: PHYSICAL THERAPY | Age: 61
End: 2021-02-04

## 2021-02-04 ENCOUNTER — TRANSCRIBE ORDER (OUTPATIENT)
Dept: SCHEDULING | Age: 61
End: 2021-02-04

## 2021-02-04 DIAGNOSIS — S93.402A SPRAIN OF LEFT ANKLE, INITIAL ENCOUNTER: Primary | ICD-10-CM

## 2021-02-04 NOTE — TELEPHONE ENCOUNTER
Received a follow up call from Charlotte Soriano a physical therapist from [facility] in regards to patient [address]. Charlotte Soriano is requesting a return call back at [phone]  he will be available until 1pm today.

## 2021-02-05 NOTE — TELEPHONE ENCOUNTER
1226:  Called mrs suazo to discuss her injuries  No answer. [facility]  1313: she called back  She has ankle MRI tomorrow  Sees surgeon about shoulder on Tuesday  She needs to establish with another provider for her ankle and for her knee, she has not set up the appointments  Wearing an ankle splint, states it is too tight and cutting off the circulation, informed her to take off the splint and possible wrap with ace wrap  She was very upset and tearful during the visit, she is scared due to her size and immobility  She lives in [facility]: Called mrs suazo to f/u on her MRI order and need for personal care at home  No answer, [facility]. Ms. Lei Guevara called me back, I informed her I cannot MRI of her knee to be done at the same time as the MRI of her ankle. Therefore since I am not certain on how to order the exact MRI she would need for her knee I will not order this test.  Informed her she should follow-up with the doctors for her knee and ankle and they can decide for any further testing needed at that time. I also informed her that [facility] does not provide personal care, gave her the names of 2 home health agencies that may provide the services she desires.   She verbalized understanding microphone off

## 2021-02-05 NOTE — TELEPHONE ENCOUNTER
Returned call to Vinnie at [facility]  He called to inform me of incident at [place]. On 2/2/21  She feel into the pool. She was going down the stairs and slipped on the first step and slipped causing her to fall backwards. She refused to let them call EMS, took them almost 2 hours to get her out of the pool. She did not want to go to hospital on this side, only wanted hospital in [place] because they treat bariatric patients. She exited the pool, walking forward up the steps, she chose to walk to the lobby bathroom instead of the nearby bathroom by the pool. The therapist accompanied her. She did state she was in pain but did walk to her car and drove her self out. Per chart review she went to [facility] ED on 2/3/21. Imaging: Rt shoulder: No acute fracture or dislocation. Subacromial space is adequate. Acromioclavicular joint is intact. Left shoulder:  No acute fracture or dislocation.   :    No fracture. Rotator cuff tear possible. Left ankle: 1. Subtle stress fracture in the calcaneus, posterior plantar aspect possible. 2. Medial soft tissue swelling. Widening of the lateral tibial talar joint   space, ligamentous injury suspected. No fracture. Left knee: Severe degenerative joint disease, worse in the lateral and patellofemoral   compartments. No acute fracture or dislocation. No joint effusion. Potential   intra-articular loose body laterally. No bony erosion. According to Vinnie: per the patient she  Fractured her ankle, hurt her knee, hurt her shoulder and has sever back pain. States she has a note from a doctor to continue therapy in a week or two. In my opinion she should not return to PT until all the injuries have been evaluated by an orthopedicst. She has appt on 2/9/21 with Dr. Carolynn Jackson for her shoulder. If he addresses the other injuries that will be sufficient.  However if he only addres the shoulder she will need to see the recommended orthopedic surgeon from the ED.  If she gets clearance from them she may return to PT.

## 2021-02-06 ENCOUNTER — HOSPITAL ENCOUNTER (OUTPATIENT)
Age: 61
Discharge: HOME | End: 2021-02-06
Attending: EMERGENCY MEDICINE
Payer: MEDICARE

## 2021-02-06 DIAGNOSIS — S93.402A SPRAIN OF LEFT ANKLE, UNSPECIFIED LIGAMENT, INITIAL ENCOUNTER: ICD-10-CM

## 2021-02-06 PROCEDURE — 73721 MRI JNT OF LWR EXTRE W/O DYE: CPT

## 2021-02-09 ENCOUNTER — OFFICE VISIT (OUTPATIENT)
Dept: ORTHOPEDIC SURGERY | Age: 61
End: 2021-02-09
Payer: MEDICARE

## 2021-02-09 ENCOUNTER — APPOINTMENT (OUTPATIENT)
Dept: PHYSICAL THERAPY | Age: 61
End: 2021-02-09

## 2021-02-09 VITALS
TEMPERATURE: 97.7 F | OXYGEN SATURATION: 95 % | HEART RATE: 68 BPM | SYSTOLIC BLOOD PRESSURE: 145 MMHG | DIASTOLIC BLOOD PRESSURE: 79 MMHG | HEIGHT: 72 IN | RESPIRATION RATE: 16 BRPM | BODY MASS INDEX: 52.77 KG/M2

## 2021-02-09 DIAGNOSIS — M75.112 INCOMPLETE TEAR OF LEFT ROTATOR CUFF, UNSPECIFIED WHETHER TRAUMATIC: ICD-10-CM

## 2021-02-09 DIAGNOSIS — M75.02 ADHESIVE CAPSULITIS OF LEFT SHOULDER: Primary | ICD-10-CM

## 2021-02-09 PROCEDURE — G9899 SCRN MAM PERF RSLTS DOC: HCPCS | Performed by: ORTHOPAEDIC SURGERY

## 2021-02-09 PROCEDURE — G9717 DOC PT DX DEP/BP F/U NT REQ: HCPCS | Performed by: ORTHOPAEDIC SURGERY

## 2021-02-09 PROCEDURE — 3017F COLORECTAL CA SCREEN DOC REV: CPT | Performed by: ORTHOPAEDIC SURGERY

## 2021-02-09 PROCEDURE — G8417 CALC BMI ABV UP PARAM F/U: HCPCS | Performed by: ORTHOPAEDIC SURGERY

## 2021-02-09 PROCEDURE — 99214 OFFICE O/P EST MOD 30 MIN: CPT | Performed by: ORTHOPAEDIC SURGERY

## 2021-02-09 PROCEDURE — G8427 DOCREV CUR MEDS BY ELIG CLIN: HCPCS | Performed by: ORTHOPAEDIC SURGERY
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
## 2021-02-10 ENCOUNTER — OFFICE VISIT (OUTPATIENT)
Dept: ORTHOPEDIC SURGERY | Age: 61
End: 2021-02-10
Payer: MEDICARE

## 2021-02-10 ENCOUNTER — APPOINTMENT (OUTPATIENT)
Dept: PHYSICAL THERAPY | Age: 61
End: 2021-02-10

## 2021-02-10 VITALS
TEMPERATURE: 97 F | HEIGHT: 72 IN | RESPIRATION RATE: 16 BRPM | DIASTOLIC BLOOD PRESSURE: 78 MMHG | BODY MASS INDEX: 52.77 KG/M2 | OXYGEN SATURATION: 97 % | HEART RATE: 60 BPM | SYSTOLIC BLOOD PRESSURE: 139 MMHG

## 2021-02-10 DIAGNOSIS — S93.492A SPRAIN OF ANTERIOR TALOFIBULAR LIGAMENT OF LEFT ANKLE, INITIAL ENCOUNTER: Primary | ICD-10-CM

## 2021-02-10 DIAGNOSIS — M25.572 ACUTE LEFT ANKLE PAIN: ICD-10-CM

## 2021-02-10 PROCEDURE — 3017F COLORECTAL CA SCREEN DOC REV: CPT | Performed by: ORTHOPAEDIC SURGERY

## 2021-02-10 PROCEDURE — G9899 SCRN MAM PERF RSLTS DOC: HCPCS | Performed by: ORTHOPAEDIC SURGERY

## 2021-02-10 PROCEDURE — G8427 DOCREV CUR MEDS BY ELIG CLIN: HCPCS | Performed by: ORTHOPAEDIC SURGERY

## 2021-02-10 PROCEDURE — G8417 CALC BMI ABV UP PARAM F/U: HCPCS | Performed by: ORTHOPAEDIC SURGERY

## 2021-02-10 PROCEDURE — G9717 DOC PT DX DEP/BP F/U NT REQ: HCPCS | Performed by: ORTHOPAEDIC SURGERY

## 2021-02-10 PROCEDURE — 99214 OFFICE O/P EST MOD 30 MIN: CPT | Performed by: ORTHOPAEDIC SURGERY
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
## 2021-02-12 ENCOUNTER — TELEPHONE (OUTPATIENT)
Dept: ORTHOPEDIC SURGERY | Age: 61
End: 2021-02-12

## 2021-02-12 ENCOUNTER — HOME HEALTH ADMISSION (OUTPATIENT)
Dept: HOME HEALTH SERVICES | Facility: HOME HEALTH | Age: 61
End: 2021-02-12
Payer: MEDICARE

## 2021-02-12 DIAGNOSIS — S93.492A SPRAIN OF ANTERIOR TALOFIBULAR LIGAMENT OF LEFT ANKLE, INITIAL ENCOUNTER: Primary | ICD-10-CM

## 2021-02-12 DIAGNOSIS — M75.02 ADHESIVE CAPSULITIS OF LEFT SHOULDER: Primary | ICD-10-CM

## 2021-02-12 DIAGNOSIS — M75.112 INCOMPLETE TEAR OF LEFT ROTATOR CUFF, UNSPECIFIED WHETHER TRAUMATIC: ICD-10-CM

## 2021-02-12 NOTE — TELEPHONE ENCOUNTER
Misael to provide order for home PT as requested. Leny Su [facility], MARY, PA-C  2/12/2021   2:38 PM

## 2021-02-12 NOTE — TELEPHONE ENCOUNTER
LMOVM notifying patient that Roxanna Kinsey and ALECIA Magana are out of the office until Monday 2-.

## 2021-02-12 NOTE — TELEPHONE ENCOUNTER
Pt is requesting in home physical therapy for her shoulder due to her obesity issues.      She can be reached at D#[phone]

## 2021-02-12 NOTE — TELEPHONE ENCOUNTER
Pt is requesting in home physical therapy for her ankle/foot due to her obesity issues.      She can be reached at Z#[phone]

## 2021-02-14 ENCOUNTER — HOME CARE VISIT (OUTPATIENT)
Dept: SCHEDULING | Facility: HOME HEALTH | Age: 61
End: 2021-02-14
Payer: MEDICARE

## 2021-02-14 VITALS
DIASTOLIC BLOOD PRESSURE: 100 MMHG | OXYGEN SATURATION: 94 % | HEART RATE: 59 BPM | RESPIRATION RATE: 17 BRPM | TEMPERATURE: 98.4 F | SYSTOLIC BLOOD PRESSURE: 162 MMHG

## 2021-02-14 PROCEDURE — 400018 HH-NO PAY CLAIM PROCEDURE

## 2021-02-14 PROCEDURE — 400013 HH SOC

## 2021-02-14 PROCEDURE — 3331090001 HH PPS REVENUE CREDIT

## 2021-02-14 PROCEDURE — 3331090002 HH PPS REVENUE DEBIT

## 2021-02-14 PROCEDURE — G0151 HHCP-SERV OF PT,EA 15 MIN: HCPCS

## 2021-02-15 ENCOUNTER — APPOINTMENT (OUTPATIENT)
Dept: PHYSICAL THERAPY | Age: 61
End: 2021-02-15

## 2021-02-15 PROCEDURE — 3331090001 HH PPS REVENUE CREDIT

## 2021-02-15 PROCEDURE — 3331090002 HH PPS REVENUE DEBIT

## 2021-02-15 NOTE — TELEPHONE ENCOUNTER
Please place home health referral for PT and OT.  We cannot guarantee however that they will deem that she qualifies for these services

## 2021-02-16 ENCOUNTER — HOME CARE VISIT (OUTPATIENT)
Dept: HOME HEALTH SERVICES | Facility: HOME HEALTH | Age: 61
End: 2021-02-16
Payer: MEDICARE

## 2021-02-16 PROCEDURE — 3331090002 HH PPS REVENUE DEBIT

## 2021-02-16 PROCEDURE — 3331090001 HH PPS REVENUE CREDIT

## 2021-02-17 ENCOUNTER — APPOINTMENT (OUTPATIENT)
Dept: PHYSICAL THERAPY | Age: 61
End: 2021-02-17

## 2021-02-17 ENCOUNTER — HOME CARE VISIT (OUTPATIENT)
Dept: HOME HEALTH SERVICES | Facility: HOME HEALTH | Age: 61
End: 2021-02-17
Payer: MEDICARE

## 2021-02-17 PROCEDURE — 3331090001 HH PPS REVENUE CREDIT

## 2021-02-17 PROCEDURE — 3331090002 HH PPS REVENUE DEBIT

## 2021-02-18 ENCOUNTER — HOME CARE VISIT (OUTPATIENT)
Dept: SCHEDULING | Facility: HOME HEALTH | Age: 61
End: 2021-02-18
Payer: MEDICARE

## 2021-02-18 PROCEDURE — 3331090002 HH PPS REVENUE DEBIT

## 2021-02-18 PROCEDURE — G0152 HHCP-SERV OF OT,EA 15 MIN: HCPCS

## 2021-02-18 PROCEDURE — 3331090001 HH PPS REVENUE CREDIT

## 2021-02-19 ENCOUNTER — HOME CARE VISIT (OUTPATIENT)
Dept: HOME HEALTH SERVICES | Facility: HOME HEALTH | Age: 61
End: 2021-02-19
Payer: MEDICARE

## 2021-02-19 VITALS
SYSTOLIC BLOOD PRESSURE: 140 MMHG | DIASTOLIC BLOOD PRESSURE: 76 MMHG | RESPIRATION RATE: 17 BRPM | HEART RATE: 60 BPM | TEMPERATURE: 98.9 F | OXYGEN SATURATION: 95 %

## 2021-02-19 PROCEDURE — 3331090001 HH PPS REVENUE CREDIT

## 2021-02-19 PROCEDURE — 3331090002 HH PPS REVENUE DEBIT

## 2021-02-20 PROCEDURE — 3331090002 HH PPS REVENUE DEBIT

## 2021-02-20 PROCEDURE — 3331090001 HH PPS REVENUE CREDIT

## 2021-02-21 PROCEDURE — 3331090001 HH PPS REVENUE CREDIT

## 2021-02-21 PROCEDURE — 3331090002 HH PPS REVENUE DEBIT

## 2021-02-22 ENCOUNTER — APPOINTMENT (OUTPATIENT)
Dept: PHYSICAL THERAPY | Age: 61
End: 2021-02-22

## 2021-02-22 PROCEDURE — 3331090002 HH PPS REVENUE DEBIT

## 2021-02-22 PROCEDURE — 3331090001 HH PPS REVENUE CREDIT

## 2021-02-23 ENCOUNTER — HOME CARE VISIT (OUTPATIENT)
Dept: SCHEDULING | Facility: HOME HEALTH | Age: 61
End: 2021-02-23
Payer: MEDICARE

## 2021-02-23 PROCEDURE — 3331090001 HH PPS REVENUE CREDIT

## 2021-02-23 PROCEDURE — G0156 HHCP-SVS OF AIDE,EA 15 MIN: HCPCS

## 2021-02-23 PROCEDURE — 3331090002 HH PPS REVENUE DEBIT

## 2021-02-23 PROCEDURE — G0157 HHC PT ASSISTANT EA 15: HCPCS

## 2021-02-24 ENCOUNTER — APPOINTMENT (OUTPATIENT)
Dept: PHYSICAL THERAPY | Age: 61
End: 2021-02-24

## 2021-02-24 ENCOUNTER — HOME CARE VISIT (OUTPATIENT)
Dept: SCHEDULING | Facility: HOME HEALTH | Age: 61
End: 2021-02-24
Payer: MEDICARE

## 2021-02-24 VITALS
OXYGEN SATURATION: 95 % | HEART RATE: 72 BPM | DIASTOLIC BLOOD PRESSURE: 80 MMHG | SYSTOLIC BLOOD PRESSURE: 145 MMHG | TEMPERATURE: 98.2 F

## 2021-02-24 PROCEDURE — 3331090001 HH PPS REVENUE CREDIT

## 2021-02-24 PROCEDURE — G0158 HHC OT ASSISTANT EA 15: HCPCS

## 2021-02-24 PROCEDURE — 3331090002 HH PPS REVENUE DEBIT

## 2021-02-25 ENCOUNTER — HOME CARE VISIT (OUTPATIENT)
Dept: SCHEDULING | Facility: HOME HEALTH | Age: 61
End: 2021-02-25
Payer: MEDICARE

## 2021-02-25 VITALS
RESPIRATION RATE: 18 BRPM | TEMPERATURE: 98.5 F | SYSTOLIC BLOOD PRESSURE: 122 MMHG | OXYGEN SATURATION: 93 % | HEART RATE: 65 BPM | DIASTOLIC BLOOD PRESSURE: 80 MMHG

## 2021-02-25 VITALS
DIASTOLIC BLOOD PRESSURE: 86 MMHG | SYSTOLIC BLOOD PRESSURE: 134 MMHG | HEART RATE: 93 BPM | TEMPERATURE: 98.3 F | OXYGEN SATURATION: 97 % | RESPIRATION RATE: 18 BRPM

## 2021-02-25 PROCEDURE — G0157 HHC PT ASSISTANT EA 15: HCPCS

## 2021-02-25 PROCEDURE — 3331090002 HH PPS REVENUE DEBIT

## 2021-02-25 PROCEDURE — 3331090001 HH PPS REVENUE CREDIT

## 2021-02-26 ENCOUNTER — HOME CARE VISIT (OUTPATIENT)
Dept: SCHEDULING | Facility: HOME HEALTH | Age: 61
End: 2021-02-26
Payer: MEDICARE

## 2021-02-26 VITALS
SYSTOLIC BLOOD PRESSURE: 126 MMHG | DIASTOLIC BLOOD PRESSURE: 80 MMHG | HEART RATE: 65 BPM | RESPIRATION RATE: 17 BRPM | TEMPERATURE: 99.4 F | OXYGEN SATURATION: 96 %

## 2021-02-26 PROCEDURE — 3331090001 HH PPS REVENUE CREDIT

## 2021-02-26 PROCEDURE — G0156 HHCP-SVS OF AIDE,EA 15 MIN: HCPCS

## 2021-02-26 PROCEDURE — G0158 HHC OT ASSISTANT EA 15: HCPCS

## 2021-02-26 PROCEDURE — 3331090002 HH PPS REVENUE DEBIT

## 2021-02-27 PROCEDURE — 3331090002 HH PPS REVENUE DEBIT

## 2021-02-27 PROCEDURE — 3331090001 HH PPS REVENUE CREDIT

## 2021-02-28 PROCEDURE — 3331090002 HH PPS REVENUE DEBIT

## 2021-02-28 PROCEDURE — 3331090001 HH PPS REVENUE CREDIT

## 2021-03-01 ENCOUNTER — APPOINTMENT (OUTPATIENT)
Dept: PHYSICAL THERAPY | Age: 61
End: 2021-03-01

## 2021-03-01 ENCOUNTER — HOME CARE VISIT (OUTPATIENT)
Dept: SCHEDULING | Facility: HOME HEALTH | Age: 61
End: 2021-03-01
Payer: MEDICARE

## 2021-03-01 VITALS
DIASTOLIC BLOOD PRESSURE: 85 MMHG | OXYGEN SATURATION: 97 % | HEART RATE: 63 BPM | TEMPERATURE: 97.3 F | SYSTOLIC BLOOD PRESSURE: 124 MMHG

## 2021-03-01 PROCEDURE — 3331090001 HH PPS REVENUE CREDIT

## 2021-03-01 PROCEDURE — G0151 HHCP-SERV OF PT,EA 15 MIN: HCPCS

## 2021-03-01 PROCEDURE — 3331090002 HH PPS REVENUE DEBIT

## 2021-03-02 ENCOUNTER — HOME CARE VISIT (OUTPATIENT)
Dept: SCHEDULING | Facility: HOME HEALTH | Age: 61
End: 2021-03-02
Payer: MEDICARE

## 2021-03-02 PROCEDURE — 3331090001 HH PPS REVENUE CREDIT

## 2021-03-02 PROCEDURE — G0156 HHCP-SVS OF AIDE,EA 15 MIN: HCPCS

## 2021-03-02 PROCEDURE — 3331090002 HH PPS REVENUE DEBIT

## 2021-03-02 PROCEDURE — G0158 HHC OT ASSISTANT EA 15: HCPCS

## 2021-03-03 ENCOUNTER — OFFICE VISIT (OUTPATIENT)
Dept: ORTHOPEDIC SURGERY | Age: 61
End: 2021-03-03
Payer: MEDICARE

## 2021-03-03 ENCOUNTER — APPOINTMENT (OUTPATIENT)
Dept: PHYSICAL THERAPY | Age: 61
End: 2021-03-03

## 2021-03-03 VITALS — RESPIRATION RATE: 17 BRPM | SYSTOLIC BLOOD PRESSURE: 133 MMHG | DIASTOLIC BLOOD PRESSURE: 72 MMHG

## 2021-03-03 VITALS
RESPIRATION RATE: 16 BRPM | HEART RATE: 60 BPM | TEMPERATURE: 98.2 F | DIASTOLIC BLOOD PRESSURE: 76 MMHG | SYSTOLIC BLOOD PRESSURE: 134 MMHG | OXYGEN SATURATION: 94 %

## 2021-03-03 DIAGNOSIS — M75.112 INCOMPLETE TEAR OF LEFT ROTATOR CUFF, UNSPECIFIED WHETHER TRAUMATIC: ICD-10-CM

## 2021-03-03 DIAGNOSIS — M75.02 ADHESIVE CAPSULITIS OF LEFT SHOULDER: ICD-10-CM

## 2021-03-03 DIAGNOSIS — S93.492D SPRAIN OF ANTERIOR TALOFIBULAR LIGAMENT OF LEFT ANKLE, SUBSEQUENT ENCOUNTER: Primary | ICD-10-CM

## 2021-03-03 PROCEDURE — 73610 X-RAY EXAM OF ANKLE: CPT | Performed by: ORTHOPAEDIC SURGERY

## 2021-03-03 PROCEDURE — G8427 DOCREV CUR MEDS BY ELIG CLIN: HCPCS | Performed by: ORTHOPAEDIC SURGERY

## 2021-03-03 PROCEDURE — G9717 DOC PT DX DEP/BP F/U NT REQ: HCPCS | Performed by: ORTHOPAEDIC SURGERY

## 2021-03-03 PROCEDURE — 3331090001 HH PPS REVENUE CREDIT

## 2021-03-03 PROCEDURE — G9899 SCRN MAM PERF RSLTS DOC: HCPCS | Performed by: ORTHOPAEDIC SURGERY

## 2021-03-03 PROCEDURE — G8417 CALC BMI ABV UP PARAM F/U: HCPCS | Performed by: ORTHOPAEDIC SURGERY

## 2021-03-03 PROCEDURE — 3017F COLORECTAL CA SCREEN DOC REV: CPT | Performed by: ORTHOPAEDIC SURGERY

## 2021-03-03 PROCEDURE — 99213 OFFICE O/P EST LOW 20 MIN: CPT | Performed by: ORTHOPAEDIC SURGERY

## 2021-03-03 PROCEDURE — 3331090002 HH PPS REVENUE DEBIT
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
## 2021-03-04 ENCOUNTER — HOME CARE VISIT (OUTPATIENT)
Dept: SCHEDULING | Facility: HOME HEALTH | Age: 61
End: 2021-03-04
Payer: MEDICARE

## 2021-03-04 ENCOUNTER — TELEPHONE (OUTPATIENT)
Dept: ORTHOPEDIC SURGERY | Age: 61
End: 2021-03-04

## 2021-03-04 DIAGNOSIS — S93.492D SPRAIN OF ANTERIOR TALOFIBULAR LIGAMENT OF LEFT ANKLE, SUBSEQUENT ENCOUNTER: Primary | ICD-10-CM

## 2021-03-04 PROCEDURE — G0152 HHCP-SERV OF OT,EA 15 MIN: HCPCS

## 2021-03-04 PROCEDURE — 3331090002 HH PPS REVENUE DEBIT

## 2021-03-04 PROCEDURE — 3331090001 HH PPS REVENUE CREDIT

## 2021-03-04 NOTE — TELEPHONE ENCOUNTER
Spoke with patient. Patient provided with information on [facility], [address] [facility]. Patient will call back and let us know which facility she would like to have the orders faxed to.

## 2021-03-04 NOTE — TELEPHONE ENCOUNTER
Patient was unable to fill brace order at [facility] that we referred her to. It was discussed in yesterday's visit that we may need to order a custom orthotic brace and she feels that needs to be done. She also mentioned she lives in [place] and is unable to drive; she recommends a orthotic place in the area of [place].  Please advise patient at [phone]

## 2021-03-04 NOTE — TELEPHONE ENCOUNTER
Misael to provide order for custom brace as requested. In addition to [address], [address], and [facility] patient can try:        [facility]  [address] [facility], [address] [place] [address]  [phone]     [facility]  [address], [address]  [address] [facility], [address] [place] [address] Road  [address] [place] [address], MARY, PAErnestoC  3/4/2021  1:13 PM

## 2021-03-05 ENCOUNTER — HOME CARE VISIT (OUTPATIENT)
Dept: SCHEDULING | Facility: HOME HEALTH | Age: 61
End: 2021-03-05
Payer: MEDICARE

## 2021-03-05 ENCOUNTER — TELEPHONE (OUTPATIENT)
Dept: ORTHOPEDIC SURGERY | Age: 61
End: 2021-03-05

## 2021-03-05 VITALS
HEART RATE: 67 BPM | DIASTOLIC BLOOD PRESSURE: 88 MMHG | TEMPERATURE: 98.4 F | RESPIRATION RATE: 16 BRPM | OXYGEN SATURATION: 95 % | SYSTOLIC BLOOD PRESSURE: 136 MMHG

## 2021-03-05 PROCEDURE — 3331090001 HH PPS REVENUE CREDIT

## 2021-03-05 PROCEDURE — G0155 HHCP-SVS OF CSW,EA 15 MIN: HCPCS

## 2021-03-05 PROCEDURE — G0157 HHC PT ASSISTANT EA 15: HCPCS

## 2021-03-05 PROCEDURE — G0156 HHCP-SVS OF AIDE,EA 15 MIN: HCPCS

## 2021-03-05 PROCEDURE — 3331090002 HH PPS REVENUE DEBIT

## 2021-03-05 NOTE — TELEPHONE ENCOUNTER
Patient would like left knee brace order sent to [facility] [address] [facility] to fax [phone]

## 2021-03-06 VITALS
TEMPERATURE: 98.5 F | SYSTOLIC BLOOD PRESSURE: 124 MMHG | DIASTOLIC BLOOD PRESSURE: 85 MMHG | RESPIRATION RATE: 18 BRPM | OXYGEN SATURATION: 92 % | HEART RATE: 62 BPM

## 2021-03-06 PROCEDURE — 3331090001 HH PPS REVENUE CREDIT

## 2021-03-06 PROCEDURE — 3331090002 HH PPS REVENUE DEBIT

## 2021-03-07 PROCEDURE — 3331090002 HH PPS REVENUE DEBIT

## 2021-03-07 PROCEDURE — 3331090001 HH PPS REVENUE CREDIT

## 2021-03-08 ENCOUNTER — HOME CARE VISIT (OUTPATIENT)
Dept: SCHEDULING | Facility: HOME HEALTH | Age: 61
End: 2021-03-08
Payer: MEDICARE

## 2021-03-08 ENCOUNTER — APPOINTMENT (OUTPATIENT)
Dept: PHYSICAL THERAPY | Age: 61
End: 2021-03-08

## 2021-03-08 PROCEDURE — G0158 HHC OT ASSISTANT EA 15: HCPCS

## 2021-03-08 PROCEDURE — 3331090002 HH PPS REVENUE DEBIT

## 2021-03-08 PROCEDURE — 3331090001 HH PPS REVENUE CREDIT

## 2021-03-09 ENCOUNTER — HOME CARE VISIT (OUTPATIENT)
Dept: SCHEDULING | Facility: HOME HEALTH | Age: 61
End: 2021-03-09
Payer: MEDICARE

## 2021-03-09 VITALS
TEMPERATURE: 98.1 F | RESPIRATION RATE: 18 BRPM | HEART RATE: 84 BPM | DIASTOLIC BLOOD PRESSURE: 76 MMHG | SYSTOLIC BLOOD PRESSURE: 134 MMHG | OXYGEN SATURATION: 97 %

## 2021-03-09 VITALS
SYSTOLIC BLOOD PRESSURE: 118 MMHG | OXYGEN SATURATION: 93 % | DIASTOLIC BLOOD PRESSURE: 78 MMHG | RESPIRATION RATE: 16 BRPM | TEMPERATURE: 98.3 F | HEART RATE: 65 BPM

## 2021-03-09 PROCEDURE — G0157 HHC PT ASSISTANT EA 15: HCPCS

## 2021-03-09 PROCEDURE — 3331090001 HH PPS REVENUE CREDIT

## 2021-03-09 PROCEDURE — G0156 HHCP-SVS OF AIDE,EA 15 MIN: HCPCS

## 2021-03-09 PROCEDURE — 3331090002 HH PPS REVENUE DEBIT

## 2021-03-10 ENCOUNTER — HOME CARE VISIT (OUTPATIENT)
Dept: SCHEDULING | Facility: HOME HEALTH | Age: 61
End: 2021-03-10
Payer: MEDICARE

## 2021-03-10 ENCOUNTER — APPOINTMENT (OUTPATIENT)
Dept: PHYSICAL THERAPY | Age: 61
End: 2021-03-10

## 2021-03-10 PROCEDURE — G0158 HHC OT ASSISTANT EA 15: HCPCS

## 2021-03-10 PROCEDURE — 3331090002 HH PPS REVENUE DEBIT

## 2021-03-10 PROCEDURE — 3331090001 HH PPS REVENUE CREDIT

## 2021-03-10 NOTE — PROGRESS NOTES
[address] [place] [address], [facility] - Phone: [phone]  Fax: [phone] FOR PHYSICAL THERAPY          Patient Name: Regina Pérez : 1960   Treatment/Medical Diagnosis: Left shoulder pain [M25.512]   Onset Date:     Referral Source: Susanna Rand NP Start of Care [facility]): 21   Prior Hospitalization: See Medical History Provider #: [id number]   Prior Level of Function: Min pain with UE use   Comorbidities: Depression, OA, thyroid problem, HTN   Medications: Verified on Patient Summary List   Visits from [facility]: 2 Missed Visits: 0     Goal/Measure of Progress Goal Met? 1. Decrease max pain by 50-75% to assist with ADLs   Status at last Eval: 9/10 Current Status: na n/a   2. Increase L shoulder PROM by 30 deg to assist with ADLs   Status at last Eval: Shoulder AROM: Flx: R = 170 deg, L = 65 deg, [place]: R = 171 deg, L = 65 deg,   Ext: R = 50 deg, L = 14 deg,  ER: R = 37 deg, L = 17 deg,  IR behind the back: R = to L2 . L = to greater trochanter. Current Status: na n/a   3. Will rate >/= +5 on Global Rating of Change and be prepared to DC to HEP. Status at last Eval: na Current Status: na n/a     Key Functional Changes/Progress: Pt was seen for 1 follow up visit on land and demonstrated good tolerance. She attempted to start aquatic PT on 21, however fell on stairs to the pool. Pt was contacted multiple time, however did not return to PT treatment. Assessments/Recommendations: Discontinue therapy due to lack of attendance or compliance. If you have any questions/comments please contact us directly at [phone]. Thank you for allowing us to assist in the care of your patient.     Therapist Signature: Garry Fall, PT, OCS, SCS, CSCS Date: 3/10/2021   Reporting Period: 21 - 21 Time: 9:39 AM

## 2021-03-11 ENCOUNTER — HOME CARE VISIT (OUTPATIENT)
Dept: SCHEDULING | Facility: HOME HEALTH | Age: 61
End: 2021-03-11
Payer: MEDICARE

## 2021-03-11 ENCOUNTER — HOME CARE VISIT (OUTPATIENT)
Dept: HOME HEALTH SERVICES | Facility: HOME HEALTH | Age: 61
End: 2021-03-11
Payer: MEDICARE

## 2021-03-11 VITALS
RESPIRATION RATE: 18 BRPM | OXYGEN SATURATION: 93 % | SYSTOLIC BLOOD PRESSURE: 126 MMHG | TEMPERATURE: 97.9 F | DIASTOLIC BLOOD PRESSURE: 70 MMHG | HEART RATE: 67 BPM

## 2021-03-11 PROCEDURE — G0157 HHC PT ASSISTANT EA 15: HCPCS

## 2021-03-11 PROCEDURE — 3331090001 HH PPS REVENUE CREDIT

## 2021-03-11 PROCEDURE — 3331090002 HH PPS REVENUE DEBIT

## 2021-03-12 ENCOUNTER — HOME CARE VISIT (OUTPATIENT)
Dept: SCHEDULING | Facility: HOME HEALTH | Age: 61
End: 2021-03-12
Payer: MEDICARE

## 2021-03-12 VITALS
OXYGEN SATURATION: 98 % | TEMPERATURE: 98.1 F | RESPIRATION RATE: 16 BRPM | SYSTOLIC BLOOD PRESSURE: 132 MMHG | DIASTOLIC BLOOD PRESSURE: 70 MMHG | HEART RATE: 86 BPM

## 2021-03-12 PROCEDURE — G0156 HHCP-SVS OF AIDE,EA 15 MIN: HCPCS

## 2021-03-12 PROCEDURE — 3331090001 HH PPS REVENUE CREDIT

## 2021-03-12 PROCEDURE — 3331090002 HH PPS REVENUE DEBIT

## 2021-03-13 PROCEDURE — 3331090002 HH PPS REVENUE DEBIT

## 2021-03-13 PROCEDURE — 3331090001 HH PPS REVENUE CREDIT

## 2021-03-14 PROCEDURE — 3331090002 HH PPS REVENUE DEBIT

## 2021-03-14 PROCEDURE — 3331090001 HH PPS REVENUE CREDIT

## 2021-03-15 PROCEDURE — 3331090003 HH PPS REVENUE ADJ

## 2021-03-15 PROCEDURE — 3331090002 HH PPS REVENUE DEBIT

## 2021-03-15 PROCEDURE — 3331090001 HH PPS REVENUE CREDIT

## 2021-03-16 ENCOUNTER — HOME CARE VISIT (OUTPATIENT)
Dept: SCHEDULING | Facility: HOME HEALTH | Age: 61
End: 2021-03-16
Payer: MEDICARE

## 2021-03-16 ENCOUNTER — TELEPHONE (OUTPATIENT)
Dept: ORTHOPEDIC SURGERY | Age: 61
End: 2021-03-16

## 2021-03-16 ENCOUNTER — HOME CARE VISIT (OUTPATIENT)
Dept: HOME HEALTH SERVICES | Facility: HOME HEALTH | Age: 61
End: 2021-03-16
Payer: MEDICARE

## 2021-03-16 ENCOUNTER — TELEPHONE (OUTPATIENT)
Dept: FAMILY MEDICINE CLINIC | Age: 61
End: 2021-03-16

## 2021-03-16 VITALS
DIASTOLIC BLOOD PRESSURE: 86 MMHG | OXYGEN SATURATION: 94 % | SYSTOLIC BLOOD PRESSURE: 138 MMHG | RESPIRATION RATE: 17 BRPM | HEART RATE: 63 BPM | TEMPERATURE: 98.1 F

## 2021-03-16 DIAGNOSIS — S93.492D SPRAIN OF ANTERIOR TALOFIBULAR LIGAMENT OF LEFT ANKLE, SUBSEQUENT ENCOUNTER: Primary | ICD-10-CM

## 2021-03-16 PROCEDURE — 3331090002 HH PPS REVENUE DEBIT

## 2021-03-16 PROCEDURE — 400013 HH SOC

## 2021-03-16 PROCEDURE — 400018 HH-NO PAY CLAIM PROCEDURE

## 2021-03-16 PROCEDURE — G0158 HHC OT ASSISTANT EA 15: HCPCS

## 2021-03-16 PROCEDURE — 3331090001 HH PPS REVENUE CREDIT

## 2021-03-16 NOTE — TELEPHONE ENCOUNTER
Patient is requesting orders for a bariatric hospital bed and a stand up recliner for use after her procedure.       Patient [phone]

## 2021-03-16 NOTE — TELEPHONE ENCOUNTER
Patient called and stated she need to speak with NP Chucky Mandel. Patient did not go into details as to why she was calling.  Please give [employer] a call back at [phone]

## 2021-03-16 NOTE — TELEPHONE ENCOUNTER
Patient is requesting an extension of orders for her O/T, P/T and [address] (for bathing).     The patient can be reached at W#[phone]

## 2021-03-16 NOTE — TELEPHONE ENCOUNTER
Okay to provide order as requested. Leny Damian [facility], MPA, PA-C  3/16/2021   4:58 PM

## 2021-03-17 ENCOUNTER — OFFICE VISIT (OUTPATIENT)
Dept: ORTHOPEDIC SURGERY | Age: 61
End: 2021-03-17
Payer: MEDICARE

## 2021-03-17 ENCOUNTER — HOSPITAL ENCOUNTER (OUTPATIENT)
Dept: LAB | Age: 61
Discharge: HOME | End: 2021-03-17
Payer: MEDICARE

## 2021-03-17 VITALS
RESPIRATION RATE: 16 BRPM | TEMPERATURE: 97.1 F | SYSTOLIC BLOOD PRESSURE: 157 MMHG | HEART RATE: 61 BPM | DIASTOLIC BLOOD PRESSURE: 86 MMHG | OXYGEN SATURATION: 93 %

## 2021-03-17 DIAGNOSIS — M25.512 CHRONIC LEFT SHOULDER PAIN: Primary | ICD-10-CM

## 2021-03-17 DIAGNOSIS — M19.012 ARTHRITIS OF LEFT SHOULDER REGION: ICD-10-CM

## 2021-03-17 DIAGNOSIS — G89.29 CHRONIC LEFT SHOULDER PAIN: Primary | ICD-10-CM

## 2021-03-17 DIAGNOSIS — M75.112 INCOMPLETE TEAR OF LEFT ROTATOR CUFF, UNSPECIFIED WHETHER TRAUMATIC: ICD-10-CM

## 2021-03-17 PROCEDURE — G8417 CALC BMI ABV UP PARAM F/U: HCPCS | Performed by: ORTHOPAEDIC SURGERY

## 2021-03-17 PROCEDURE — 3017F COLORECTAL CA SCREEN DOC REV: CPT | Performed by: ORTHOPAEDIC SURGERY

## 2021-03-17 PROCEDURE — G8427 DOCREV CUR MEDS BY ELIG CLIN: HCPCS | Performed by: ORTHOPAEDIC SURGERY

## 2021-03-17 PROCEDURE — G9899 SCRN MAM PERF RSLTS DOC: HCPCS | Performed by: ORTHOPAEDIC SURGERY

## 2021-03-17 PROCEDURE — 99214 OFFICE O/P EST MOD 30 MIN: CPT | Performed by: ORTHOPAEDIC SURGERY

## 2021-03-17 PROCEDURE — 73030 X-RAY EXAM OF SHOULDER: CPT | Performed by: ORTHOPAEDIC SURGERY

## 2021-03-17 PROCEDURE — 80307 DRUG TEST PRSMV CHEM ANLYZR: CPT

## 2021-03-17 PROCEDURE — 3331090002 HH PPS REVENUE DEBIT

## 2021-03-17 PROCEDURE — G9717 DOC PT DX DEP/BP F/U NT REQ: HCPCS | Performed by: ORTHOPAEDIC SURGERY

## 2021-03-17 PROCEDURE — 3331090001 HH PPS REVENUE CREDIT
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
## 2021-03-17 NOTE — TELEPHONE ENCOUNTER
Dr. Michaela Lynn is not planning to perform any procedures on the patient. Leny Baltazar [facility], MARY, PA-C  3/17/2021  10:52 AM

## 2021-03-17 NOTE — TELEPHONE ENCOUNTER
Tried calling patient but no answer.  Left voicemail asking her to call back so we can find out what procedure she is talking about

## 2021-03-18 ENCOUNTER — HOME CARE VISIT (OUTPATIENT)
Dept: SCHEDULING | Facility: HOME HEALTH | Age: 61
End: 2021-03-18
Payer: MEDICARE

## 2021-03-18 VITALS
SYSTOLIC BLOOD PRESSURE: 160 MMHG | TEMPERATURE: 99.2 F | OXYGEN SATURATION: 95 % | HEART RATE: 61 BPM | DIASTOLIC BLOOD PRESSURE: 85 MMHG

## 2021-03-18 PROCEDURE — G0152 HHCP-SERV OF OT,EA 15 MIN: HCPCS

## 2021-03-18 PROCEDURE — G0151 HHCP-SERV OF PT,EA 15 MIN: HCPCS

## 2021-03-18 PROCEDURE — 3331090001 HH PPS REVENUE CREDIT

## 2021-03-18 PROCEDURE — 3331090002 HH PPS REVENUE DEBIT

## 2021-03-19 VITALS
OXYGEN SATURATION: 95 % | SYSTOLIC BLOOD PRESSURE: 152 MMHG | HEART RATE: 61 BPM | TEMPERATURE: 99.2 F | DIASTOLIC BLOOD PRESSURE: 80 MMHG | RESPIRATION RATE: 17 BRPM

## 2021-03-19 PROCEDURE — 3331090001 HH PPS REVENUE CREDIT

## 2021-03-19 PROCEDURE — 3331090002 HH PPS REVENUE DEBIT

## 2021-03-20 PROCEDURE — 3331090002 HH PPS REVENUE DEBIT

## 2021-03-20 PROCEDURE — 3331090001 HH PPS REVENUE CREDIT

## 2021-03-20 NOTE — TELEPHONE ENCOUNTER
Mrs Jed Gonzales returned my call. After her accident at physical therapy, she was told she had a rotator cuff tear. She had home health physical therapy. She saw a doctor who would not care for her due to her size, did not examine her, diagnosed her with a frozen shoulder  Saw dr Aleksandr Ugalde yesterday and she was told she has arthritis and PT will nto help so PT was stopped  She has applied for medicaide and in process of lorene for [facility] for personal care. Dr. Shawn Pablo said he could not operate on her due to her weight. She cannot exercise due to her knees. Making it difficult for her lose weigt. She is followed by pain management at [facility]  We discussed bariatric surgery, she states she is a binge eater and has been in the program 2 times, she has met several people who have regained weight and others with bad side effects. She feels lost and does not know where to turn. Recommend she talk with her pain management doctor about other interventions for her pain. She has requested electric scooter, dme order placed.

## 2021-03-21 PROCEDURE — 3331090001 HH PPS REVENUE CREDIT

## 2021-03-21 PROCEDURE — 3331090002 HH PPS REVENUE DEBIT

## 2021-03-22 ENCOUNTER — TELEPHONE (OUTPATIENT)
Dept: FAMILY MEDICINE CLINIC | Age: 61
End: 2021-03-22

## 2021-03-22 DIAGNOSIS — S83.231S COMPLEX TEAR OF MEDIAL MENISCUS OF RIGHT KNEE AS CURRENT INJURY, SEQUELA: ICD-10-CM

## 2021-03-22 DIAGNOSIS — M51.36 LUMBAR DEGENERATIVE DISC DISEASE: ICD-10-CM

## 2021-03-22 DIAGNOSIS — G89.29 OTHER CHRONIC PAIN: Primary | ICD-10-CM

## 2021-03-22 DIAGNOSIS — M17.0 PRIMARY OSTEOARTHRITIS OF BOTH KNEES: ICD-10-CM

## 2021-03-22 PROCEDURE — 3331090002 HH PPS REVENUE DEBIT

## 2021-03-22 PROCEDURE — 3331090001 HH PPS REVENUE CREDIT

## 2021-03-22 NOTE — TELEPHONE ENCOUNTER
Patient called stating she need orders faxed over to [facility] for a scooter, handicap toilet, and a bariatric rollator. Fax # [phone] Patient is also requesting the orders to be mailed to her resident just in case the [facility] need a hard copy.

## 2021-03-23 ENCOUNTER — TELEPHONE (OUTPATIENT)
Dept: FAMILY MEDICINE CLINIC | Age: 61
End: 2021-03-23

## 2021-03-23 PROCEDURE — 3331090001 HH PPS REVENUE CREDIT

## 2021-03-23 PROCEDURE — 3331090002 HH PPS REVENUE DEBIT

## 2021-03-23 NOTE — TELEPHONE ENCOUNTER
----- Message from Arya Haji sent at 3/23/2021  1:13 PM EDT -----  Regarding: message for office  Pt needs a letter of medical necessity on web site [employer]. Fax number is on the bottom for [employer]. Please list torn ligament and ankle, torn ligament and knee, rotator cuff injury and morbid obesity.  Please contact pt  [phone]

## 2021-03-24 ENCOUNTER — TELEPHONE (OUTPATIENT)
Dept: ORTHOPEDIC SURGERY | Age: 61
End: 2021-03-24

## 2021-03-24 ENCOUNTER — HOME CARE VISIT (OUTPATIENT)
Dept: SCHEDULING | Facility: HOME HEALTH | Age: 61
End: 2021-03-24
Payer: MEDICARE

## 2021-03-24 VITALS
OXYGEN SATURATION: 96 % | DIASTOLIC BLOOD PRESSURE: 85 MMHG | SYSTOLIC BLOOD PRESSURE: 128 MMHG | TEMPERATURE: 97.9 F | HEART RATE: 58 BPM

## 2021-03-24 PROCEDURE — G0157 HHC PT ASSISTANT EA 15: HCPCS

## 2021-03-24 PROCEDURE — 3331090002 HH PPS REVENUE DEBIT

## 2021-03-24 PROCEDURE — G0152 HHCP-SERV OF OT,EA 15 MIN: HCPCS

## 2021-03-24 PROCEDURE — 3331090001 HH PPS REVENUE CREDIT

## 2021-03-24 NOTE — TELEPHONE ENCOUNTER
Ngoc Edward from [facility] of [address] [place] [address] called to request a letter of medical necessity form to be signed by Dr. Coby Segura for the patient.      Genie's contact is [phone] and fax number is [phone]

## 2021-03-25 ENCOUNTER — HOME CARE VISIT (OUTPATIENT)
Dept: SCHEDULING | Facility: HOME HEALTH | Age: 61
End: 2021-03-25
Payer: MEDICARE

## 2021-03-25 ENCOUNTER — HOSPITAL ENCOUNTER (OUTPATIENT)
Dept: MRI IMAGING | Age: 61
Discharge: HOME | End: 2021-03-25
Attending: PHYSICIAN ASSISTANT

## 2021-03-25 VITALS
HEART RATE: 70 BPM | RESPIRATION RATE: 18 BRPM | SYSTOLIC BLOOD PRESSURE: 132 MMHG | OXYGEN SATURATION: 93 % | TEMPERATURE: 98.1 F | DIASTOLIC BLOOD PRESSURE: 80 MMHG

## 2021-03-25 VITALS
DIASTOLIC BLOOD PRESSURE: 76 MMHG | RESPIRATION RATE: 17 BRPM | HEART RATE: 58 BPM | TEMPERATURE: 98.3 F | SYSTOLIC BLOOD PRESSURE: 144 MMHG | OXYGEN SATURATION: 97 %

## 2021-03-25 DIAGNOSIS — I71.21 ASCENDING AORTIC ANEURYSM: ICD-10-CM

## 2021-03-25 PROCEDURE — G0157 HHC PT ASSISTANT EA 15: HCPCS

## 2021-03-25 PROCEDURE — 3331090001 HH PPS REVENUE CREDIT

## 2021-03-25 PROCEDURE — G0152 HHCP-SERV OF OT,EA 15 MIN: HCPCS

## 2021-03-25 PROCEDURE — 3331090002 HH PPS REVENUE DEBIT

## 2021-03-26 ENCOUNTER — HOME CARE VISIT (OUTPATIENT)
Dept: SCHEDULING | Facility: HOME HEALTH | Age: 61
End: 2021-03-26
Payer: MEDICARE

## 2021-03-26 VITALS
DIASTOLIC BLOOD PRESSURE: 80 MMHG | HEART RATE: 70 BPM | SYSTOLIC BLOOD PRESSURE: 132 MMHG | TEMPERATURE: 98.1 F | RESPIRATION RATE: 18 BRPM | OXYGEN SATURATION: 93 %

## 2021-03-26 PROCEDURE — 3331090002 HH PPS REVENUE DEBIT

## 2021-03-26 PROCEDURE — 3331090001 HH PPS REVENUE CREDIT

## 2021-03-27 PROCEDURE — 3331090002 HH PPS REVENUE DEBIT

## 2021-03-27 PROCEDURE — 3331090001 HH PPS REVENUE CREDIT

## 2021-03-28 PROCEDURE — 3331090002 HH PPS REVENUE DEBIT

## 2021-03-28 PROCEDURE — 3331090001 HH PPS REVENUE CREDIT

## 2021-03-29 ENCOUNTER — HOME CARE VISIT (OUTPATIENT)
Dept: SCHEDULING | Facility: HOME HEALTH | Age: 61
End: 2021-03-29
Payer: MEDICARE

## 2021-03-29 PROCEDURE — 3331090002 HH PPS REVENUE DEBIT

## 2021-03-29 PROCEDURE — G0157 HHC PT ASSISTANT EA 15: HCPCS

## 2021-03-29 PROCEDURE — 3331090001 HH PPS REVENUE CREDIT

## 2021-03-30 ENCOUNTER — HOME CARE VISIT (OUTPATIENT)
Dept: SCHEDULING | Facility: HOME HEALTH | Age: 61
End: 2021-03-30
Payer: MEDICARE

## 2021-03-30 VITALS
HEART RATE: 58 BPM | TEMPERATURE: 98.4 F | SYSTOLIC BLOOD PRESSURE: 132 MMHG | OXYGEN SATURATION: 94 % | RESPIRATION RATE: 18 BRPM | DIASTOLIC BLOOD PRESSURE: 80 MMHG

## 2021-03-30 VITALS
OXYGEN SATURATION: 92 % | RESPIRATION RATE: 16 BRPM | TEMPERATURE: 98.1 F | HEART RATE: 62 BPM | DIASTOLIC BLOOD PRESSURE: 70 MMHG | SYSTOLIC BLOOD PRESSURE: 110 MMHG

## 2021-03-30 PROCEDURE — 3331090001 HH PPS REVENUE CREDIT

## 2021-03-30 PROCEDURE — 3331090002 HH PPS REVENUE DEBIT

## 2021-03-30 PROCEDURE — G0158 HHC OT ASSISTANT EA 15: HCPCS

## 2021-03-31 ENCOUNTER — HOME CARE VISIT (OUTPATIENT)
Dept: SCHEDULING | Facility: HOME HEALTH | Age: 61
End: 2021-03-31
Payer: MEDICARE

## 2021-03-31 PROCEDURE — 3331090001 HH PPS REVENUE CREDIT

## 2021-03-31 PROCEDURE — G0157 HHC PT ASSISTANT EA 15: HCPCS

## 2021-03-31 PROCEDURE — 3331090002 HH PPS REVENUE DEBIT

## 2021-04-01 ENCOUNTER — HOME CARE VISIT (OUTPATIENT)
Dept: SCHEDULING | Facility: HOME HEALTH | Age: 61
End: 2021-04-01
Payer: MEDICARE

## 2021-04-01 VITALS
SYSTOLIC BLOOD PRESSURE: 122 MMHG | RESPIRATION RATE: 18 BRPM | TEMPERATURE: 97.9 F | HEART RATE: 60 BPM | OXYGEN SATURATION: 93 % | DIASTOLIC BLOOD PRESSURE: 70 MMHG

## 2021-04-01 PROCEDURE — 3331090001 HH PPS REVENUE CREDIT

## 2021-04-01 PROCEDURE — 3331090002 HH PPS REVENUE DEBIT

## 2021-04-02 PROCEDURE — 3331090002 HH PPS REVENUE DEBIT

## 2021-04-02 PROCEDURE — 3331090001 HH PPS REVENUE CREDIT

## 2021-04-03 ENCOUNTER — HOME CARE VISIT (OUTPATIENT)
Dept: SCHEDULING | Facility: HOME HEALTH | Age: 61
End: 2021-04-03
Payer: MEDICARE

## 2021-04-03 PROCEDURE — G0156 HHCP-SVS OF AIDE,EA 15 MIN: HCPCS

## 2021-04-03 PROCEDURE — 3331090001 HH PPS REVENUE CREDIT

## 2021-04-03 PROCEDURE — 3331090002 HH PPS REVENUE DEBIT

## 2021-04-04 PROCEDURE — 3331090002 HH PPS REVENUE DEBIT

## 2021-04-04 PROCEDURE — 3331090001 HH PPS REVENUE CREDIT

## 2021-04-05 ENCOUNTER — HOME CARE VISIT (OUTPATIENT)
Dept: HOME HEALTH SERVICES | Facility: HOME HEALTH | Age: 61
End: 2021-04-05
Payer: MEDICARE

## 2021-04-05 ENCOUNTER — HOME CARE VISIT (OUTPATIENT)
Dept: SCHEDULING | Facility: HOME HEALTH | Age: 61
End: 2021-04-05
Payer: MEDICARE

## 2021-04-05 VITALS
DIASTOLIC BLOOD PRESSURE: 65 MMHG | TEMPERATURE: 97.5 F | RESPIRATION RATE: 16 BRPM | OXYGEN SATURATION: 91 % | HEART RATE: 74 BPM | SYSTOLIC BLOOD PRESSURE: 104 MMHG

## 2021-04-05 PROCEDURE — 3331090002 HH PPS REVENUE DEBIT

## 2021-04-05 PROCEDURE — 3331090001 HH PPS REVENUE CREDIT

## 2021-04-05 PROCEDURE — G0157 HHC PT ASSISTANT EA 15: HCPCS

## 2021-04-06 ENCOUNTER — HOME CARE VISIT (OUTPATIENT)
Dept: SCHEDULING | Facility: HOME HEALTH | Age: 61
End: 2021-04-06
Payer: MEDICARE

## 2021-04-06 VITALS
OXYGEN SATURATION: 94 % | TEMPERATURE: 97.9 F | DIASTOLIC BLOOD PRESSURE: 74 MMHG | SYSTOLIC BLOOD PRESSURE: 130 MMHG | HEART RATE: 66 BPM | RESPIRATION RATE: 18 BRPM

## 2021-04-06 PROCEDURE — G0158 HHC OT ASSISTANT EA 15: HCPCS

## 2021-04-06 PROCEDURE — 3331090002 HH PPS REVENUE DEBIT

## 2021-04-06 PROCEDURE — G0156 HHCP-SVS OF AIDE,EA 15 MIN: HCPCS

## 2021-04-06 PROCEDURE — 3331090001 HH PPS REVENUE CREDIT

## 2021-04-07 ENCOUNTER — HOME CARE VISIT (OUTPATIENT)
Dept: SCHEDULING | Facility: HOME HEALTH | Age: 61
End: 2021-04-07
Payer: MEDICARE

## 2021-04-07 VITALS
SYSTOLIC BLOOD PRESSURE: 130 MMHG | TEMPERATURE: 97.3 F | DIASTOLIC BLOOD PRESSURE: 65 MMHG | HEART RATE: 60 BPM | OXYGEN SATURATION: 92 %

## 2021-04-07 PROCEDURE — 3331090002 HH PPS REVENUE DEBIT

## 2021-04-07 PROCEDURE — 3331090001 HH PPS REVENUE CREDIT

## 2021-04-07 PROCEDURE — G0157 HHC PT ASSISTANT EA 15: HCPCS

## 2021-04-08 ENCOUNTER — HOME CARE VISIT (OUTPATIENT)
Dept: SCHEDULING | Facility: HOME HEALTH | Age: 61
End: 2021-04-08
Payer: MEDICARE

## 2021-04-08 VITALS
RESPIRATION RATE: 18 BRPM | TEMPERATURE: 98.3 F | SYSTOLIC BLOOD PRESSURE: 130 MMHG | DIASTOLIC BLOOD PRESSURE: 76 MMHG | OXYGEN SATURATION: 97 % | HEART RATE: 72 BPM

## 2021-04-08 PROCEDURE — 3331090002 HH PPS REVENUE DEBIT

## 2021-04-08 PROCEDURE — 3331090001 HH PPS REVENUE CREDIT

## 2021-04-08 PROCEDURE — G0158 HHC OT ASSISTANT EA 15: HCPCS

## 2021-04-08 PROCEDURE — G0156 HHCP-SVS OF AIDE,EA 15 MIN: HCPCS

## 2021-04-09 PROCEDURE — 3331090001 HH PPS REVENUE CREDIT

## 2021-04-09 PROCEDURE — 3331090002 HH PPS REVENUE DEBIT

## 2021-04-10 PROCEDURE — 3331090001 HH PPS REVENUE CREDIT

## 2021-04-10 PROCEDURE — 3331090002 HH PPS REVENUE DEBIT

## 2021-04-11 PROCEDURE — 3331090001 HH PPS REVENUE CREDIT

## 2021-04-11 PROCEDURE — 3331090002 HH PPS REVENUE DEBIT

## 2021-04-12 ENCOUNTER — HOME CARE VISIT (OUTPATIENT)
Dept: SCHEDULING | Facility: HOME HEALTH | Age: 61
End: 2021-04-12
Payer: MEDICARE

## 2021-04-12 VITALS
DIASTOLIC BLOOD PRESSURE: 68 MMHG | RESPIRATION RATE: 16 BRPM | SYSTOLIC BLOOD PRESSURE: 110 MMHG | TEMPERATURE: 97.2 F | OXYGEN SATURATION: 93 % | HEART RATE: 68 BPM

## 2021-04-12 PROCEDURE — 3331090001 HH PPS REVENUE CREDIT

## 2021-04-12 PROCEDURE — G0157 HHC PT ASSISTANT EA 15: HCPCS

## 2021-04-12 PROCEDURE — 3331090002 HH PPS REVENUE DEBIT

## 2021-04-13 ENCOUNTER — HOME CARE VISIT (OUTPATIENT)
Dept: SCHEDULING | Facility: HOME HEALTH | Age: 61
End: 2021-04-13
Payer: MEDICARE

## 2021-04-13 VITALS
HEART RATE: 71 BPM | OXYGEN SATURATION: 93 % | SYSTOLIC BLOOD PRESSURE: 130 MMHG | TEMPERATURE: 98.3 F | RESPIRATION RATE: 18 BRPM | DIASTOLIC BLOOD PRESSURE: 78 MMHG

## 2021-04-13 VITALS
HEART RATE: 71 BPM | TEMPERATURE: 98.3 F | SYSTOLIC BLOOD PRESSURE: 130 MMHG | DIASTOLIC BLOOD PRESSURE: 78 MMHG | OXYGEN SATURATION: 93 %

## 2021-04-13 PROCEDURE — 3331090001 HH PPS REVENUE CREDIT

## 2021-04-13 PROCEDURE — G0151 HHCP-SERV OF PT,EA 15 MIN: HCPCS

## 2021-04-13 PROCEDURE — G0158 HHC OT ASSISTANT EA 15: HCPCS

## 2021-04-13 PROCEDURE — 3331090002 HH PPS REVENUE DEBIT

## 2021-04-14 ENCOUNTER — HOME CARE VISIT (OUTPATIENT)
Dept: SCHEDULING | Facility: HOME HEALTH | Age: 61
End: 2021-04-14
Payer: MEDICARE

## 2021-04-14 ENCOUNTER — OFFICE VISIT (OUTPATIENT)
Dept: ORTHOPEDIC SURGERY | Age: 61
End: 2021-04-14
Payer: MEDICARE

## 2021-04-14 VITALS
HEART RATE: 60 BPM | OXYGEN SATURATION: 93 % | RESPIRATION RATE: 17 BRPM | DIASTOLIC BLOOD PRESSURE: 64 MMHG | TEMPERATURE: 98.5 F | SYSTOLIC BLOOD PRESSURE: 97 MMHG

## 2021-04-14 VITALS
HEART RATE: 64 BPM | OXYGEN SATURATION: 95 % | BODY MASS INDEX: 39.68 KG/M2 | HEIGHT: 72 IN | RESPIRATION RATE: 16 BRPM | WEIGHT: 293 LBS

## 2021-04-14 DIAGNOSIS — G89.29 CHRONIC LEFT SHOULDER PAIN: Primary | ICD-10-CM

## 2021-04-14 DIAGNOSIS — M25.561 CHRONIC PAIN OF BOTH KNEES: ICD-10-CM

## 2021-04-14 DIAGNOSIS — G89.29 CHRONIC PAIN OF BOTH KNEES: ICD-10-CM

## 2021-04-14 DIAGNOSIS — M25.512 CHRONIC LEFT SHOULDER PAIN: Primary | ICD-10-CM

## 2021-04-14 DIAGNOSIS — M25.562 CHRONIC PAIN OF BOTH KNEES: ICD-10-CM

## 2021-04-14 PROCEDURE — 99213 OFFICE O/P EST LOW 20 MIN: CPT | Performed by: ORTHOPAEDIC SURGERY

## 2021-04-14 PROCEDURE — 3331090002 HH PPS REVENUE DEBIT

## 2021-04-14 PROCEDURE — G8427 DOCREV CUR MEDS BY ELIG CLIN: HCPCS | Performed by: ORTHOPAEDIC SURGERY

## 2021-04-14 PROCEDURE — G9717 DOC PT DX DEP/BP F/U NT REQ: HCPCS | Performed by: ORTHOPAEDIC SURGERY

## 2021-04-14 PROCEDURE — 3017F COLORECTAL CA SCREEN DOC REV: CPT | Performed by: ORTHOPAEDIC SURGERY

## 2021-04-14 PROCEDURE — 3331090003 HH PPS REVENUE ADJ

## 2021-04-14 PROCEDURE — 3331090001 HH PPS REVENUE CREDIT

## 2021-04-14 PROCEDURE — G9899 SCRN MAM PERF RSLTS DOC: HCPCS | Performed by: ORTHOPAEDIC SURGERY

## 2021-04-14 PROCEDURE — G0152 HHCP-SERV OF OT,EA 15 MIN: HCPCS

## 2021-04-14 PROCEDURE — G8417 CALC BMI ABV UP PARAM F/U: HCPCS | Performed by: ORTHOPAEDIC SURGERY
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
## 2021-05-04 ENCOUNTER — HOSPITAL ENCOUNTER (OUTPATIENT)
Age: 61
Discharge: HOME | End: 2021-05-04
Attending: ORTHOPAEDIC SURGERY
Payer: MEDICARE

## 2021-05-04 PROCEDURE — 73221 MRI JOINT UPR EXTREM W/O DYE: CPT

## 2021-05-27 ENCOUNTER — TRANSCRIBE ORDER (OUTPATIENT)
Dept: SCHEDULING | Age: 61
End: 2021-05-27

## 2021-05-27 DIAGNOSIS — M54.16 LUMBAR RADICULOPATHY: Primary | ICD-10-CM

## 2021-06-15 ENCOUNTER — OFFICE VISIT (OUTPATIENT)
Dept: ORTHOPEDIC SURGERY | Age: 61
End: 2021-06-15
Payer: MEDICARE

## 2021-06-15 ENCOUNTER — HOSPITAL ENCOUNTER (OUTPATIENT)
Age: 61
Discharge: HOME | End: 2021-06-15
Attending: PHYSICAL MEDICINE & REHABILITATION
Payer: MEDICARE

## 2021-06-15 ENCOUNTER — OFFICE VISIT (OUTPATIENT)
Dept: SURGERY | Age: 61
End: 2021-06-15

## 2021-06-15 VITALS
HEART RATE: 65 BPM | SYSTOLIC BLOOD PRESSURE: 148 MMHG | DIASTOLIC BLOOD PRESSURE: 86 MMHG | BODY MASS INDEX: 52.77 KG/M2 | HEIGHT: 72 IN

## 2021-06-15 VITALS — HEART RATE: 74 BPM | TEMPERATURE: 98 F | BODY MASS INDEX: 52.77 KG/M2 | OXYGEN SATURATION: 95 % | HEIGHT: 72 IN

## 2021-06-15 DIAGNOSIS — M54.16 LUMBAR RADICULOPATHY: ICD-10-CM

## 2021-06-15 DIAGNOSIS — S93.492D SPRAIN OF ANTERIOR TALOFIBULAR LIGAMENT OF LEFT ANKLE, SUBSEQUENT ENCOUNTER: Primary | ICD-10-CM

## 2021-06-15 DIAGNOSIS — T14.8XXA SPLINTER IN SKIN: ICD-10-CM

## 2021-06-15 DIAGNOSIS — M79.5 FOREIGN BODY (FB) IN SOFT TISSUE: Primary | ICD-10-CM

## 2021-06-15 PROCEDURE — 99205 OFFICE O/P NEW HI 60 MIN: CPT | Performed by: SURGERY

## 2021-06-15 PROCEDURE — G9899 SCRN MAM PERF RSLTS DOC: HCPCS | Performed by: SURGERY

## 2021-06-15 PROCEDURE — G8427 DOCREV CUR MEDS BY ELIG CLIN: HCPCS | Performed by: SURGERY

## 2021-06-15 PROCEDURE — G9899 SCRN MAM PERF RSLTS DOC: HCPCS | Performed by: ORTHOPAEDIC SURGERY

## 2021-06-15 PROCEDURE — G8417 CALC BMI ABV UP PARAM F/U: HCPCS | Performed by: SURGERY

## 2021-06-15 PROCEDURE — G8417 CALC BMI ABV UP PARAM F/U: HCPCS | Performed by: ORTHOPAEDIC SURGERY

## 2021-06-15 PROCEDURE — 3017F COLORECTAL CA SCREEN DOC REV: CPT | Performed by: SURGERY

## 2021-06-15 PROCEDURE — G9717 DOC PT DX DEP/BP F/U NT REQ: HCPCS | Performed by: ORTHOPAEDIC SURGERY

## 2021-06-15 PROCEDURE — 73721 MRI JNT OF LWR EXTRE W/O DYE: CPT

## 2021-06-15 PROCEDURE — G8427 DOCREV CUR MEDS BY ELIG CLIN: HCPCS | Performed by: ORTHOPAEDIC SURGERY

## 2021-06-15 PROCEDURE — 3017F COLORECTAL CA SCREEN DOC REV: CPT | Performed by: ORTHOPAEDIC SURGERY

## 2021-06-15 PROCEDURE — G9717 DOC PT DX DEP/BP F/U NT REQ: HCPCS | Performed by: SURGERY

## 2021-06-15 PROCEDURE — 99213 OFFICE O/P EST LOW 20 MIN: CPT | Performed by: ORTHOPAEDIC SURGERY
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
## 2021-06-15 NOTE — PROGRESS NOTES
[address] [place] [address] Dr Ordonez [facility], [address] [facility]                                 OPERATIVE REPORT    PATIENT:    Polo Acuna  MRN:           [id number]   DATE:   6/15/2021  BILLING:       ROOM:       Room/bed info not found  ATTENDING:   Rachell Herr MD  DICTATING:   Rachell Herr MD      Preoperative Dx: Foreign body right buttock    Postoperative Dx: Same     Procedure: Excisional biopsy of foreign body right buttock    Surgeon:  Luly Blanca MD    Assistant: Alok Drummond    Anesthesia:   Local -1% lidocaine with epinephrine    Findings:   No evidence of foreign body retained    Specimen: None    EBL: 5 mL    Fluid: 0 mL    Complications:  None    Implants: None    Brief Operative Indication:   Retained foreign body right buttock    Description of operation :  The patient was identified in the preoperative area. Informed consent was obtained from the patient. The patient was positioned left lateral decubitus on the table. The skin was prepped with Chlorhexadine and sterile drape applied. Time out was performed. Briefing was performed. The local anesthetic was infiltrated into the skin and subcutaneous tissues. The # 11 blade was used to create an incision 2 cm L  X 2 cm W to excise the 1 cm L X 1 cm W mass of phlegmon tissue . Sharp dissection was used to excise the overlying skin and underlying connective tissue. The deepest layer of dissection was the subcutaneous tissue. The wound was cleaned with sterile saline. No evidence of retained splinter was seen within the buttock the skin was re approximated using 4-0 Vicryl suture in a running subcuticular closure. Clean dressings were applied. She tolerated the procedure well. Disposition: She was stable transport to the recovery.

## 2021-06-15 NOTE — PROGRESS NOTES
[facility]  OFFICE PROCEDURE PROGRESS NOTE        Chart reviewed for the following:   Keanu Hurtado MD, have reviewed the History, Physical and updated the Allergic reactions for [address] performed immediately prior to start of procedure:   Keanu Hurtado MD, have performed the following reviews on [facility] [address] prior to the start of the procedure:            * Patient was identified by name and date of birth   * Agreement on procedure being performed was verified  * Risks and Benefits explained to the patient  * Procedure site verified and marked as necessary  * Patient was positioned for comfort  * Consent was signed and verified     Time: 1100 hrs      Date of procedure: 6/15/2021    Procedure performed by:  Rachell Herr MD    Provider assisted by: Alok Drummond MA    Patient assisted by: self    How tolerated by patient: tolerated the procedure well with no complications    Post Procedural Pain Scale: 0 - No Hurt    Comments: none
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
## 2021-06-16 ENCOUNTER — TELEPHONE (OUTPATIENT)
Dept: SURGERY | Age: 61
End: 2021-06-16

## 2021-06-16 NOTE — TELEPHONE ENCOUNTER
Pt called with questions no pool for two weeks wash area with soap and water pat dry cover with gauze

## 2021-07-06 ENCOUNTER — PATIENT MESSAGE (OUTPATIENT)
Dept: FAMILY MEDICINE CLINIC | Age: 61
End: 2021-07-06
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
## 2021-08-03 PROBLEM — M47.816 LUMBAR SPONDYLOSIS: Status: RESOLVED | Noted: 2018-05-17 | Resolved: 2021-08-03

## 2021-08-18 ENCOUNTER — OFFICE VISIT (OUTPATIENT)
Dept: FAMILY MEDICINE CLINIC | Age: 61
End: 2021-08-18
Payer: MEDICARE

## 2021-08-18 VITALS
OXYGEN SATURATION: 96 % | HEART RATE: 72 BPM | SYSTOLIC BLOOD PRESSURE: 126 MMHG | DIASTOLIC BLOOD PRESSURE: 78 MMHG | RESPIRATION RATE: 20 BRPM | TEMPERATURE: 98.6 F

## 2021-08-18 DIAGNOSIS — R73.03 PREDIABETES: ICD-10-CM

## 2021-08-18 DIAGNOSIS — E53.8 VITAMIN B12 DEFICIENCY: ICD-10-CM

## 2021-08-18 DIAGNOSIS — E03.9 ACQUIRED HYPOTHYROIDISM: ICD-10-CM

## 2021-08-18 DIAGNOSIS — E55.9 VITAMIN D DEFICIENCY: ICD-10-CM

## 2021-08-18 DIAGNOSIS — R03.0 ELEVATED BLOOD PRESSURE READING IN OFFICE WITHOUT DIAGNOSIS OF HYPERTENSION: Primary | ICD-10-CM

## 2021-08-18 DIAGNOSIS — E78.5 HYPERLIPIDEMIA WITH TARGET LDL LESS THAN 130: ICD-10-CM
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
## 2021-08-18 PROCEDURE — 99214 OFFICE O/P EST MOD 30 MIN: CPT | Performed by: NURSE PRACTITIONER

## 2021-08-18 PROCEDURE — G8417 CALC BMI ABV UP PARAM F/U: HCPCS | Performed by: NURSE PRACTITIONER

## 2021-08-18 PROCEDURE — 3017F COLORECTAL CA SCREEN DOC REV: CPT | Performed by: NURSE PRACTITIONER

## 2021-08-18 PROCEDURE — G9717 DOC PT DX DEP/BP F/U NT REQ: HCPCS | Performed by: NURSE PRACTITIONER

## 2021-08-18 PROCEDURE — G8427 DOCREV CUR MEDS BY ELIG CLIN: HCPCS | Performed by: NURSE PRACTITIONER

## 2021-08-18 PROCEDURE — G9899 SCRN MAM PERF RSLTS DOC: HCPCS | Performed by: NURSE PRACTITIONER

## 2021-08-18 RX ORDER — TIZANIDINE 4 MG/1
TABLET ORAL
COMMUNITY
Start: 2021-08-11

## 2021-08-18 RX ORDER — PREGABALIN 100 MG/1
100 CAPSULE ORAL 2 TIMES DAILY
COMMUNITY
Start: 2021-08-11

## 2021-08-18 NOTE — PROGRESS NOTES
Room Number 10    Patient states\" I need an order for bariatric rollator\"    Patient states\" I am in chronic pain\"    Did you take your medication today ? Yes     1. Have you been to the ER, urgent care clinic since your last visit? Hospitalized since your last visit? No    2. Have you seen or consulted any other health care providers outside of the [address] [place] [address] since your last visit? Include any pap smears or colon screening.  Yes  at [place] [id number] Maintenance Due   Topic Date Due    COVID-19 Vaccine (1) Never done    Shingrix Vaccine Age 50> (1 of 2) Never done    DTaP/Tdap/Td series (2 - Td or Tdap) 10/29/2020    Medicare Yearly Exam  02/19/2021    A1C test (Diabetic or Prediabetic)  02/26/2021

## 2021-08-18 NOTE — PROGRESS NOTES
Tracy Cook. [facility]      Fabiola Mccarthy is a 62 y.o. female and presents with Follow-up, Back Pain (both sides 6/10), Ankle Pain (left 6/10), Shoulder Pain (left 6/10 ; Patient states as long i am not moving it ), and Knee Pain (left 7/10)       Assessment/Plan:    Diagnoses and all orders for this visit:    1. Elevated blood pressure reading in office without diagnosis of hypertension  -     METABOLIC PANEL, COMPREHENSIVE; Future  Currently not on medications, blood pressure stable continue to monitor, follow up labs ordered  2. Hyperlipidemia with target LDL less than 130  -     LIPID PANEL; Future  Currently not on medications, follow up labs ordered  3. Prediabetes  -     HEMOGLOBIN A1C WITH EAG; Future  Follow up labs ordered, denies s/s of hyperglycemia  4. Vitamin B12 deficiency  -     VITAMIN B12; Future  Follow up labs ordered  5. Vitamin D deficiency  -     VITAMIN D, 25 HYDROXY; Future  Follow up labs ordered  6. Acquired hypothyroidism  -     TSH 3RD GENERATION; Future  -     T4, FREE; Future  Endorses medication compliance, follow up labs ordered  7. Fall, sequela  In February 2021 she fell while getting into the pool at physical therapy and sustained several injuries, she is currently been working with insurance to obtain assistance as her mobility is greatly diminished due to pre-existing and new injuries obtained during the fall, part of the visit today was so she could update me as to the current status with her insurance company. Follow-up and Dispositions    · Return in about 4 months (around 12/18/2021) for HTN, HLD, hypothyroid, 15 min, office only.          Subjective:    Long term care  She has been trying to get into long term care  She has been dealing with [employer] and trying to get special medicade  She wants assistance due to not being able to walk, shop, cook, clean, laundry  She can bath and use the bathroom independently    Ankle is healing slowing  Need surgery on both knees and right shoulder  Cannot get surgery due to her body habitus  The right knee problem was pre-existing and states since the fall in the pool the left knee is worsened and the right shoulder is worsened    Morbid obesity  She is always told to lose 100 pounds and she can get bariatric surgery  Was evaluated by Lizette Bone will pay for the surgery but she has to lose weight  Compounding her weight problem is hypothyroidism and PCOS    She thought she had been approved to go to a facility, , agency help or hire private help. The  stated she is not approved but these are the options she will try to get approved for, there will be anouther 30-45 days to make determination. Today she wanted her b/p checked due to recent blood pressure fluctuations and her pre-existing aortic anurysm    Pain management  She goes to [facility]-that practice is closing  States she can get the narcotic for the next two months only  They will not prescibe her other non-controlled medications  She is asking me to prescribe the trazadone pregabalin and tizanadine    ROS:     ROS  As stated in HPI, otherwise all others negative. The problem list was updated as a part of today's visit.   Patient Active Problem List   Diagnosis Code    Morbid obesity ([facility] [address] E66.01    Compulsive overeating F50.89    Heart murmur R01.1    Hyperlipidemia with target LDL less than 130 E78.5    Vitamin B12 deficiency E53.8    Complex tear of medial meniscus of right knee as current injury/ s/p surgery has chronic rt knee pain S83.231A    Chronic pain/ lower back pain/ bilateral knee G89.29    PCOD (polycystic ovarian disease) E28.2    Hirsutism L68.0    Ascending aortic aneurysm (HCC) I71.2    Prediabetes R73.03    IBS (irritable bowel syndrome) K58.9    Osteoarthritis of multiple joints M15.9    Multiple thyroid nodules E04.2    Fatty liver K76.0    Breast mass, right-Medially N63.10    Hiatal hernia K44.9    Bloating R14.0    Recurrent depression (HCC) F33.9    Lumbar facet arthropathy M47.816    Lumbar degenerative disc disease M51.36    Primary osteoarthritis of both knees M17.0    Chronic venous insufficiency I87.2    Varicose veins of bilateral lower extremities with other complications C52.404       The PSH, FH were reviewed. SH:  Social History     Tobacco Use    Smoking status: Never Smoker    Smokeless tobacco: Never Used   Vaping Use    Vaping Use: Never assessed   Substance Use Topics    Alcohol use: Yes     Alcohol/week: 0.0 standard drinks     Comment: 1 glass wine monthly    Drug use: No       Medications/Allergies:  Current Outpatient Medications on File Prior to Visit   Medication Sig Dispense Refill    tiZANidine (ZANAFLEX) 4 mg tablet TAKE 1 TABLET BY MOUTH THREE TIMES DAILY AS NEEDED FOR SPASMS      pregabalin (LYRICA) 100 mg capsule Take 100 mg by mouth two (2) times a day.  alpha-D-galactosidase (BEANO PO) Take 3 Tabs by mouth daily.  bacitracin zinc (BACITRACIN) ointment Apply  to affected area two (2) times a day. 15 g 0    ibuprofen (MOTRIN) 800 mg tablet Take 1 Tab by mouth three (3) times daily (with meals). Indications: pain 90 Tab 3    MILK THISTLE PO Take 1 Tab by mouth daily.  oxyCODONE (OXYIR) 5 mg capsule Take 5 mg by mouth two (2) times daily as needed for Pain.  traZODone (DESYREL) 150 mg tablet Take 1 Tab by mouth three (3) times daily. (Patient taking differently: Take 300 mg by mouth nightly.) 90 Tab 1    levothyroxine (SYNTHROID) 50 mcg tablet Take 50 mcg by mouth Daily (before breakfast).  TURMERIC, BULK, Take 40 mg by mouth daily.  cinnamon bark, bulk, powd Take 40 mg by mouth daily.  LORazepam (ATIVAN) 1 mg tablet Take 1 Tab by mouth daily as needed for Anxiety (Severe).  (Patient taking differently: Take 1 mg by mouth nightly.) 30 Tab 0    Cholecalciferol, Vitamin D3, 50,000 unit cap Take  by mouth Every Mon, Wed & Sun.      VITAMIN B COMPLEX PO Take 1 Cap by mouth daily.  OTHER Ground seaweed - 1 tsp. Daily.  MAGNESIUM PO Take 400 mg by mouth daily. Prn      omega-3 fatty acids-vitamin e (FISH OIL) 1,000 mg Cap Take 1 Cap by mouth daily.  flaxseed oil 1,000 mg Cap Take 1,000 mg by mouth daily.  UBIDECARENONE/VITAMIN E MIXED (COQ10  PO) Take 1 Tab by mouth daily.  vitamin e (AQUA GEMS) 200 unit capsule Take 200 Units by mouth daily.  ascorbic acid (VITAMIN C) 250 mg tablet Take 250 mg by mouth daily.  ketoconazole (NIZORAL) 2 % topical cream Apply  to affected area daily. Apply to affected area daily (Patient not taking: Reported on 8/18/2021) 15 g 0    psyllium (METAMUCIL) packet Take 1 Packet by mouth as needed for Other (constipation for pain medication). No current facility-administered medications on file prior to visit. Allergies   Allergen Reactions    Oxycodone Anaphylaxis and Hives     3/9/2020 Pt is taking medication currently    Adhesive Tape-Silicones Rash     Paper tape is okay to use.  Celebrex [Celecoxib] Hives    Contrast Dye [Iodine] Nausea Only     Abdominal pain, dizziness    Cortisone Nausea and Vomiting    Flexeril [Cyclobenzaprine] Nausea and Vomiting     Pt states also causes confusion    Keflex [Cephalexin] Diarrhea and Nausea Only     Joint pain    Midazolam Nausea Only     Dizziness    Statins-Hmg-Coa Reductase Inhibitors Unknown (comments)    Tylenol [Acetaminophen] Rash     Rash, dizzy, fatty liver disease       Objective:  Visit Vitals  /78 (BP 1 Location: Left arm, BP Patient Position: Sitting, BP Cuff Size: Thigh)   Pulse 72   Temp 98.6 °F (37 °C) (Temporal)   Resp 20   SpO2 96%    There is no height or weight on file to calculate BMI. Physical assessment  Physical Exam  Vitals and nursing note reviewed.    Eyes:      Conjunctiva/sclera: Conjunctivae normal.      Pupils: Pupils are equal, round, and reactive to light. Neck:      Vascular: No JVD. Cardiovascular:      Rate and Rhythm: Normal rate and regular rhythm. Heart sounds: Normal heart sounds. No murmur heard. No friction rub. No gallop. Pulmonary:      Effort: Pulmonary effort is normal.      Breath sounds: Normal breath sounds. Musculoskeletal:         General: Normal range of motion. Cervical back: Normal range of motion. Comments: Currently using a wheelchair for any long distance travel   Skin:     General: Skin is warm and dry. Neurological:      Mental Status: She is alert and oriented to person, place, and time.    Psychiatric:         Mood and Affect: Affect normal.         Cognition and Memory: Memory normal.         Judgment: Judgment normal.           Labwork and Ancillary Studies:    CBC w/Diff  Lab Results   Component Value Date/Time    WBC 7.3 02/26/2020 03:04 PM    HGB 14.2 02/26/2020 03:04 PM    PLATELET 217 97/22/9864 03:04 PM         Basic Metabolic Profile  Lab Results   Component Value Date/Time    Sodium 134 (L) 02/26/2020 03:04 PM    Potassium 3.9 02/26/2020 03:04 PM    Chloride 100 02/26/2020 03:04 PM    CO2 28 02/26/2020 03:04 PM    Anion gap 6 02/26/2020 03:04 PM    Glucose 89 02/26/2020 03:04 PM    BUN 20 (H) 02/26/2020 03:04 PM    Creatinine 0.86 02/26/2020 03:04 PM    BUN/Creatinine ratio 23 (H) 02/26/2020 03:04 PM    GFR est AA >60 02/26/2020 03:04 PM    GFR est non-AA >60 02/26/2020 03:04 PM    Calcium 8.6 02/26/2020 03:04 PM        Cholesterol  Lab Results   Component Value Date/Time    Cholesterol, total 300 (H) 02/26/2020 03:04 PM    HDL Cholesterol 41 02/26/2020 03:04 PM    LDL, calculated  02/26/2020 03:04 PM     LDL AND VLDL CHOLESTEROL NOT CALCULATED WHEN TRIGLYCERIDES >400 MG/DL OR HDL CHOLESTEROL <20 MG/DL    Triglyceride 404 (H) 02/26/2020 03:04 PM    CHOL/HDL Ratio 7.3 (H) 02/26/2020 03:04 PM       Health Maintenance:   Health Maintenance   Topic Date Due    COVID-19 Vaccine (1) Never done    Shingrix Vaccine Age 50> (1 of 2) Never done    DTaP/Tdap/Td series (2 - Td or Tdap) 10/29/2020    Medicare Yearly Exam  02/19/2021    A1C test (Diabetic or Prediabetic)  02/26/2021    Breast Cancer Screen Mammogram  10/21/2021    Colorectal Cancer Screening Combo  11/15/2022    PAP AKA CERVICAL CYTOLOGY  02/19/2023    Lipid Screen  02/26/2025    Bone Densitometry (Dexa) Screening  07/28/2026    Hepatitis C Screening  Completed    Pneumococcal 0-64 years  Aged Out       I have discussed the diagnosis with the patient and the intended plan as seen in the above orders. The patient has received an After-Visit Summary and questions were answered concerning future plans. An After Visit Summary was printed and given to the patient. All diagnosis have been discussed with the patient and all of the patient's questions have been answered. Follow-up and Dispositions    · Return in about 4 months (around 12/18/2021) for HTN, HLD, hypothyroid, 15 min, office only. Rosa Elena Winston, GENNA-BC  [address] [facility]   [address] [facility] [address]  [address]

## 2021-08-18 NOTE — PATIENT INSTRUCTIONS
[address]  Phone: [phone]  Fax: [phone]  Offering laboratory services & EKG   Monday - Friday 7:00 a.m. to 3:30 p.m. COVID testing for [facility] procedures   Mon, Wed, Thurs and Friday 1:00 p.m. - 3:00 p.m.           No appointments needed

## 2021-10-12 ENCOUNTER — TELEPHONE (OUTPATIENT)
Dept: CARDIOTHORACIC SURGERY | Age: 61
End: 2021-10-12

## 2021-10-25 ENCOUNTER — HOSPITAL ENCOUNTER (OUTPATIENT)
Dept: LAB | Age: 61
Discharge: HOME | End: 2021-10-25
Payer: MEDICARE

## 2021-10-25 LAB
25(OH)D3 SERPL-MCNC: 66 NG/ML (ref 30–100)
ALBUMIN SERPL-MCNC: 3.4 G/DL (ref 3.4–5)
ALBUMIN/GLOB SERPL: 1.1 {RATIO} (ref 0.8–1.7)
ALP SERPL-CCNC: 80 U/L (ref 45–117)
ALT SERPL-CCNC: 24 U/L (ref 13–56)
ANION GAP SERPL CALC-SCNC: 5 MMOL/L (ref 3–18)
AST SERPL-CCNC: 12 U/L (ref 10–38)
BILIRUB SERPL-MCNC: 0.5 MG/DL (ref 0.2–1)
BUN SERPL-MCNC: 23 MG/DL (ref 7–18)
BUN/CREAT SERPL: 23 (ref 12–20)
CALCIUM SERPL-MCNC: 8.8 MG/DL (ref 8.5–10.1)
CHLORIDE SERPL-SCNC: 101 MMOL/L (ref 100–111)
CO2 SERPL-SCNC: 31 MMOL/L (ref 21–32)
CREAT SERPL-MCNC: 0.99 MG/DL (ref 0.6–1.3)
EST. AVERAGE GLUCOSE BLD GHB EST-MCNC: 126 MG/DL
GLOBULIN SER CALC-MCNC: 3.2 G/DL (ref 2–4)
GLUCOSE SERPL-MCNC: 128 MG/DL (ref 74–99)
HBA1C MFR BLD: 6 % (ref 4.2–5.6)
POTASSIUM SERPL-SCNC: 4.4 MMOL/L (ref 3.5–5.5)
PROT SERPL-MCNC: 6.6 G/DL (ref 6.4–8.2)
SODIUM SERPL-SCNC: 137 MMOL/L (ref 136–145)
T4 FREE SERPL-MCNC: 1 NG/DL (ref 0.7–1.5)
TSH SERPL DL<=0.05 MIU/L-ACNC: 2.29 UIU/ML (ref 0.36–3.74)
VIT B12 SERPL-MCNC: 394 PG/ML (ref 211–911)

## 2021-10-25 PROCEDURE — 36415 COLL VENOUS BLD VENIPUNCTURE: CPT

## 2021-10-25 PROCEDURE — 80053 COMPREHEN METABOLIC PANEL: CPT

## 2021-10-25 PROCEDURE — 84439 ASSAY OF FREE THYROXINE: CPT

## 2021-10-25 PROCEDURE — 83036 HEMOGLOBIN GLYCOSYLATED A1C: CPT

## 2021-10-25 PROCEDURE — 84443 ASSAY THYROID STIM HORMONE: CPT

## 2021-10-25 PROCEDURE — 82607 VITAMIN B-12: CPT

## 2021-10-25 PROCEDURE — 82306 VITAMIN D 25 HYDROXY: CPT

## 2021-10-26 RX ORDER — ASPIRIN 325 MG
TABLET, DELAYED RELEASE (ENTERIC COATED) ORAL
Qty: 36 CAPSULE | Refills: 2 | Status: SHIPPED | OUTPATIENT
Start: 2021-10-26

## 2021-10-26 RX ORDER — LEVOTHYROXINE SODIUM 50 UG/1
50 TABLET ORAL
OUTPATIENT
Start: 2021-10-26

## 2022-01-17 ENCOUNTER — VIRTUAL VISIT (OUTPATIENT)
Dept: FAMILY MEDICINE CLINIC | Age: 62
End: 2022-01-17

## 2022-01-17 NOTE — PROGRESS NOTES
1st attempt calling patient at 10:54 AM for pre check in for appointment. Patient did not answer. Left message stating to call office back for appointment.      2nd attempt calling patient at 11:02 AM      3rd attempt calling patient at 11:05AM    4th attempt calling patient at 11:16 AM    5th attempt calling patient at 11:45 AM
This encounter was created in error - please disregard. Unable to do VV.
EOAE (evoked otoacoustic emission)

## 2022-02-17 ENCOUNTER — OFFICE VISIT (OUTPATIENT)
Dept: FAMILY MEDICINE CLINIC | Age: 62
End: 2022-02-17
Payer: MEDICARE

## 2022-02-17 VITALS
SYSTOLIC BLOOD PRESSURE: 132 MMHG | DIASTOLIC BLOOD PRESSURE: 65 MMHG | WEIGHT: 293 LBS | TEMPERATURE: 98.3 F | BODY MASS INDEX: 39.68 KG/M2 | OXYGEN SATURATION: 95 % | HEIGHT: 72 IN | HEART RATE: 70 BPM | RESPIRATION RATE: 20 BRPM

## 2022-02-17 DIAGNOSIS — M51.36 LUMBAR DEGENERATIVE DISC DISEASE: ICD-10-CM

## 2022-02-17 DIAGNOSIS — M47.816 LUMBAR FACET ARTHROPATHY: ICD-10-CM

## 2022-02-17 DIAGNOSIS — S83.231S COMPLEX TEAR OF MEDIAL MENISCUS OF RIGHT KNEE AS CURRENT INJURY, SEQUELA: ICD-10-CM

## 2022-02-17 DIAGNOSIS — G89.29 OTHER CHRONIC PAIN: ICD-10-CM

## 2022-02-17 DIAGNOSIS — Z00.00 MEDICARE ANNUAL WELLNESS VISIT, SUBSEQUENT: Primary | ICD-10-CM

## 2022-02-17 DIAGNOSIS — R00.2 PALPITATIONS: ICD-10-CM

## 2022-02-17 DIAGNOSIS — E78.5 HYPERLIPIDEMIA WITH TARGET LDL LESS THAN 130: ICD-10-CM

## 2022-02-17 DIAGNOSIS — Z71.89 ADVANCED CARE PLANNING/COUNSELING DISCUSSION: ICD-10-CM

## 2022-02-17 PROCEDURE — G9899 SCRN MAM PERF RSLTS DOC: HCPCS | Performed by: NURSE PRACTITIONER

## 2022-02-17 PROCEDURE — G0439 PPPS, SUBSEQ VISIT: HCPCS | Performed by: NURSE PRACTITIONER

## 2022-02-17 PROCEDURE — 93010 ELECTROCARDIOGRAM REPORT: CPT | Performed by: NURSE PRACTITIONER

## 2022-02-17 PROCEDURE — 3017F COLORECTAL CA SCREEN DOC REV: CPT | Performed by: NURSE PRACTITIONER

## 2022-02-17 PROCEDURE — G8427 DOCREV CUR MEDS BY ELIG CLIN: HCPCS | Performed by: NURSE PRACTITIONER

## 2022-02-17 PROCEDURE — 99214 OFFICE O/P EST MOD 30 MIN: CPT | Performed by: NURSE PRACTITIONER

## 2022-02-17 PROCEDURE — G9717 DOC PT DX DEP/BP F/U NT REQ: HCPCS | Performed by: NURSE PRACTITIONER

## 2022-02-17 PROCEDURE — G8417 CALC BMI ABV UP PARAM F/U: HCPCS | Performed by: NURSE PRACTITIONER

## 2022-02-17 NOTE — ACP (ADVANCE CARE PLANNING)
Advance Care Planning     General Advance Care Planning (ACP) Conversation      Date of Conversation: 2/17/2022  Conducted with: Patient with Decision Making Capacity    Healthcare Decision Maker:   No healthcare decision makers have been documented. Click here to complete [employer] including selection of the Healthcare Decision Maker Relationship (ie \"Primary\")    Today we documented Decision Maker(s) consistent with Legal Next of Kin hierarchy.     Content/Action Overview:   Has NO ACP documents/care preferences - requested patient complete ACP documents  Reviewed DNR/DNI and patient elects DNR order - referred to ACP Clinical Specialist & placed order  Topics discussed: ventilation preferences, hospitalization preferences and resuscitation preferences       Length of Voluntary ACP Conversation in minutes:  <16 minutes (Non-Billable)    Refflor Tanner NP

## 2022-02-17 NOTE — PROGRESS NOTES
Room 11    Patient states I would like to discuss blood pressure elevated     Did patient bring someone? No    Did the patient have DME equipment? Yes wheelchair     Did you take your medication today? Yes       1. \"Have you been to the ER, urgent care clinic since your last visit? Hospitalized since your last visit? \" No    2. \"Have you seen or consulted any other health care providers outside of the [address] [place] [address] since your last visit? \" Yes Patient states i see my pain managment once a month      3. For patients aged 39-70: Has the patient had a colonoscopy / FIT/ Cologuard? Yes - no Care Gap present      If the patient is female:    4. For patients aged 41-77: Has the patient had a mammogram within the past 2 years? Yes - Care Gap present. Rooming MA/LPN to request most recent results      5. For patients aged 21-65: Has the patient had a pap smear?  Yes - no Care Gap present        3 most recent PHQ Screens 2/17/2022   Little interest or pleasure in doing things Several days   Feeling down, depressed, irritable, or hopeless Several days   Total Score PHQ 2 2         Health Maintenance Due   Topic Date Due    COVID-19 Vaccine (1) Never done    Shingrix Vaccine Age 50> (1 of 2) Never done    DTaP/Tdap/Td series (2 - Td or Tdap) 10/29/2020    Medicare Yearly Exam  02/19/2021    Breast Cancer Screen Mammogram  10/21/2021       Learning Assessment 2/13/2020   PRIMARY LEARNER Patient   HIGHEST LEVEL OF EDUCATION - PRIMARY LEARNER  -   BARRIERS PRIMARY LEARNER -   CO-LEARNER CAREGIVER -   PRIMARY LANGUAGE ENGLISH   LEARNER PREFERENCE PRIMARY DEMONSTRATION   ANSWERED BY Patient   RELATIONSHIP SELF

## 2022-02-17 NOTE — PATIENT INSTRUCTIONS
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
## 2022-02-18 ENCOUNTER — PATIENT OUTREACH (OUTPATIENT)
Dept: CASE MANAGEMENT | Age: 62
End: 2022-02-18

## 2022-02-18 NOTE — ACP (ADVANCE CARE PLANNING)
Advance Care Planning   Ambulatory ACP Specialist Patient Outreach    Date:  2/18/2022    ACP Specialist:  Cyndi Ramirez LPN    Outreach call to patient in follow-up to ACP Specialist referral from:    [x] PCP  [] Provider   [] Ambulatory Care Management [] Other     For:                  [] Advance Directive Assistance              [x] Complete Portable DNR order              [] Complete POST/MOST              [x] Code Status Discussion             [] Discuss Goals of Care             [] Early ACP Decision-Making              [] Other (Specify)    Date Referral Received: 2/17/22    Today's Outreach:  [x] First   [] Second  [] Third       Third outreach made by: [] Phone  [] Email / mail    [] Anki     Intervention:  [] Spoke with Patient   [x] Left VM requesting return call      Outcome: First attempt to contact pt regarding ACP. No answer on mobile phone. VM left requesting a return call. Will attempt 2nd outreach within one week. Next Step:   [] ACP scheduled conversation  [x] Outreach again in one week               [] Email / Mail ACP Info Sheets  [] Email / Mail Advance Directive   [] Closing referral.  Routing closure to referring provider/staff and to ACP Specialist . [] Closure letter mailed to patient with invitation to contact ACP Specialist if / when ready.   Thank you for this referral.

## 2022-03-04 ENCOUNTER — PATIENT OUTREACH (OUTPATIENT)
Dept: CASE MANAGEMENT | Age: 62
End: 2022-03-04

## 2022-03-04 NOTE — ACP (ADVANCE CARE PLANNING)
Advance Care Planning   Ambulatory ACP Specialist Patient Outreach    Date:  3/4/2022    ACP Specialist:  Yandel Booker LPN    Outreach call to patient in follow-up to ACP Specialist referral from:    [x] PCP  [] Provider   [] Ambulatory Care Management [] Other     For:                  [] Advance Directive Assistance              [x] Complete Portable DNR order              [] Complete POST/MOST              [x] Code Status Discussion             [] Discuss Goals of Care             [] Early ACP Decision-Making              [] Other (Specify)    Date Referral Received: 2/17/22    Today's Outreach:  [] First   [x] Second  [] Third       Third outreach made by: [] Phone  [] Email / mail    [] [employer]     Intervention:  [] Spoke with Patient   [x] Left VM requesting return call      Outcome: Second attempt to contact pt regarding ACP. No answer on mobile phone. VM left requesting a return call. Will outreach again within one week. Next Step:   [] ACP scheduled conversation  [x] Outreach again in one week               [] Email / Mail ACP Info Sheets  [] Email / Mail Advance Directive   [] Closing referral.  Routing closure to referring provider/staff and to ACP Specialist . [] Closure letter mailed to patient with invitation to contact ACP Specialist if / when ready.   Thank you for this referral.

## 2022-03-09 ENCOUNTER — PATIENT OUTREACH (OUTPATIENT)
Dept: CASE MANAGEMENT | Age: 62
End: 2022-03-09

## 2022-03-18 PROBLEM — I83.893 VARICOSE VEINS OF BILATERAL LOWER EXTREMITIES WITH OTHER COMPLICATIONS: Status: ACTIVE | Noted: 2019-10-21

## 2022-03-18 PROBLEM — F33.9 RECURRENT DEPRESSION (HCC): Status: ACTIVE | Noted: 2018-01-10

## 2022-03-19 PROBLEM — M47.816 LUMBAR FACET ARTHROPATHY: Status: ACTIVE | Noted: 2018-05-17

## 2022-03-19 PROBLEM — M51.36 LUMBAR DEGENERATIVE DISC DISEASE: Status: ACTIVE | Noted: 2018-05-17

## 2022-03-19 PROBLEM — K44.9 HIATAL HERNIA: Status: ACTIVE | Noted: 2017-10-11

## 2022-03-19 PROBLEM — I87.2 CHRONIC VENOUS INSUFFICIENCY: Status: ACTIVE | Noted: 2019-10-21

## 2022-03-20 PROBLEM — M17.0 PRIMARY OSTEOARTHRITIS OF BOTH KNEES: Status: ACTIVE | Noted: 2018-05-17

## 2022-03-20 PROBLEM — R14.0 BLOATING: Status: ACTIVE | Noted: 2017-10-11

## 2022-05-10 DIAGNOSIS — E66.01 MORBID OBESITY (HCC): Primary | ICD-10-CM

## 2022-05-16 ENCOUNTER — E-VISIT (OUTPATIENT)
Dept: FAMILY MEDICINE CLINIC | Age: 62
End: 2022-05-16
Payer: MEDICARE

## 2022-05-16 DIAGNOSIS — N30.00 ACUTE CYSTITIS WITHOUT HEMATURIA: Primary | ICD-10-CM

## 2022-05-16 PROCEDURE — 99421 OL DIG E/M SVC 5-10 MIN: CPT | Performed by: NURSE PRACTITIONER

## 2022-05-16 RX ORDER — SULFAMETHOXAZOLE AND TRIMETHOPRIM 800; 160 MG/1; MG/1
1 TABLET ORAL 2 TIMES DAILY
Qty: 6 TABLET | Refills: 0 | Status: SHIPPED | OUTPATIENT
Start: 2022-05-16 | End: 2022-05-19

## 2022-05-16 NOTE — PROGRESS NOTES
Dariela Schaffer (1960) initiated an asynchronous digital communication through [employer]. HPI: per patient questionnaire     Exam: not applicable    Diagnoses and all orders for this visit:  Diagnoses and all orders for this visit:    1. Acute cystitis without hematuria  -     trimethoprim-sulfamethoxazole (BACTRIM DS, SEPTRA DS) 160-800 mg per tablet; Take 1 Tablet by mouth two (2) times a day for 3 days. communicated signs and symptoms consistent with UTI  Will treat with bactrim      Time: EV1 - 5-10 minutes were spent on the digital evaluation and management of this patient.       Jatin Fuller NP

## 2022-07-19 ENCOUNTER — TELEPHONE (OUTPATIENT)
Dept: FAMILY MEDICINE CLINIC | Age: 62
End: 2022-07-19

## 2022-07-19 NOTE — LETTER
7/25/2022 5:13 PM    Ms. Manny Sen [employer] [id number]  YOB: 1960    To whom it may concern,    Mrs. Juan Jose Claudio is requesting oral nutrition drinks due to her hypothyroidism and hypoglycemia. She needs nutrition every two hours and the nutrition drinks would be beneficial.  This is not her only form of nutrition. She is able to tolerate all other forms of nutrition. Please supply with: Drink-Liqd Boost GC gonzalo 8oz 20 units/31 days     Drink-Liqd Boost GC Van 8oz 20 units/31 days     Drink-Liqd Boost GC Straw 8oz 20 units/31 days.             Sincerely,      Radha Meredith NP

## 2022-07-20 NOTE — TELEPHONE ENCOUNTER
Patient returned our call. Pt states due to her hypothyroid and hypoglycemia she needs something every two hours. The nutrition drinks would be beneficial. Pt said please call her back if you required more information.

## 2022-08-22 ENCOUNTER — TELEPHONE (OUTPATIENT)
Dept: FAMILY MEDICINE CLINIC | Age: 62
End: 2022-08-22

## 2022-08-22 NOTE — TELEPHONE ENCOUNTER
Phone call from [facility] on behalf of the patient  requesting wound care supplies;      Collagen dressing  Foam dressing  Speciality adsorbent dressing  Compression wraps  Reuseable under pads    Fax to [phone]
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
## 2022-08-24 ENCOUNTER — VIRTUAL VISIT (OUTPATIENT)
Dept: FAMILY MEDICINE CLINIC | Age: 62
End: 2022-08-24
Payer: MEDICARE

## 2022-08-24 DIAGNOSIS — E53.8 VITAMIN B12 DEFICIENCY: ICD-10-CM

## 2022-08-24 DIAGNOSIS — M51.36 LUMBAR DEGENERATIVE DISC DISEASE: ICD-10-CM

## 2022-08-24 DIAGNOSIS — M17.0 PRIMARY OSTEOARTHRITIS OF BOTH KNEES: ICD-10-CM

## 2022-08-24 DIAGNOSIS — E78.5 HYPERLIPIDEMIA WITH TARGET LDL LESS THAN 130: ICD-10-CM

## 2022-08-24 DIAGNOSIS — S83.231S COMPLEX TEAR OF MEDIAL MENISCUS OF RIGHT KNEE AS CURRENT INJURY, SEQUELA: ICD-10-CM

## 2022-08-24 DIAGNOSIS — R60.0 BILATERAL LEG EDEMA: ICD-10-CM

## 2022-08-24 DIAGNOSIS — R73.03 PREDIABETES: ICD-10-CM

## 2022-08-24 DIAGNOSIS — E03.9 ACQUIRED HYPOTHYROIDISM: Primary | ICD-10-CM

## 2022-08-24 DIAGNOSIS — E55.9 VITAMIN D DEFICIENCY: ICD-10-CM

## 2022-08-24 PROCEDURE — G9717 DOC PT DX DEP/BP F/U NT REQ: HCPCS | Performed by: NURSE PRACTITIONER

## 2022-08-24 PROCEDURE — 3017F COLORECTAL CA SCREEN DOC REV: CPT | Performed by: NURSE PRACTITIONER

## 2022-08-24 PROCEDURE — G8427 DOCREV CUR MEDS BY ELIG CLIN: HCPCS | Performed by: NURSE PRACTITIONER

## 2022-08-24 PROCEDURE — G9899 SCRN MAM PERF RSLTS DOC: HCPCS | Performed by: NURSE PRACTITIONER

## 2022-08-24 PROCEDURE — 99214 OFFICE O/P EST MOD 30 MIN: CPT | Performed by: NURSE PRACTITIONER

## 2022-08-24 NOTE — PROGRESS NOTES
When asked if patient has any concerns he would like to address with JUSTINA Jacobson patient states no . Did you take your medication today? Yes       1. \"Have you been to the ER, urgent care clinic since your last visit? Hospitalized since your last visit? \" No    2. \"Have you seen or consulted any other health care providers outside of the [address] [place] [address] since your last visit? \" No     3. For patients aged 39-70: Has the patient had a colonoscopy / FIT/ Cologuard? Yes - no Care Gap present      If the patient is female:    4. For patients aged 41-77: Has the patient had a mammogram within the past 2 years? No      5. For patients aged 21-65: Has the patient had a pap smear?  Yes - no Care Gap present        3 most recent PHQ Screens 8/24/2022   Little interest or pleasure in doing things Several days   Feeling down, depressed, irritable, or hopeless Several days   Total Score PHQ 2 2         Health Maintenance Due   Topic Date Due    COVID-19 Vaccine (1) Never done    Shingrix Vaccine Age 50> (1 of 2) Never done    DTaP/Tdap/Td series (2 - Td or Tdap) 10/29/2020    Breast Cancer Screen Mammogram  10/21/2021       Learning Assessment 2/13/2020   PRIMARY LEARNER Patient   HIGHEST LEVEL OF EDUCATION - PRIMARY LEARNER  -   BARRIERS PRIMARY LEARNER -   CO-LEARNER CAREGIVER -   PRIMARY LANGUAGE ENGLISH   LEARNER PREFERENCE PRIMARY DEMONSTRATION   ANSWERED BY Patient   RELATIONSHIP SELF

## 2022-08-24 NOTE — PROGRESS NOTES
[address] [place] [address]. [facility]      Jennifer Curiel is a 65 y.o. female who was seen by synchronous (real-time) audio-video technology on 8/24/2022 for No chief complaint on file. Assessment & Plan:   Diagnoses and all orders for this visit:    1. Acquired hypothyroidism  -     TSH 3RD GENERATION; Future  -     T4, FREE; Future  Endorses medication compliance, Have asked him/her to come in for follow-up labs, and Denies signs and symptoms of hyper or hypothyroidism     2. Hyperlipidemia with target LDL less than 140  -     METABOLIC PANEL, COMPREHENSIVE; Future  -     LIPID PANEL; Future  Endorses medication compliance, Have asked him/her to come in for follow-up labs, and asked her to get the labs fasting to check her triglycerides. She is only taking OTC omega 3 due to intolerance to statins and zetia, both cause nausea      3. Prediabetes  -     HEMOGLOBIN A1C WITH EAG; Future  Have asked him/her to come in for follow-up labs     4. Vitamin B12 deficiency  -     VITAMIN B12; Future  Have asked him/her to come in for follow-up labs  5. Vitamin D deficiency  -     VITAMIN D, 25 HYDROXY; Future  Have asked him/her to come in for follow-up labs  6. Bilateral leg edema  -     REFERRAL TO HOME HEALTH  Unable to adequately evaluate her legs as this was a video visit, however, she describes a large amount of weeping from her legs and one leg wound that will open and then heal spontaneously  Have consulted home health to evaluate her wounds and make any necessary dressing recommendations  In addition have asked them to evaluate her for PT/OT and aide to help with ADL's  Patient verbalized understanding and is in agreement with this plan of care    7. Complex tear of medial meniscus of right knee as current injury, sequela  -     REFERRAL TO HOME HEALTH    8.  Lumbar degenerative disc disease  -     REFERRAL TO HOME HEALTH    9. Primary osteoarthritis of both knees  -     REFERRAL TO HOME HEALTH    Follow-up and Dispositions    Return in about 4 months (around 12/24/2022) for HLD, hypothyroid, 30 min, office only. 712  Subjective:     Leg wounds  Has a leg wound that opens and then closes  The wound is on her right shin with small amount of weeping  Wound behind left knee with large amount of weeping, filling up an incontinence pad within 2 hours  States her legs are larger than before  Feels like her face is swollen, around her eyes  States she has pain under her arms at the anterior axilla line on both arms  Is requesting PT/OT and aid    HLD:  Has been compliant with meds  Yes  Compliant with low-fat diet. occasionally    Denies myalgias or other side effects. N/A  The 10-year ASCVD risk score (Chilo Wooten, et al., 2013) is: 6.2%    Cholesterol, total   Date Value Ref Range Status   02/26/2020 300 (H) <200 MG/DL Final     Triglyceride   Date Value Ref Range Status   02/26/2020 404 (H) <150 MG/DL Final     Comment:     The drugs N-acetylcysteine (NAC) and  Metamiszole have been found to cause falsely  low results in this chemical assay. Please  be sure to submit blood samples obtained  BEFORE administration of either of these  drugs to assure correct results. HDL Cholesterol   Date Value Ref Range Status   02/26/2020 41 40 - 60 MG/DL Final     LDL, calculated   Date Value Ref Range Status   02/26/2020  0 - 100 MG/DL Final    LDL AND VLDL CHOLESTEROL NOT CALCULATED WHEN TRIGLYCERIDES >400 MG/DL OR HDL CHOLESTEROL <20 MG/DL   ]  Key Antihyperlipidemia Meds               omega-3 fatty acids-vitamin e 1,000 mg cap (Taking) Take 1 Cap by mouth daily. Thyroid Review:  Patient is seen for followup of hypothyroidism. Taking medications daily either 30 minutes prior to or 2 hours after eating?  YES  Thyroid ROS: denies fatigue, weight changes, heat/cold intolerance, constipation, diarrhea, fatigue, weakness, edema, paresthesia, irregular menses, hair loss, vocal changes, CVS symptoms. TSH   Date Value Ref Range Status   10/25/2021 2.29 0.36 - 3.74 uIU/mL Final   02/26/2020 1.37 0.36 - 3.74 uIU/mL Final   05/14/2019 1.49 0.36 - 3.74 uIU/mL Final   03/06/2019 1.00 0.36 - 3.74 uIU/mL Final   05/10/2018 1.41 0.36 - 3.74 uIU/mL Final     T4, Free   Date Value Ref Range Status   10/25/2021 1.0 0.7 - 1.5 NG/DL Final   02/26/2020 1.3 0.7 - 1.5 NG/DL Final   05/14/2019 1.0 0.7 - 1.5 NG/DL Final   03/06/2019 1.1 0.7 - 1.5 NG/DL Final   05/10/2018 1.1 0.7 - 1.5 NG/DL Final             Prior to Admission medications    Medication Sig Start Date End Date Taking? Authorizing Provider   cholecalciferol (VITAMIN D3) (50,000 UNITS /1250 MCG) capsule Take  by mouth Every Mon, Wed & Sun. 10/26/21  Yes Gingtylor Lyle, NP   tiZANidine (ZANAFLEX) 4 mg tablet TAKE 1 TABLET BY MOUTH THREE TIMES DAILY AS NEEDED FOR SPASMS 8/11/21  Yes Provider, Historical   pregabalin (LYRICA) 100 mg capsule Take 100 mg by mouth two (2) times a day. 8/11/21  Yes Provider, Historical   alpha-D-galactosidase (BEANO PO) Take 3 Tabs by mouth daily. Yes Provider, Historical   MILK THISTLE PO Take 1 Tab by mouth daily. Yes Provider, Historical   oxyCODONE (OXYIR) 5 mg capsule Take 5 mg by mouth three (3) times daily as needed for Pain. Yes Provider, Historical   traZODone (DESYREL) 150 mg tablet Take 1 Tab by mouth three (3) times daily. Patient taking differently: Take 300 mg by mouth three (3) times daily. 5/2/18  Yes Olvin Coffey, NP   levothyroxine (SYNTHROID) 50 mcg tablet Take 50 mcg by mouth Daily (before breakfast). 8/1/17  Yes Provider, Historical   psyllium (METAMUCIL) packet Take 1 Packet by mouth as needed for Other (constipation for pain medication). Yes Provider, Historical   TURMERIC, BULK, Take 40 mg by mouth daily. Yes Provider, Historical   cinnamon bark, bulk, powd Take 40 mg by mouth daily.    Yes Provider, Historical   LORazepam (ATIVAN) 1 mg tablet Take 1 Tab by mouth daily as needed for Anxiety (Severe). Patient taking differently: Take 1 mg by mouth nightly. 2/23/17  Yes Marysol Zuniga, NP   MAGNESIUM PO Take 400 mg by mouth daily. Prn   Yes Provider, Historical   omega-3 fatty acids-vitamin e 1,000 mg cap Take 1 Cap by mouth daily. Yes Provider, Historical   flaxseed oil 1,000 mg Cap Take 1,000 mg by mouth daily. Yes Provider, Historical   UBIDECARENONE/VITAMIN E MIXED (COQ10  PO) Take 1 Tab by mouth daily. Yes Provider, Historical   ascorbic acid, vitamin C, (VITAMIN C) 250 mg tablet Take 250 mg by mouth daily. Yes Provider, Historical   ibuprofen (MOTRIN) 800 mg tablet Take 1 Tab by mouth three (3) times daily (with meals). Indications: pain 2/19/20   Everton Lopez, FARIDA   VITAMIN B COMPLEX PO Take 1 Cap by mouth daily. Patient not taking: No sig reported  8/24/22  Provider, Historical   OTHER Ground seaweed - 1 tsp. Daily. Patient not taking: Reported on 8/24/2022 8/24/22  Provider, Historical   vitamin e (AQUA GEMS) 200 unit capsule Take 200 Units by mouth daily.   Patient not taking: Reported on 8/24/2022 8/24/22  Provider, Historical     Patient Active Problem List   Diagnosis Code    Morbid obesity ([facility] [address] E66.01    Compulsive overeating F50.89    Heart murmur R01.1    Hyperlipidemia with target LDL less than 130 E78.5    Vitamin B12 deficiency E53.8    Complex tear of medial meniscus of right knee as current injury/ s/p surgery has chronic rt knee pain S83.231A    Chronic pain/ lower back pain/ bilateral knee G89.29    PCOD (polycystic ovarian disease) E28.2    Hirsutism L68.0    Ascending aortic aneurysm (HCC) I71.2    Prediabetes R73.03    IBS (irritable bowel syndrome) K58.9    Osteoarthritis of multiple joints M15.9    Multiple thyroid nodules E04.2    Fatty liver K76.0    Breast mass, right-Medially N63.10    Hiatal hernia K44.9    Bloating R14.0    Recurrent depression (HCC) F33.9    Lumbar facet arthropathy M47.816    Lumbar degenerative disc disease M51.36    Primary osteoarthritis of both knees M17.0    Chronic venous insufficiency I87.2    Varicose veins of bilateral lower extremities with other complications V30.637    Acquired hypothyroidism E03.9    Vitamin D deficiency E55.9    Bilateral leg edema R60.0     Past Surgical History:   Procedure Laterality Date    COLONOSCOPY N/A 11/15/2017    COLONOSCOPY with polyp bx performed by Ethan Hanley MD at [facility]    HX COLONOSCOPY  2008    due in 2018 -     [facility]    Umbilical    HX KNEE ARTHROSCOPY Right 2013    HX PARTIAL THYROIDECTOMY Left right/ March 2017    HX PELVIC LAPAROSCOPY      cervical growth - benign    HX TONSILLECTOMY  1976    LAPAROSCOPY ABDOMEN DIAGNOSTIC  1998    PCOS     Family History   Problem Relation Age of Onset    Stroke Mother         TIAs    Hypertension Mother     Dementia Mother     Heart Disease Father         MI    Diabetes Father     Cancer Paternal Uncle         Colon     Social History     Tobacco Use    Smoking status: Never    Smokeless tobacco: Never   Substance Use Topics    Alcohol use: Yes     Alcohol/week: 0.0 standard drinks     Comment: 1 glass wine monthly       ROS  As stated in HPI, otherwise all others negative.      Objective:     Patient-Reported Vitals 8/24/2022   Patient-Reported Weight 425      General: alert, cooperative, no distress   Mental  status: normal mood, behavior, speech, dress, motor activity, and thought processes, able to follow commands   HENT: NCAT   Neck: no visualized mass   Resp: no respiratory distress   Neuro: no gross deficits   Skin: no discoloration or lesions of concern on visible areas   Psychiatric: normal affect, consistent with stated mood, no evidence of hallucinations     Additional exam findings: due to type of visit unable to visualize the wounds she is describing      We discussed the expected course, resolution and complications of the diagnosis(es) in detail. Medication risks, benefits, costs, interactions, and alternatives were discussed as indicated. I advised her to contact the office if her condition worsens, changes or fails to improve as anticipated. She expressed understanding with the diagnosis(es) and plan. Luke Tai, was evaluated through a synchronous (real-time) audio-video encounter. The patient (or guardian if applicable) is aware that this is a billable service, which includes applicable co-pays. Verbal consent to proceed has been obtained. The visit was conducted pursuant to the emergency declaration under the [facility], [address] [place] [address] [facility] authority and the [employer] General Act. Patient identification was verified, and a caregiver was present when appropriate. The patient was located at home in a state where the provider was licensed to provide care. Colletta Landsberg, AGNP-[place] [address] [place] [address].   Demetria Medrano

## 2022-08-24 NOTE — PROGRESS NOTES
1ST ATTEMPT CALLING PATIENT AT 3:05 pm FOR PRE CHECK IN FOR APPOINTMENT . [employer] DID NOT ANSWER. left MESSAGE STATING TO CALL THE OFFICE BACK FOR APPOINTMENT.

## 2022-09-09 ENCOUNTER — TELEPHONE (OUTPATIENT)
Dept: FAMILY MEDICINE CLINIC | Age: 62
End: 2022-09-09

## 2022-09-13 DIAGNOSIS — M17.0 PRIMARY OSTEOARTHRITIS OF BOTH KNEES: ICD-10-CM

## 2022-09-13 DIAGNOSIS — M51.36 LUMBAR DEGENERATIVE DISC DISEASE: ICD-10-CM

## 2022-09-13 DIAGNOSIS — R60.0 BILATERAL LEG EDEMA: ICD-10-CM

## 2022-09-13 DIAGNOSIS — S83.231S COMPLEX TEAR OF MEDIAL MENISCUS OF RIGHT KNEE AS CURRENT INJURY, SEQUELA: Primary | ICD-10-CM

## 2022-10-20 ENCOUNTER — PATIENT MESSAGE (OUTPATIENT)
Dept: FAMILY MEDICINE CLINIC | Age: 62
End: 2022-10-20

## 2022-10-21 ENCOUNTER — TELEPHONE (OUTPATIENT)
Dept: FAMILY MEDICINE CLINIC | Age: 62
End: 2022-10-21

## 2023-03-31 ENCOUNTER — PATIENT MESSAGE (OUTPATIENT)
Facility: CLINIC | Age: 63
End: 2023-03-31

## 2023-04-07 NOTE — TELEPHONE ENCOUNTER
From: Manjula Boswell  To: Yisel Cortez  Sent: 3/31/2023 1:24 PM EDT  Subject: Blood panel    Hi,   Dr Tarun Tariq, my pain specialist has ordered in home PT/OT for me. With that service is a weekly in home nurse visit. Nurse Abdelrahman Valle can also draw blood. This would be a good opportunity to do my blood panels, as it is still arduous for me to travel in the wheelchair. . Can you order an RX for a complete blood panel +   -iron (feeling anemic.) . -CRP ( edema/ inflammation.) .   - Enzymes for pancreas / liver (fatty liver disease, long-term RX oxycodone use) . -A1C (it's been over a year)  - Vitamin K ( long-term 2400 daily motrin use). Nurse Abdelrahman Valle phone: [phone]. Blood work will be processed through [facility]. Nurse Abdelrahman Valle is willing to observe, evaluate & report under your guidance. Nurse Abdelrahman Valle. Pls FAX DOCTOR ORDER FOR BLOOD WORK TO  [phone]. [facility]. Arline Bower  [phone]. BD [birth year]    Thank you.

## 2023-04-25 SDOH — ECONOMIC STABILITY: TRANSPORTATION INSECURITY
IN THE PAST 12 MONTHS, HAS LACK OF TRANSPORTATION KEPT YOU FROM MEETINGS, WORK, OR FROM GETTING THINGS NEEDED FOR DAILY LIVING?: YES

## 2023-04-25 SDOH — ECONOMIC STABILITY: FOOD INSECURITY: WITHIN THE PAST 12 MONTHS, YOU WORRIED THAT YOUR FOOD WOULD RUN OUT BEFORE YOU GOT MONEY TO BUY MORE.: SOMETIMES TRUE

## 2023-04-25 SDOH — ECONOMIC STABILITY: HOUSING INSECURITY
IN THE LAST 12 MONTHS, WAS THERE A TIME WHEN YOU DID NOT HAVE A STEADY PLACE TO SLEEP OR SLEPT IN A SHELTER (INCLUDING NOW)?: NO

## 2023-04-25 SDOH — ECONOMIC STABILITY: INCOME INSECURITY: HOW HARD IS IT FOR YOU TO PAY FOR THE VERY BASICS LIKE FOOD, HOUSING, MEDICAL CARE, AND HEATING?: VERY HARD

## 2023-04-25 SDOH — ECONOMIC STABILITY: FOOD INSECURITY: WITHIN THE PAST 12 MONTHS, THE FOOD YOU BOUGHT JUST DIDN'T LAST AND YOU DIDN'T HAVE MONEY TO GET MORE.: SOMETIMES TRUE

## 2023-04-26 ENCOUNTER — OFFICE VISIT (OUTPATIENT)
Facility: CLINIC | Age: 63
End: 2023-04-26
Payer: MEDICAID

## 2023-04-26 VITALS
RESPIRATION RATE: 22 BRPM | TEMPERATURE: 98.7 F | OXYGEN SATURATION: 91 % | DIASTOLIC BLOOD PRESSURE: 79 MMHG | HEIGHT: 72 IN | HEART RATE: 65 BPM | WEIGHT: 293 LBS | SYSTOLIC BLOOD PRESSURE: 137 MMHG | BODY MASS INDEX: 39.68 KG/M2

## 2023-04-26 DIAGNOSIS — E55.9 VITAMIN D DEFICIENCY: ICD-10-CM

## 2023-04-26 DIAGNOSIS — K92.1 BLACK STOOL: ICD-10-CM

## 2023-04-26 DIAGNOSIS — K21.9 GASTROESOPHAGEAL REFLUX DISEASE, UNSPECIFIED WHETHER ESOPHAGITIS PRESENT: ICD-10-CM

## 2023-04-26 DIAGNOSIS — Z71.89 ADVANCED CARE PLANNING/COUNSELING DISCUSSION: ICD-10-CM

## 2023-04-26 DIAGNOSIS — Z00.00 MEDICARE ANNUAL WELLNESS VISIT, SUBSEQUENT: Primary | ICD-10-CM

## 2023-04-26 DIAGNOSIS — E78.5 HYPERLIPIDEMIA WITH TARGET LDL LESS THAN 130: ICD-10-CM

## 2023-04-26 DIAGNOSIS — R73.03 PREDIABETES: ICD-10-CM

## 2023-04-26 DIAGNOSIS — Z79.1 LONG TERM (CURRENT) USE OF NON-STEROIDAL ANTI-INFLAMMATORIES (NSAID): ICD-10-CM

## 2023-04-26 DIAGNOSIS — E03.9 ACQUIRED HYPOTHYROIDISM: ICD-10-CM

## 2023-04-26 DIAGNOSIS — E53.8 VITAMIN B12 DEFICIENCY: ICD-10-CM

## 2023-04-26 PROBLEM — F33.9 RECURRENT DEPRESSION (HCC): Status: ACTIVE | Noted: 2018-01-10

## 2023-04-26 PROCEDURE — G0439 PPPS, SUBSEQ VISIT: HCPCS | Performed by: NURSE PRACTITIONER

## 2023-04-26 RX ORDER — MELOXICAM 15 MG/1
15 TABLET ORAL DAILY
COMMUNITY
Start: 2023-03-27

## 2023-04-26 ASSESSMENT — PATIENT HEALTH QUESTIONNAIRE - PHQ9
SUM OF ALL RESPONSES TO PHQ9 QUESTIONS 1 & 2: 0
6. FEELING BAD ABOUT YOURSELF - OR THAT YOU ARE A FAILURE OR HAVE LET YOURSELF OR YOUR FAMILY DOWN: 1
3. TROUBLE FALLING OR STAYING ASLEEP: 2
2. FEELING DOWN, DEPRESSED OR HOPELESS: 0
5. POOR APPETITE OR OVEREATING: 1
4. FEELING TIRED OR HAVING LITTLE ENERGY: 2
SUM OF ALL RESPONSES TO PHQ QUESTIONS 1-9: 7
1. LITTLE INTEREST OR PLEASURE IN DOING THINGS: 0
SUM OF ALL RESPONSES TO PHQ QUESTIONS 1-9: 7
8. MOVING OR SPEAKING SO SLOWLY THAT OTHER PEOPLE COULD HAVE NOTICED. OR THE OPPOSITE, BEING SO FIGETY OR RESTLESS THAT YOU HAVE BEEN MOVING AROUND A LOT MORE THAN USUAL: 0
9. THOUGHTS THAT YOU WOULD BE BETTER OFF DEAD, OR OF HURTING YOURSELF: 0
10. IF YOU CHECKED OFF ANY PROBLEMS, HOW DIFFICULT HAVE THESE PROBLEMS MADE IT FOR YOU TO DO YOUR WORK, TAKE CARE OF THINGS AT HOME, OR GET ALONG WITH OTHER PEOPLE: 0
7. TROUBLE CONCENTRATING ON THINGS, SUCH AS READING THE NEWSPAPER OR WATCHING TELEVISION: 1
SUM OF ALL RESPONSES TO PHQ QUESTIONS 1-9: 7
SUM OF ALL RESPONSES TO PHQ QUESTIONS 1-9: 7

## 2023-04-26 ASSESSMENT — LIFESTYLE VARIABLES
HOW OFTEN DO YOU HAVE A DRINK CONTAINING ALCOHOL: NEVER
HOW MANY STANDARD DRINKS CONTAINING ALCOHOL DO YOU HAVE ON A TYPICAL DAY: PATIENT DOES NOT DRINK

## 2023-04-26 NOTE — PATIENT INSTRUCTIONS
low-dose aspirin. Wait for an ambulance. Do not try to drive yourself. Watch closely for changes in your health, and be sure to contact your doctor if you have any problems. Where can you learn more? Go to http://www.bolaños.com/ and enter F075 to learn more about \"A Healthy Heart: Care Instructions. \"  Current as of: September 7, 2022               Content Version: 13.6  © 4587-1294 [employer]. Care instructions adapted under license by [facility] ([facility]). If you have questions about a medical condition or this instruction, always ask your healthcare professional. [place] [address] any warranty or liability for your use of this information. Personalized Preventive Plan for Jeri Moreno - 4/26/2023  Medicare offers a range of preventive health benefits. Some of the tests and screenings are paid in full while other may be subject to a deductible, co-insurance, and/or copay. Some of these benefits include a comprehensive review of your medical history including lifestyle, illnesses that may run in your family, and various assessments and screenings as appropriate. After reviewing your medical record and screening and assessments performed today your provider may have ordered immunizations, labs, imaging, and/or referrals for you. A list of these orders (if applicable) as well as your Preventive Care list are included within your After Visit Summary for your review. Other Preventive Recommendations:    A preventive eye exam performed by an eye specialist is recommended every 1-2 years to screen for glaucoma; cataracts, macular degeneration, and other eye disorders. A preventive dental visit is recommended every 6 months. Try to get at least 150 minutes of exercise per week or 10,000 steps per day on a pedometer . Order or download the FREE \"Exercise & Physical Activity: Your Everyday Guide\" from [employer].  Call 3-868.730.7401 or

## 2023-04-26 NOTE — PROGRESS NOTES
Medicare Annual Wellness Visit    Carl Cutler is here for Follow-up Chronic Condition, Diabetes, and Medicare AWV    Assessment & Plan   Medicare annual wellness visit, subsequent  Completed today to include ETOH and depression screening  Advanced care planning/counseling discussion  Healthcare decision maker verified and ACP Discussion performed and documented in note  Acquired hypothyroidism  -     Comprehensive Metabolic Panel; Future  -     TSH; Future  Endorses medication compliance, Follow-up labs today, Endorses symptoms consistent with hypothyroidism, and managed by endocrinology  Hyperlipidemia with target LDL less than 130  -     Lipid Panel; Future  Follow-up labs today  Prediabetes  -     Hemoglobin A1C; Future  Follow-up labs today  Vitamin B12 deficiency  -     Vitamin B12; Future  Follow-up labs today  Vitamin D deficiency  -     Vitamin D 25 Hydroxy; Future  Follow-up labs today  Black stool  -     CBC; Future  States she had a maroon stool a couple of weeks back, check cbc as she uses NSAIDS chronically     Gastroesophageal reflux disease, unspecified whether esophagitis present  -     esomeprazole (NEXIUM) 20 MG delayed release capsule; Take 1 capsule by mouth daily, Disp-30 capsule, R-3Normal    Long term (current) use of non-steroidal anti-inflammatories (nsaid)  -     esomeprazole (NEXIUM) 20 MG delayed release capsule; Take 1 capsule by mouth daily, Disp-30 capsule, R-3Normal     Labs at visits       Recommendations for Preventive Services Due: see orders and patient instructions/AVS.  Recommended screening schedule for the next 5-10 years is provided to the patient in written form: see Patient Instructions/AVS.     Return in about 4 months (around 8/26/2023) for hypothyroid, HLD, 30min, office. Subjective   The following acute and/or chronic problems were also addressed today:  HLD:  Has been compliant with meds  Yes, taking otc omega 3 daily  Compliant with low-fat diet.   most of

## 2023-04-26 NOTE — ACP (ADVANCE CARE PLANNING)
Advance Care Planning     General Advance Care Planning (ACP) Conversation    Date of Conversation: 4/26/2023  Conducted with: Patient with Decision Making Capacity    Healthcare Decision Maker:    Primary Decision Maker: Grady Logan Other - [phone]    Secondary Decision Maker: Karrie Obrien - Other - [phone]  Click here to complete Healthcare Decision Makers including selection of the Healthcare Decision Maker Relationship (ie \"Primary\"). Today we documented Decision Maker(s) consistent with Legal Next of Kin hierarchy. Content/Action Overview:   Has ACP document(s) NOT on file - requested patient to provide  Reviewed DNR/DNI and patient elects Full Code (Attempt Resuscitation)  resuscitation preferences      Length of Voluntary ACP Conversation in minutes:  <16 minutes (Non-Billable)    JACKIE Barnett - CNP

## 2023-04-26 NOTE — PROGRESS NOTES
Room 8     When asked if patient has any concerns she would like to address with GEORGE Chapin patient states yes I would like to discuss esomeprazole magnesium dr capsule comes in 20mg -40mg       Did patient bring someone? No     Did the patient have DME equipment? Yes Wheel Chair    Did you take your medication today? Yes       1. \"Have you been to the ER, urgent care clinic since your last visit? Hospitalized since your last visit? \" No     2. \"Have you seen or consulted any other health care providers outside of the [address] [place] [address] since your last visit? \" No     3. For patients aged 39-70: Has the patient had a colonoscopy / FIT/ Cologuard? No Patient refused       If the patient is female:    4. For patients aged 41-77: Has the patient had a mammogram within the past 2 years? No patient refused       5. For patients aged 21-65: Has the patient had a pap smear? {Cancer Care Gap present? Yes       PHQ-9  4/26/2023   Little interest or pleasure in doing things 0   Little interest or pleasure in doing things -   Feeling down, depressed, or hopeless 0   Trouble falling or staying asleep, or sleeping too much 2   Feeling tired or having little energy 2   Poor appetite or overeating 1   Feeling bad about yourself - or that you are a failure or have let yourself or your family down 1   Trouble concentrating on things, such as reading the newspaper or watching television 1   Moving or speaking so slowly that other people could have noticed. Or the opposite - being so fidgety or restless that you have been moving around a lot more than usual 0   Thoughts that you would be better off dead, or of hurting yourself in some way 0   PHQ-2 Score 0   Total Score PHQ 2 -   PHQ-9 Total Score 7   If you checked off any problems, how difficult have these problems made it for you to do your work, take care of things at home, or get along with other people?  0          Health Maintenance Due   Topic Date Due    COVID-19

## 2023-04-27 LAB
25(OH)D3+25(OH)D2 SERPL-MCNC: 53.3 NG/ML (ref 30–100)
ALBUMIN SERPL-MCNC: 3.9 G/DL (ref 3.8–4.8)
ALBUMIN/GLOB SERPL: 1.6 {RATIO} (ref 1.2–2.2)
ALP SERPL-CCNC: 84 IU/L (ref 44–121)
ALT SERPL-CCNC: 14 IU/L (ref 0–32)
AST SERPL-CCNC: 14 IU/L (ref 0–40)
BASOPHILS # BLD AUTO: 0.1 X10E3/UL (ref 0–0.2)
BASOPHILS NFR BLD AUTO: 1 %
BILIRUB SERPL-MCNC: 0.4 MG/DL (ref 0–1.2)
BUN SERPL-MCNC: 21 MG/DL (ref 8–27)
BUN/CREAT SERPL: 24 (ref 12–28)
CALCIUM SERPL-MCNC: 9.2 MG/DL (ref 8.7–10.3)
CHLORIDE SERPL-SCNC: 95 MMOL/L (ref 96–106)
CHOLEST SERPL-MCNC: 251 MG/DL (ref 100–199)
CO2 SERPL-SCNC: 20 MMOL/L (ref 20–29)
CREAT SERPL-MCNC: 0.88 MG/DL (ref 0.57–1)
EGFRCR SERPLBLD CKD-EPI 2021: 75 ML/MIN/1.73
EOSINOPHIL # BLD AUTO: 0.3 X10E3/UL (ref 0–0.4)
EOSINOPHIL NFR BLD AUTO: 4 %
ERYTHROCYTE [DISTWIDTH] IN BLOOD BY AUTOMATED COUNT: 15.4 % (ref 11.7–15.4)
GLOBULIN SER CALC-MCNC: 2.4 G/DL (ref 1.5–4.5)
GLUCOSE SERPL-MCNC: 110 MG/DL (ref 70–99)
HBA1C MFR BLD: 6.6 % (ref 4.8–5.6)
HCT VFR BLD AUTO: 43.1 % (ref 34–46.6)
HDLC SERPL-MCNC: 47 MG/DL
HGB BLD-MCNC: 13.9 G/DL (ref 11.1–15.9)
IMM GRANULOCYTES # BLD AUTO: 0 X10E3/UL (ref 0–0.1)
IMM GRANULOCYTES NFR BLD AUTO: 0 %
LDLC SERPL CALC-MCNC: 172 MG/DL (ref 0–99)
LYMPHOCYTES # BLD AUTO: 1.2 X10E3/UL (ref 0.7–3.1)
LYMPHOCYTES NFR BLD AUTO: 14 %
MCH RBC QN AUTO: 27 PG (ref 26.6–33)
MCHC RBC AUTO-ENTMCNC: 32.3 G/DL (ref 31.5–35.7)
MCV RBC AUTO: 84 FL (ref 79–97)
MONOCYTES # BLD AUTO: 0.6 X10E3/UL (ref 0.1–0.9)
MONOCYTES NFR BLD AUTO: 7 %
NEUTROPHILS # BLD AUTO: 6.7 X10E3/UL (ref 1.4–7)
NEUTROPHILS NFR BLD AUTO: 74 %
PLATELET # BLD AUTO: 312 X10E3/UL (ref 150–450)
POTASSIUM SERPL-SCNC: 4.9 MMOL/L (ref 3.5–5.2)
PROT SERPL-MCNC: 6.3 G/DL (ref 6–8.5)
RBC # BLD AUTO: 5.15 X10E6/UL (ref 3.77–5.28)
SODIUM SERPL-SCNC: 137 MMOL/L (ref 134–144)
SPECIMEN STATUS REPORT: NORMAL
TRIGL SERPL-MCNC: 174 MG/DL (ref 0–149)
TSH SERPL DL<=0.005 MIU/L-ACNC: 2.51 UIU/ML (ref 0.45–4.5)
VIT B12 SERPL-MCNC: 400 PG/ML (ref 232–1245)
VLDLC SERPL CALC-MCNC: 32 MG/DL (ref 5–40)
WBC # BLD AUTO: 9 X10E3/UL (ref 3.4–10.8)

## 2023-05-03 ENCOUNTER — TELEPHONE (OUTPATIENT)
Facility: CLINIC | Age: 63
End: 2023-05-03

## 2023-05-08 ENCOUNTER — TELEPHONE (OUTPATIENT)
Facility: CLINIC | Age: 63
End: 2023-05-08

## 2023-05-08 ENCOUNTER — PATIENT MESSAGE (OUTPATIENT)
Facility: CLINIC | Age: 63
End: 2023-05-08

## 2023-05-08 NOTE — TELEPHONE ENCOUNTER
From: Chin Haley  To: Ben Brock  Sent: 5/8/2023 1:25 PM EDT  Subject: Coated Motrin 600 mg    Hello, can you send an RX to [facility] on [address] for coated 724 Motrin mg * 4 *x a day. I have a script for 800 Motrin three times a day. The Motrin is not coated to protect my tummy. I would like to see if I have less stomach upset with a coated lower dose Motrin. The meloxicam I tried in April is not having any effect on my pain and swelling . I would like to go back to a coated lower dose Motrin and stop taking the meloxicam.   Thank you for your help.

## 2023-05-08 NOTE — TELEPHONE ENCOUNTER
Patient called stated she would like to change her medication back to Motrin but decrease the dose to 600  mg (coated) 4 times a day because the Meloxicam is not working.       Call back [phone]

## 2023-05-10 ENCOUNTER — PATIENT MESSAGE (OUTPATIENT)
Facility: CLINIC | Age: 63
End: 2023-05-10

## 2023-05-15 NOTE — TELEPHONE ENCOUNTER
From: Cherry Palencia  To: Chikis Salvador  Sent: 5/10/2023 5:22 PM EDT  Subject: ibuprofen    I received your message asking to change back to motrin, I am happy to place there order for you but I do not think the 600mg will be coated. Do you still want this? Trinh Bird.

## 2023-07-16 DIAGNOSIS — Z79.1 LONG TERM (CURRENT) USE OF NON-STEROIDAL ANTI-INFLAMMATORIES (NSAID): ICD-10-CM

## 2023-07-16 DIAGNOSIS — K21.9 GASTROESOPHAGEAL REFLUX DISEASE, UNSPECIFIED WHETHER ESOPHAGITIS PRESENT: ICD-10-CM

## 2023-07-17 DIAGNOSIS — K21.9 GASTROESOPHAGEAL REFLUX DISEASE, UNSPECIFIED WHETHER ESOPHAGITIS PRESENT: ICD-10-CM

## 2023-07-17 DIAGNOSIS — Z79.1 LONG TERM (CURRENT) USE OF NON-STEROIDAL ANTI-INFLAMMATORIES (NSAID): ICD-10-CM

## 2023-07-18 ENCOUNTER — TELEPHONE (OUTPATIENT)
Facility: CLINIC | Age: 63
End: 2023-07-18

## 2023-07-18 NOTE — TELEPHONE ENCOUNTER
[facility] overnight post oximeter for the patient. Chrystal stated the patient's oxygen levels were dropping. She also requested that the patient have a hospital follow up. Patient was discharged 7/6/2023.   I was able to schedule a virtual for 7/19 at 11:00 am.     Call back [phone]

## 2023-07-19 ENCOUNTER — TELEMEDICINE (OUTPATIENT)
Facility: CLINIC | Age: 63
End: 2023-07-19
Payer: MEDICARE

## 2023-07-19 DIAGNOSIS — Z09 HOSPITAL DISCHARGE FOLLOW-UP: ICD-10-CM

## 2023-07-19 DIAGNOSIS — J96.01 ACUTE RESPIRATORY FAILURE WITH HYPOXIA (HCC): Primary | ICD-10-CM

## 2023-07-19 DIAGNOSIS — E55.9 VITAMIN D DEFICIENCY: ICD-10-CM

## 2023-07-19 DIAGNOSIS — M51.36 LUMBAR DEGENERATIVE DISC DISEASE: ICD-10-CM

## 2023-07-19 PROCEDURE — 99214 OFFICE O/P EST MOD 30 MIN: CPT | Performed by: NURSE PRACTITIONER

## 2023-07-19 RX ORDER — CHOLECALCIFEROL (VITAMIN D3) 1250 MCG
CAPSULE ORAL
Qty: 38 CAPSULE | OUTPATIENT
Start: 2023-07-19

## 2023-07-19 RX ORDER — CELECOXIB 200 MG/1
200 CAPSULE ORAL DAILY
Qty: 90 CAPSULE | Refills: 3 | Status: SHIPPED | OUTPATIENT
Start: 2023-07-19

## 2023-07-19 ASSESSMENT — PATIENT HEALTH QUESTIONNAIRE - PHQ9
1. LITTLE INTEREST OR PLEASURE IN DOING THINGS: 1
7. TROUBLE CONCENTRATING ON THINGS, SUCH AS READING THE NEWSPAPER OR WATCHING TELEVISION: 1
SUM OF ALL RESPONSES TO PHQ QUESTIONS 1-9: 10
2. FEELING DOWN, DEPRESSED OR HOPELESS: 1
8. MOVING OR SPEAKING SO SLOWLY THAT OTHER PEOPLE COULD HAVE NOTICED. OR THE OPPOSITE, BEING SO FIGETY OR RESTLESS THAT YOU HAVE BEEN MOVING AROUND A LOT MORE THAN USUAL: 1
SUM OF ALL RESPONSES TO PHQ QUESTIONS 1-9: 10
SUM OF ALL RESPONSES TO PHQ9 QUESTIONS 1 & 2: 2
6. FEELING BAD ABOUT YOURSELF - OR THAT YOU ARE A FAILURE OR HAVE LET YOURSELF OR YOUR FAMILY DOWN: 1
10. IF YOU CHECKED OFF ANY PROBLEMS, HOW DIFFICULT HAVE THESE PROBLEMS MADE IT FOR YOU TO DO YOUR WORK, TAKE CARE OF THINGS AT HOME, OR GET ALONG WITH OTHER PEOPLE: 0
3. TROUBLE FALLING OR STAYING ASLEEP: 3
9. THOUGHTS THAT YOU WOULD BE BETTER OFF DEAD, OR OF HURTING YOURSELF: 0
4. FEELING TIRED OR HAVING LITTLE ENERGY: 1
SUM OF ALL RESPONSES TO PHQ QUESTIONS 1-9: 10
SUM OF ALL RESPONSES TO PHQ QUESTIONS 1-9: 10
5. POOR APPETITE OR OVEREATING: 1

## 2023-07-19 NOTE — PROGRESS NOTES
Please use this number [phone]    Bridgette Johnson had concerns including Follow-Up from Hospital. for today's visit . When asked if patient has any concerns she would like to address with GEORGE Meraz . Patient states I would like to discuss Cat Scan results . Patient states Home Health nurse is requesting 24 hours pulse reading test due to having low oxygen for 10 days prior to going to [facility] . GEORGE Meraz has been notified  of patient concerns . 1. \"Have you been to the ER, urgent care clinic since your last visit? Hospitalized since your last visit? \"  Patient states yes I went to [facility] due to shortness of breathe and I was transferred to [facility] thalia to needing to be weighed. 2. \"Have you seen or consulted any other health care providers outside of the [address] [place] [address] since your last visit? \" No     3. For patients aged 43-73: Has the patient had a colonoscopy / FIT/ Cologuard? Patient refused       If the patient is female:    4. For patients aged 43-66: Has the patient had a mammogram within the past 2 years? yes      5. For patients aged 21-65: Has the patient had a pap smear? {Cancer Care Gap present? Yes       PHQ-9  7/19/2023   Little interest or pleasure in doing things 1   Little interest or pleasure in doing things -   Feeling down, depressed, or hopeless 1   Trouble falling or staying asleep, or sleeping too much 3   Feeling tired or having little energy 1   Poor appetite or overeating 1   Feeling bad about yourself - or that you are a failure or have let yourself or your family down 1   Trouble concentrating on things, such as reading the newspaper or watching television 1   Moving or speaking so slowly that other people could have noticed.  Or the opposite - being so fidgety or restless that you have been moving around a lot more than usual 1   Thoughts that you would be better off dead, or of hurting yourself in some way 0   PHQ-2 Score 2

## 2023-07-19 NOTE — PROGRESS NOTES
Kinjal Davis (:  1960) is a Established patient, presenting virtually for evaluation of the following:    Assessment & Plan   Below is the assessment and plan developed based on review of pertinent history, physical exam, labs, studies, and medications. 1. Acute respiratory failure with hypoxia ([address])  2. Hospital discharge follow-up  3. Lumbar degenerative disc disease  -     celecoxib (CELEBREX) 200 MG capsule; Take 1 capsule by mouth daily, Disp-90 capsule, R-3Normal  4. Vitamin D deficiency  -     vitamin D (CHOLECALCIFEROL) 62478 UNIT CAPS; Take  by mouth Every Mon, Wed & Sun., Disp-12 capsule, R-1Normal    Recent hospitalization for resp fail  Currently at home with home health care  Plan is to performe overnight oximetry test for possible home O2 needs  Medication reconciliation completed  Refills provided  Patient verbalized understanding and is in agreement with this plan of care          Return in about 3 months (around 10/19/2023) for hypothyroid, HLD, 30min, office. Subjective   HPI    Hospital follow up  23 till 23    Hospital Course:   Kinjal Davis is a 64 yo female with PMH of Morbid Obesity BMI 68, Chronic pain on opoids, PTSD, Insomnia on chronic benzodiazepines, Pre DM, HLD,hypothyroidism. Presented to [facility] ER with complains of SOB for for past 10 days, with sats dropping to high 80s at home. Patient reports her therapist measures saturation before starting sessions and this is a new finding. In [facility] ddimer was elevated and decided to proceed with CTA to rule out PE. Given body weight of patient , CTA could not be done there and patient was transferred to [facility].     Upon arrival patient refused CTA due to contrast allergy, requested admission to [facility] instead of going back to [facility]. Pt was offered pre medication for CTA but patient refuses. .      Pt was admitted for treatment of acute hypoxic / Hypercapnic

## 2023-07-20 NOTE — TELEPHONE ENCOUNTER
Spoke with [employer]. She is going to enter the order for the overnight oximeter and nutrition drinks. They will be sent to me for signatures. Once the overnight oximeter is complete the results will be sent to me for review.

## 2023-08-07 ENCOUNTER — TELEPHONE (OUTPATIENT)
Facility: CLINIC | Age: 63
End: 2023-08-07

## 2023-08-07 NOTE — TELEPHONE ENCOUNTER
Patient stated that she needs a new Rx for medication that was given to her in the hospital.    Albuteral Sulfate Inhaler 200 meter, 90 mcg  Incruse Ellipta 62.5 mcg    She stated that she needs a order for a pulse reader as well.

## 2023-08-08 RX ORDER — ALBUTEROL SULFATE 90 UG/1
2 AEROSOL, METERED RESPIRATORY (INHALATION) EVERY 4 HOURS PRN
Qty: 18 G | Refills: 3 | Status: SHIPPED | OUTPATIENT
Start: 2023-08-08

## 2023-08-08 RX ORDER — ALBUTEROL SULFATE 90 UG/1
AEROSOL, METERED RESPIRATORY (INHALATION)
COMMUNITY
Start: 2023-06-20 | End: 2023-08-08 | Stop reason: SDUPTHER

## 2023-08-10 NOTE — TELEPHONE ENCOUNTER
Phone call from [facility] from At [address] calling on  a prescription for Spriva Respimat for the patient. She stated it was listed on her June 20th discharge summary  and she needs a refill.

## 2023-08-15 ENCOUNTER — TELEPHONE (OUTPATIENT)
Facility: CLINIC | Age: 63
End: 2023-08-15

## 2023-08-15 NOTE — TELEPHONE ENCOUNTER
[facility] is calling with the status on the overnight pulse oximetry.     Call back to [facility] [phone]

## 2023-08-24 ENCOUNTER — TELEPHONE (OUTPATIENT)
Facility: CLINIC | Age: 63
End: 2023-08-24

## 2023-08-24 DIAGNOSIS — J96.11 CHRONIC RESPIRATORY FAILURE WITH HYPOXIA (HCC): Primary | ICD-10-CM

## 2023-08-24 NOTE — TELEPHONE ENCOUNTER
Called to review overnight pulse oximeter results. Advised of results. States she is using albuterol as needed and performing deep breathing exercises to get her sats up to 95% but it does not stay. States when she was in the hospital she was not told she snored or looked like she was ever gasping for air. Advised I recommend to see pulmonary for further evaluation. She is in agreement   Referral placed.   Will send order for O2 at 3lpm via nc while sleeping

## 2023-08-26 ENCOUNTER — PATIENT MESSAGE (OUTPATIENT)
Facility: CLINIC | Age: 63
End: 2023-08-26

## 2023-08-28 NOTE — TELEPHONE ENCOUNTER
From: Kiara Smalls  To: Homar Ansley  Sent: 8/26/2023 2:27 PM EDT  Subject: Gabriella Franciscousman for small portable oxygen generator    Thank you for writing the orders for oxygen in the home and the sleep studies. Please also write an order for a small oxygen generator. [employer] Valentin saying that as soon as they receive that order they can deliver the small oxygen portable generator as the same time as a larger tanks. The small oxygen generator will be very helpful to me in my daily living. Thanks for all your support and assistance. [phone].

## 2023-08-28 NOTE — TELEPHONE ENCOUNTER
From: Bogdan Cordoba  To: Karmen Montaño  Sent: 8/26/2023 12:58 PM EDT  Subject: Appt on Aug 28, 2023. This was a hospital follow-up appointment. I have canceled this appointment twice over the phone. I would need another appointment September 18th 2023. I will not be at the appointment on August 28th, 2023. I will call again Monday morning to cancel a third time. Thank you.

## 2023-08-30 ENCOUNTER — TELEPHONE (OUTPATIENT)
Facility: CLINIC | Age: 63
End: 2023-08-30

## 2023-08-30 NOTE — TELEPHONE ENCOUNTER
I called the patient to get clarification on her insurance  and she states it is one card and it is [employer] /medicaid . We discussed the limitations of her insurance being accepted at many specialty offices. She prefers a doctor closer to her home in [place]- not particularly a \"[facility]office. She stated that transportation is very difficult for her due to mobility and someone has to take her to appt. She asked if there is any doctor that will do telehealth or house calls. I told her I was not sure of this. But that I know an actual sleep study can be done in the home. She also asked about her Oxygen order- if she is supposed to contact them or will they call her. I told her I would message Vi Garcia with her questions.

## 2023-08-31 NOTE — TELEPHONE ENCOUNTER
1st attempt calling patient in reguards of message stating GEORGE Otero has placed the order for sleep medicine . Patient did not answer. Left message stating to call the office back .

## 2023-09-24 DIAGNOSIS — E55.9 VITAMIN D DEFICIENCY: ICD-10-CM

## 2023-09-25 RX ORDER — CHOLECALCIFEROL (VITAMIN D3) 1250 MCG
CAPSULE ORAL
Qty: 12 CAPSULE | Refills: 1 | OUTPATIENT
Start: 2023-09-25

## 2023-09-27 ENCOUNTER — TELEPHONE (OUTPATIENT)
Facility: CLINIC | Age: 63
End: 2023-09-27

## 2023-09-27 NOTE — TELEPHONE ENCOUNTER
Called patient in regards of message stating patient called . Patient did not answer. Left message stating to call the office back in reguards of message .

## 2023-09-27 NOTE — TELEPHONE ENCOUNTER
Spoke with Ramiro Brock in [place] of patient is waiting to receive oxygen supplies . Ramiro Brock states they are waiting for the prior auth from insurance . Patient has been given the number to Ramiro Brock  . Patient verbalized understanding.

## 2023-09-27 NOTE — TELEPHONE ENCOUNTER
----- Message from Rossn Meckel sent at 9/26/2023  1:24 PM EDT -----  Subject: Message to Provider    QUESTIONS  Information for Provider?  Pt returned call; attempted to [facility] with no   success; please return pt's call  ---------------------------------------------------------------------------  --------------  [address] [place] [address] Kim  [phone]; OK to leave message on voicemail  ---------------------------------------------------------------------------  --------------  SCRIPT ANSWERS  undefined

## 2023-10-09 ENCOUNTER — TELEPHONE (OUTPATIENT)
Facility: CLINIC | Age: 63
End: 2023-10-09

## 2023-10-09 NOTE — TELEPHONE ENCOUNTER
[facility] returned call. [employer] is the company suppling the [address] states she is dening the oxygen because it is not a portable O2 concentrator. [facility] also reports mrs he is requesting O2 for all day long but has only been approved for night time O2 because that is all she was tested for. Jose E kurtz we are working on finding a pulmonology group who might do all virtual visits due to patient mobility an difficulty leaving the house.

## 2023-10-09 NOTE — TELEPHONE ENCOUNTER
Returned call. No answer, lmalejandra the O2 order has been placed two times. Advised to call me back at office.

## 2023-10-09 NOTE — TELEPHONE ENCOUNTER
Brady  with [address] is check to see if you put in an order for a Portable Oxygen Tank, she see a Rx for Night Time Oxygen. Patient states she needs a RX for a Portable Oxygen Tank. Ms. Laura Hernandez can be reached at [phone].

## 2023-10-12 ENCOUNTER — TELEPHONE (OUTPATIENT)
Facility: CLINIC | Age: 63
End: 2023-10-12

## 2023-10-12 DIAGNOSIS — J96.11 CHRONIC RESPIRATORY FAILURE WITH HYPOXIA (HCC): Primary | ICD-10-CM

## 2023-10-12 NOTE — TELEPHONE ENCOUNTER
Vaccine Information Statement(s) was given today. This has been reviewed, questions answered, and verbal consent given by Patient for injection(s) and administration of Influenza (Inactivated) and Tetanus/Diphtheria/Pertussis (Tdap).    Patient tolerated without incident. See immunization grid for documentation.         Spoke to patient. Advised she would need to see a pulmonologist for formal evaluation to get home O2 for day and night. She is in agreement to see who ever takes her insurance although getting out of the house is challenging. She hopes to get the night time O2 set up in her house within the next week. Pulmonary referral placed. She does not want to go to [place] or [place] to see [facility] pulmonary.

## 2023-10-14 DIAGNOSIS — K21.9 GASTROESOPHAGEAL REFLUX DISEASE, UNSPECIFIED WHETHER ESOPHAGITIS PRESENT: ICD-10-CM

## 2023-10-14 DIAGNOSIS — Z79.1 LONG TERM (CURRENT) USE OF NON-STEROIDAL ANTI-INFLAMMATORIES (NSAID): ICD-10-CM

## 2023-10-25 ENCOUNTER — OFFICE VISIT (OUTPATIENT)
Age: 63
End: 2023-10-25
Payer: MEDICARE

## 2023-10-25 DIAGNOSIS — R07.9 CHEST PAIN, UNSPECIFIED TYPE: ICD-10-CM

## 2023-10-25 DIAGNOSIS — R01.1 HEART MURMUR: ICD-10-CM

## 2023-10-25 DIAGNOSIS — I71.21 ASCENDING AORTIC ANEURYSM, UNSPECIFIED WHETHER RUPTURED (HCC): Primary | ICD-10-CM

## 2023-10-25 PROCEDURE — 99441 PR PHYS/QHP TELEPHONE EVALUATION 5-10 MIN: CPT | Performed by: INTERNAL MEDICINE

## 2023-10-25 NOTE — PROGRESS NOTES
Patient unable to get to office. Called and discussed recent events, hospitalization. Ongoing dyspnea, difficulty walking 15 feet. Known thoracic aneurysm, last check 5.2 cm. Unable to do MRA due to weight since 2020. Unlikely to tolerate thoracotomy at this time due to weight, discussed ongoing rehab to improve mobility and weight loss. Will see in 6 months in office with echo.

## 2023-11-07 PROBLEM — J96.11 CHRONIC RESPIRATORY FAILURE WITH HYPOXIA (HCC): Status: ACTIVE | Noted: 2023-11-07

## 2023-11-07 PROBLEM — J96.01 ACUTE RESPIRATORY FAILURE WITH HYPOXIA (HCC): Status: RESOLVED | Noted: 2023-06-16 | Resolved: 2023-11-07

## 2023-11-20 DIAGNOSIS — E55.9 VITAMIN D DEFICIENCY: ICD-10-CM

## 2023-11-20 RX ORDER — CHOLECALCIFEROL (VITAMIN D3) 1250 MCG
CAPSULE ORAL
Qty: 12 CAPSULE | Refills: 1 | Status: SHIPPED | OUTPATIENT
Start: 2023-11-20

## 2023-12-11 ENCOUNTER — TELEPHONE (OUTPATIENT)
Facility: CLINIC | Age: 63
End: 2023-12-11

## 2023-12-12 NOTE — TELEPHONE ENCOUNTER
Called to discuss continued care. [facility] are not currently accredited with [employer]. Patient states she is ok with the transfer of care to [facility], however, if she becomes dissatisfied with their care she would like to return to my services. Advised that would be OK.

## 2024-03-21 DIAGNOSIS — E55.9 VITAMIN D DEFICIENCY: ICD-10-CM

## 2024-03-21 RX ORDER — CHOLECALCIFEROL (VITAMIN D3) 1250 MCG
CAPSULE ORAL
Qty: 12 CAPSULE | Refills: 1 | OUTPATIENT
Start: 2024-03-21

## 2024-04-06 DIAGNOSIS — Z79.1 LONG TERM (CURRENT) USE OF NON-STEROIDAL ANTI-INFLAMMATORIES (NSAID): ICD-10-CM

## 2024-04-06 DIAGNOSIS — K21.9 GASTROESOPHAGEAL REFLUX DISEASE, UNSPECIFIED WHETHER ESOPHAGITIS PRESENT: ICD-10-CM

## 2024-06-01 NOTE — TELEPHONE ENCOUNTER
Verified patient by full name and date of birth. Notified patient of provider recommendations. Patient verbalized understanding without any further questions or concerns.  used

## 2025-02-10 NOTE — PROGRESS NOTES
PHYSICAL THERAPY - DAILY TREATMENT NOTE - AQUATICS    Patient Name: Champ Newman        Date: 2017  : 1960   YES Patient  Verified  Visit #:  6   of   10  Insurance: Payor: Darcy Valerio / Plan: [place] MEDICARE PART A & B / Product Type: Medicare /      In time: 200 Out time: 253   Total Treatment Time: 48     Medicare Time Tracking (below)   Total Timed Codes (min):  27 1:1 Treatment Time:  27     TREATMENT AREA =  Low back pain [M54.5]  Left knee pain [M25.562]    SUBJECTIVE    Pain Level (on 0 to 10 scale):  7 / 10   Medication Changes/New allergies or changes in medical history, any new surgeries or procedures? NO    If yes, update Summary List   Subjective Functional Status/Changes:  []  No changes reported     \"I am usually much more sore the same day but then feel better on the second day, awaiting my knee MRI this week\"         OBJECTIVE  53(27) min Therapeutic Exercise:   [x]  Aquatic Therapy   Rationale:      increase ROM, increase strength, improve coordination, proprioception and improve balance to improve the patients ability to perform ADL's and increase level of independence.      [x]   Patient Education:  Continue Aquatic Therapy and HEP as instructed     UE exercise resistance:                                                  LE exercises:                                                                 [] none                                                                             []  thera-band resistance       [x] small water weights                                                   [] no UE support       [] medium water weights                                              []  1 UE support        [] large water weights                                                   [x]  2 UE support          [] back supported on wall       [] thera-band      SLS UE support:                      [x] both UE                          [] 1 UE       [] 1 finger/fingers       []  none [] EC     Post Treatment Pain Level (on 0 to 10) scale:   8  / 10 knee and 9 for lumbar     Other  cued pt for squat relaxation exs      ASSESSMENT    Assessment/Changes in Function:   Pt is maintaining improved standing posture with exs at side of pool today and is not taking as many breaks with there exs and able to complete 15 to 20 reps of exs. Pt remains unable to perform full laps for warm ups and cool downs. Pt cued and responds to not holding on to lap divider for balance with walks. Pt needs moderate cueing for wide and split stance with UE exs. []  See Progress Note/Recertification   Patient will continue to benefit from skilled PT services to modify and progress therapeutic interventions, address functional mobility deficits, address ROM deficits, address strength deficits, analyze and address soft tissue restrictions, analyze and cue movement patterns, analyze and modify body mechanics/ergonomics, assess and modify postural abnormalities and instruct in home and community integration to attain remaining goals. to attain remaining goals.    Progress toward goals / Updated goals:    Progressing with aquatic exs indep/ pts endurance and tolerance remains poor     PLAN    []  Upgrade activities as tolerated YES Continue plan of care   []  Discharge due to :    []  Other:      Therapist: Spring Chatman PT    Date: 11/27/2017 Time: 12:24 PM   Future Appointments  Date Time Provider Siva Wright   11/27/2017 2:00 PM Sarah Noble [facility]   11/29/2017 2:45 PM [facility] RM 2 [facility]   11/30/2017 12:00 PM Rajinder [facility]   12/4/2017 1:00 PM Sarah Noble [facility]   12/8/2017 11:00 AM Shabnam [facility]   12/11/2017 1:00 PM Dilan Corado [facility]   12/12/2017 5:30 PM Rajinder [facility]   12/14/2017 1:00 PM Emeka Diana [facility]   12/18/2017 1:00 PM Dilan Corado [facility]   12/21/2017 1:00 PM Emeka Diana [facility] 12/27/2017 11:30 AM Angelito Peres [facility]   12/29/2017 11:00 AM [facility] No

## 2025-02-23 NOTE — TELEPHONE ENCOUNTER
Spoke with patient and she stated that she cannot afford to see a dermatologist for the ringworm.  Patient requested that PCP send in ketoconazole 2% topical cream to the [facility] pharmacy on [facility]

## (undated) DEVICE — INSULATED BLADE ELECTRODE: Brand: EDGE

## (undated) DEVICE — APPLIER CLP L9.375IN APER 2.1MM CLS L3.8MM 20 SM TI CLP

## (undated) DEVICE — FLUFF AND POLYMER UNDERPAD,EXTRA HEAVY: Brand: WINGS

## (undated) DEVICE — MIRAGE SWIFT II PILLOW LGE: Brand: MIRAGE SWIFT II

## (undated) DEVICE — (D)BNDG ADHESIVE FABRIC 3/4X3 -- DISC BY MFR USE ITEM 357960

## (undated) DEVICE — CATHETER THOR 36FR DIA10.7MM POLYVI CHL TRCR TIP STR SFT

## (undated) DEVICE — FORCEPS BX L240CM JAW DIA2.8MM L CAP W/ NDL MIC MESH TOOTH

## (undated) DEVICE — DEVON™ KNEE AND BODY STRAP 60" X 3" (1.5 M X 7.6 CM): Brand: DEVON

## (undated) DEVICE — SYRINGE MED 25GA 3ML L5/8IN SUBQ PLAS W/ DETACH NDL SFTY

## (undated) DEVICE — Device

## (undated) DEVICE — KENDALL SCD EXPRESS SLEEVES, KNEE LENGTH, MEDIUM: Brand: KENDALL SCD

## (undated) DEVICE — SUTURE PERMAHAND SZ 3-0 L30IN NONABSORBABLE BLK W/O NDL SA84H

## (undated) DEVICE — PACK,EENT,STERILE,PK I: Brand: MEDLINE

## (undated) DEVICE — GOWN,AURORA,NONRNF,XL,30/CS: Brand: MEDLINE

## (undated) DEVICE — DRAIN SURG 10FR L1/8IN DIA3.2MM SIL CHN RND HUBLESS FULL

## (undated) DEVICE — INTENDED FOR TISSUE SEPARATION, AND OTHER PROCEDURES THAT REQUIRE A SHARP SURGICAL BLADE TO PUNCTURE OR CUT.: Brand: BARD-PARKER ® CARBON RIB-BACK BLADES

## (undated) DEVICE — SHEAR HARMONIC FOCUS OEM 9CM --

## (undated) DEVICE — INSTRUMENT PAD: Brand: DEROYAL

## (undated) DEVICE — 3-0 COATED VICRYL PLUS UNDYED 1X27" SH --

## (undated) DEVICE — SKIN MARKER,REGULAR TIP WITH RULER AND LABELS: Brand: DEVON

## (undated) DEVICE — GOWN ISOL IMPERV UNIV, DISP, OPEN BACK, BLUE --

## (undated) DEVICE — SYRINGE MED 20ML STD CLR PLAS LUERLOCK TIP N CTRL DISP

## (undated) DEVICE — SUT ETHLN 3-0 18IN PS2 BLK --

## (undated) DEVICE — (D)GLOVE EXAM LG NITRL NS -- DISC BY MFR NO SUB

## (undated) DEVICE — AIRLIFE™ NASAL OXYGEN CANNULA CURVED, NONFLARED TIP WITH 14 FOOT (4.3 M) CRUSH-RESISTANT TUBING, OVER-THE-EAR STYLE: Brand: AIRLIFE™

## (undated) DEVICE — APPLIER LIG CLP M L11IN TI STR RNG HNDL FOR 20 CLP DISP

## (undated) DEVICE — PIN SFTY L L2IN S STL FOR GRP HLD AND RET

## (undated) DEVICE — BSHR MAJOR BASIN PACK-LF: Brand: MEDLINE INDUSTRIES, INC.

## (undated) DEVICE — SUTURE PERMAHAND SZ 2-0 L30IN NONABSORBABLE BLK SILK W/O A305H

## (undated) DEVICE — MEDI-VAC SUCTION HIGH CAPACITY: Brand: CARDINAL HEALTH

## (undated) DEVICE — FLEX ADVANTAGE 3000CC: Brand: FLEX ADVANTAGE

## (undated) DEVICE — SYR 50ML SLIP TIP NSAF LF STRL --

## (undated) DEVICE — (D)SYR 10ML 1/5ML GRAD NSAF -- PKGING CHANGE USE ITEM 338027

## (undated) DEVICE — AVANOS* SHORT BEVEL NEEDLE: Brand: AVANOS

## (undated) DEVICE — CUFF BLD PRESSURE MONITORING LNG AD 23-33 CM 1 TUBE MY CUF

## (undated) DEVICE — REM POLYHESIVE ADULT PATIENT RETURN ELECTRODE: Brand: VALLEYLAB

## (undated) DEVICE — AMD ANTIMICROBIAL DRAIN SPONGES, 6 PLY, 0.2% POLYHEXAMETHYLENE BIGUANIDE HCI (PHMB): Brand: EXCILON

## (undated) DEVICE — SOLUTION IRRIG 1000ML H2O STRL BLT

## (undated) DEVICE — CATHETER SUCT TR FL TIP 14FR W/ O CTRL

## (undated) DEVICE — DRAPE TWL SURG 16X26IN BLU ORB04] ALLCARE INC]

## (undated) DEVICE — GAUZE SPONGES,16 PLY: Brand: CURITY

## (undated) DEVICE — SUT SLK 3-0 30IN SH BLK --

## (undated) DEVICE — TRAY SUPP STD NO DRUG W EXTENSION SET

## (undated) DEVICE — TRAY PREP DRY W/ PREM GLV 2 APPL 6 SPNG 2 UNDPD 1 OVERWRAP

## (undated) DEVICE — ENDOSCOPY PUMP TUBING/ CAP SET: Brand: ERBE

## (undated) DEVICE — SUTURE PROL SZ 5-0 L18IN NONABSORBABLE BLU L16MM PC-3 3/8 8635G

## (undated) DEVICE — SPONGE LAP 18X18IN STRL -- 5/PK

## (undated) DEVICE — STERILE POLYISOPRENE POWDER-FREE SURGICAL GLOVES: Brand: PROTEXIS

## (undated) DEVICE — SOL IRRIGATION INJ NACL 0.9% 500ML BTL

## (undated) DEVICE — CANNULA ORIG TL CLR W FOAM CUSHIONS AND 14FT SUPL TB 3 CHN

## (undated) DEVICE — MAGNETIC INSTRUMENT PAD 10" X 16"; MEDIUM; DISPOSABLE: Brand: CARDINAL HEALTH

## (undated) DEVICE — SYR 10ML CTRL LR LCK NSAF LF --

## (undated) DEVICE — SUT VCRL + 2-0 27IN SH UD --

## (undated) DEVICE — CORD BPLR L12FT DISP

## (undated) DEVICE — MEDI-VAC NON-CONDUCTIVE SUCTION TUBING: Brand: CARDINAL HEALTH

## (undated) DEVICE — 4-PORT MANIFOLD: Brand: NEPTUNE 2